# Patient Record
Sex: FEMALE | Race: WHITE | Employment: UNEMPLOYED | URBAN - METROPOLITAN AREA
[De-identification: names, ages, dates, MRNs, and addresses within clinical notes are randomized per-mention and may not be internally consistent; named-entity substitution may affect disease eponyms.]

---

## 2022-05-13 RX ORDER — IBUPROFEN 600 MG/1
600 TABLET, FILM COATED ORAL EVERY 6 HOURS PRN
Status: ON HOLD | COMMUNITY
End: 2022-05-27

## 2022-05-13 RX ORDER — LISINOPRIL AND HYDROCHLOROTHIAZIDE 12.5; 2 MG/1; MG/1
1 TABLET ORAL DAILY
Status: ON HOLD | COMMUNITY
End: 2022-06-09

## 2022-05-13 RX ORDER — ACETAMINOPHEN 500 MG
500-1000 TABLET ORAL EVERY 6 HOURS PRN
Status: ON HOLD | COMMUNITY
End: 2022-06-09

## 2022-05-13 RX ORDER — PANTOPRAZOLE SODIUM 40 MG/1
40 TABLET, DELAYED RELEASE ORAL DAILY
Status: ON HOLD | COMMUNITY
End: 2022-06-09

## 2022-05-13 RX ORDER — NAPROXEN SODIUM 220 MG
220 TABLET ORAL 2 TIMES DAILY WITH MEALS
Status: ON HOLD | COMMUNITY
End: 2022-06-09

## 2022-05-13 RX ORDER — METFORMIN HYDROCHLORIDE 750 MG/1
750 TABLET, EXTENDED RELEASE ORAL 2 TIMES DAILY WITH MEALS
Status: ON HOLD | COMMUNITY
End: 2022-06-09

## 2022-05-13 RX ORDER — GABAPENTIN 300 MG/1
300 CAPSULE ORAL 2 TIMES DAILY
Status: ON HOLD | COMMUNITY
End: 2022-06-09

## 2022-05-16 DIAGNOSIS — Z11.59 ENCOUNTER FOR SCREENING FOR OTHER VIRAL DISEASES: Primary | ICD-10-CM

## 2022-05-25 NOTE — PHARMACY-ADMISSION MEDICATION HISTORY
Medication history and patient interview completed by pharmacy intern/student or pre-admitting RN.  Reviewed by pharmacist, including [employer] dispense records, [facility] Everywhere, and chart review.       Joe Hammonds, Pharm.D., BCPS      Nurse Complete Set By: Vanita Mohr RN at 05/13/2022 10:13 AM       Prior to Admission medications    Medication Sig Last Dose Taking? Auth Provider   acetaminophen (TYLENOL) 500 MG tablet Take 500-1,000 mg by mouth every 6 hours as needed for mild pain  Yes Reported, Patient   gabapentin (NEURONTIN) 300 MG capsule Take 300 mg by mouth 2 times daily  Yes Reported, Patient   ibuprofen (ADVIL/MOTRIN) 600 MG tablet Take 600 mg by mouth every 6 hours as needed for moderate pain  Yes Reported, Patient   lisinopril-hydrochlorothiazide (ZESTORETIC) 20-12.5 MG tablet Take 1 tablet by mouth daily  Yes Reported, Patient   metFORMIN (GLUCOPHAGE-XR) 750 MG 24 hr tablet Take 750 mg by mouth 2 times daily (with meals)  Yes Reported, Patient   naproxen sodium (ANAPROX) 220 MG tablet Take 220 mg by mouth 2 times daily (with meals)  Yes Reported, Patient   pantoprazole (PROTONIX) 40 MG EC tablet Take 40 mg by mouth daily  Yes Reported, Patient

## 2022-05-27 ENCOUNTER — ANESTHESIA EVENT (OUTPATIENT)
Dept: SURGERY | Facility: CLINIC | Age: 37
DRG: 453 | End: 2022-05-27
Payer: COMMERCIAL

## 2022-05-27 ENCOUNTER — APPOINTMENT (OUTPATIENT)
Dept: GENERAL RADIOLOGY | Facility: CLINIC | Age: 37
DRG: 453 | End: 2022-05-27
Attending: NEUROLOGICAL SURGERY
Payer: COMMERCIAL

## 2022-05-27 ENCOUNTER — ANESTHESIA (OUTPATIENT)
Dept: SURGERY | Facility: CLINIC | Age: 37
DRG: 453 | End: 2022-05-27
Payer: COMMERCIAL

## 2022-05-27 ENCOUNTER — HOSPITAL ENCOUNTER (INPATIENT)
Facility: CLINIC | Age: 37
LOS: 5 days | Discharge: NURSING, REHAB OR LONG-TERM CARE | DRG: 453 | End: 2022-06-01
Attending: NEUROLOGICAL SURGERY | Admitting: NEUROLOGICAL SURGERY
Payer: COMMERCIAL

## 2022-05-27 DIAGNOSIS — N39.0 UTI DUE TO KLEBSIELLA SPECIES: ICD-10-CM

## 2022-05-27 DIAGNOSIS — B96.89 UTI DUE TO KLEBSIELLA SPECIES: ICD-10-CM

## 2022-05-27 DIAGNOSIS — M43.27 FUSION OF SPINE, LUMBOSACRAL REGION: Primary | ICD-10-CM

## 2022-05-27 LAB
CREAT SERPL-MCNC: 0.78 MG/DL (ref 0.52–1.04)
GFR SERPL CREATININE-BSD FRML MDRD: >90 ML/MIN/1.73M2
HGB BLD-MCNC: 11.3 G/DL (ref 11.7–15.7)
POTASSIUM BLD-SCNC: 4 MMOL/L (ref 3.4–5.3)

## 2022-05-27 PROCEDURE — 272N000282 HC OR IOM SUPPLIES OPNP: Performed by: NEUROLOGICAL SURGERY

## 2022-05-27 PROCEDURE — 250N000009 HC RX 250: Performed by: NURSE ANESTHETIST, CERTIFIED REGISTERED

## 2022-05-27 PROCEDURE — C1713 ANCHOR/SCREW BN/BN,TIS/BN: HCPCS | Performed by: NEUROLOGICAL SURGERY

## 2022-05-27 PROCEDURE — 258N000003 HC RX IP 258 OP 636: Performed by: NURSE ANESTHETIST, CERTIFIED REGISTERED

## 2022-05-27 PROCEDURE — 250N000013 HC RX MED GY IP 250 OP 250 PS 637: Performed by: PHYSICIAN ASSISTANT

## 2022-05-27 PROCEDURE — 370N000017 HC ANESTHESIA TECHNICAL FEE, PER MIN: Performed by: NEUROLOGICAL SURGERY

## 2022-05-27 PROCEDURE — 3E0R33Z INTRODUCTION OF ANTI-INFLAMMATORY INTO SPINAL CANAL, PERCUTANEOUS APPROACH: ICD-10-PCS | Performed by: NEUROLOGICAL SURGERY

## 2022-05-27 PROCEDURE — 07DS3ZZ EXTRACTION OF VERTEBRAL BONE MARROW, PERCUTANEOUS APPROACH: ICD-10-PCS | Performed by: NEUROLOGICAL SURGERY

## 2022-05-27 PROCEDURE — 272N000002 HC OR SUPPLY OTHER OPNP: Performed by: NEUROLOGICAL SURGERY

## 2022-05-27 PROCEDURE — 258N000003 HC RX IP 258 OP 636: Performed by: PHYSICIAN ASSISTANT

## 2022-05-27 PROCEDURE — 120N000001 HC R&B MED SURG/OB

## 2022-05-27 PROCEDURE — 0SG0371 FUSION OF LUMBAR VERTEBRAL JOINT WITH AUTOLOGOUS TISSUE SUBSTITUTE, POSTERIOR APPROACH, POSTERIOR COLUMN, PERCUTANEOUS APPROACH: ICD-10-PCS | Performed by: NEUROLOGICAL SURGERY

## 2022-05-27 PROCEDURE — 250N000011 HC RX IP 250 OP 636: Performed by: NURSE ANESTHETIST, CERTIFIED REGISTERED

## 2022-05-27 PROCEDURE — 250N000011 HC RX IP 250 OP 636: Performed by: NEUROLOGICAL SURGERY

## 2022-05-27 PROCEDURE — 0SG3371 FUSION OF LUMBOSACRAL JOINT WITH AUTOLOGOUS TISSUE SUBSTITUTE, POSTERIOR APPROACH, POSTERIOR COLUMN, PERCUTANEOUS APPROACH: ICD-10-PCS | Performed by: NEUROLOGICAL SURGERY

## 2022-05-27 PROCEDURE — 250N000009 HC RX 250: Performed by: NEUROLOGICAL SURGERY

## 2022-05-27 PROCEDURE — 360N000084 HC SURGERY LEVEL 4 W/ FLUORO, PER MIN: Performed by: NEUROLOGICAL SURGERY

## 2022-05-27 PROCEDURE — 4A11X4G MONITORING OF PERIPHERAL NERVOUS ELECTRICAL ACTIVITY, INTRAOPERATIVE, EXTERNAL APPROACH: ICD-10-PCS | Performed by: NEUROLOGICAL SURGERY

## 2022-05-27 PROCEDURE — 250N000013 HC RX MED GY IP 250 OP 250 PS 637: Performed by: NEUROLOGICAL SURGERY

## 2022-05-27 PROCEDURE — 0SG03A0 FUSION OF LUMBAR VERTEBRAL JOINT WITH INTERBODY FUSION DEVICE, ANTERIOR APPROACH, ANTERIOR COLUMN, PERCUTANEOUS APPROACH: ICD-10-PCS | Performed by: NEUROLOGICAL SURGERY

## 2022-05-27 PROCEDURE — 250N000011 HC RX IP 250 OP 636: Performed by: PHYSICIAN ASSISTANT

## 2022-05-27 PROCEDURE — 99223 1ST HOSP IP/OBS HIGH 75: CPT | Performed by: HOSPITALIST

## 2022-05-27 PROCEDURE — 01NR3ZZ RELEASE SACRAL NERVE, PERCUTANEOUS APPROACH: ICD-10-PCS | Performed by: NEUROLOGICAL SURGERY

## 2022-05-27 PROCEDURE — 82565 ASSAY OF CREATININE: CPT | Performed by: ANESTHESIOLOGY

## 2022-05-27 PROCEDURE — 3E013BZ INTRODUCTION OF ANESTHETIC AGENT INTO SUBCUTANEOUS TISSUE, PERCUTANEOUS APPROACH: ICD-10-PCS | Performed by: NEUROLOGICAL SURGERY

## 2022-05-27 PROCEDURE — 99222 1ST HOSP IP/OBS MODERATE 55: CPT | Performed by: NURSE PRACTITIONER

## 2022-05-27 PROCEDURE — 0SG33A0 FUSION OF LUMBOSACRAL JOINT WITH INTERBODY FUSION DEVICE, ANTERIOR APPROACH, ANTERIOR COLUMN, PERCUTANEOUS APPROACH: ICD-10-PCS | Performed by: NEUROLOGICAL SURGERY

## 2022-05-27 PROCEDURE — 0SH034Z INSERTION OF INTERNAL FIXATION DEVICE INTO LUMBAR VERTEBRAL JOINT, PERCUTANEOUS APPROACH: ICD-10-PCS | Performed by: NEUROLOGICAL SURGERY

## 2022-05-27 PROCEDURE — 0SH334Z INSERTION OF INTERNAL FIXATION DEVICE INTO LUMBOSACRAL JOINT, PERCUTANEOUS APPROACH: ICD-10-PCS | Performed by: NEUROLOGICAL SURGERY

## 2022-05-27 PROCEDURE — 85018 HEMOGLOBIN: CPT | Performed by: ANESTHESIOLOGY

## 2022-05-27 PROCEDURE — 710N000009 HC RECOVERY PHASE 1, LEVEL 1, PER MIN: Performed by: NEUROLOGICAL SURGERY

## 2022-05-27 PROCEDURE — 272N000001 HC OR GENERAL SUPPLY STERILE: Performed by: NEUROLOGICAL SURGERY

## 2022-05-27 PROCEDURE — 250N000011 HC RX IP 250 OP 636: Performed by: ANESTHESIOLOGY

## 2022-05-27 PROCEDURE — 01NB3ZZ RELEASE LUMBAR NERVE, PERCUTANEOUS APPROACH: ICD-10-PCS | Performed by: NEUROLOGICAL SURGERY

## 2022-05-27 PROCEDURE — 999N000179 XR SURGERY CARM FLUORO LESS THAN 5 MIN W STILLS: Mod: TC

## 2022-05-27 PROCEDURE — 250N000013 HC RX MED GY IP 250 OP 250 PS 637: Performed by: NURSE PRACTITIONER

## 2022-05-27 PROCEDURE — 36415 COLL VENOUS BLD VENIPUNCTURE: CPT | Performed by: ANESTHESIOLOGY

## 2022-05-27 PROCEDURE — 999N000141 HC STATISTIC PRE-PROCEDURE NURSING ASSESSMENT: Performed by: NEUROLOGICAL SURGERY

## 2022-05-27 PROCEDURE — 84132 ASSAY OF SERUM POTASSIUM: CPT | Performed by: ANESTHESIOLOGY

## 2022-05-27 RX ORDER — PROCHLORPERAZINE MALEATE 10 MG
10 TABLET ORAL EVERY 6 HOURS PRN
Status: DISCONTINUED | OUTPATIENT
Start: 2022-05-27 | End: 2022-06-01 | Stop reason: HOSPADM

## 2022-05-27 RX ORDER — DEXAMETHASONE SODIUM PHOSPHATE 4 MG/ML
INJECTION, SOLUTION INTRA-ARTICULAR; INTRALESIONAL; INTRAMUSCULAR; INTRAVENOUS; SOFT TISSUE PRN
Status: DISCONTINUED | OUTPATIENT
Start: 2022-05-27 | End: 2022-05-27

## 2022-05-27 RX ORDER — OXYCODONE HYDROCHLORIDE 5 MG/1
5 TABLET ORAL EVERY 4 HOURS PRN
Status: DISCONTINUED | OUTPATIENT
Start: 2022-05-27 | End: 2022-05-27 | Stop reason: HOSPADM

## 2022-05-27 RX ORDER — HYDROXYZINE HYDROCHLORIDE 25 MG/1
25 TABLET, FILM COATED ORAL EVERY 6 HOURS PRN
Status: DISCONTINUED | OUTPATIENT
Start: 2022-05-27 | End: 2022-05-28

## 2022-05-27 RX ORDER — LABETALOL 20 MG/4 ML (5 MG/ML) INTRAVENOUS SYRINGE
10 EVERY 10 MIN PRN
Status: DISCONTINUED | OUTPATIENT
Start: 2022-05-27 | End: 2022-05-27 | Stop reason: HOSPADM

## 2022-05-27 RX ORDER — ONDANSETRON 4 MG/1
4 TABLET, ORALLY DISINTEGRATING ORAL EVERY 6 HOURS PRN
Status: DISCONTINUED | OUTPATIENT
Start: 2022-05-27 | End: 2022-06-01 | Stop reason: HOSPADM

## 2022-05-27 RX ORDER — METHOCARBAMOL 500 MG/1
1000 TABLET, FILM COATED ORAL EVERY 6 HOURS
Status: DISCONTINUED | OUTPATIENT
Start: 2022-05-27 | End: 2022-06-01 | Stop reason: HOSPADM

## 2022-05-27 RX ORDER — OXYCODONE HYDROCHLORIDE 5 MG/1
5 TABLET ORAL EVERY 4 HOURS PRN
Status: DISCONTINUED | OUTPATIENT
Start: 2022-05-27 | End: 2022-05-27

## 2022-05-27 RX ORDER — NALOXONE HYDROCHLORIDE 0.4 MG/ML
0.4 INJECTION, SOLUTION INTRAMUSCULAR; INTRAVENOUS; SUBCUTANEOUS
Status: DISCONTINUED | OUTPATIENT
Start: 2022-05-27 | End: 2022-06-01 | Stop reason: HOSPADM

## 2022-05-27 RX ORDER — KETOROLAC TROMETHAMINE 30 MG/ML
15 INJECTION, SOLUTION INTRAMUSCULAR; INTRAVENOUS
Status: DISCONTINUED | OUTPATIENT
Start: 2022-05-27 | End: 2022-05-27 | Stop reason: HOSPADM

## 2022-05-27 RX ORDER — NALOXONE HYDROCHLORIDE 0.4 MG/ML
0.2 INJECTION, SOLUTION INTRAMUSCULAR; INTRAVENOUS; SUBCUTANEOUS
Status: DISCONTINUED | OUTPATIENT
Start: 2022-05-27 | End: 2022-06-01 | Stop reason: HOSPADM

## 2022-05-27 RX ORDER — METHOCARBAMOL 750 MG/1
750 TABLET, FILM COATED ORAL EVERY 6 HOURS
Status: DISCONTINUED | OUTPATIENT
Start: 2022-05-27 | End: 2022-05-27

## 2022-05-27 RX ORDER — PANTOPRAZOLE SODIUM 40 MG/1
40 TABLET, DELAYED RELEASE ORAL DAILY
Status: DISCONTINUED | OUTPATIENT
Start: 2022-05-27 | End: 2022-06-01 | Stop reason: HOSPADM

## 2022-05-27 RX ORDER — HYDROXYZINE HYDROCHLORIDE 25 MG/1
25 TABLET, FILM COATED ORAL 3 TIMES DAILY
Qty: 30 TABLET | Refills: 0 | Status: ON HOLD | OUTPATIENT
Start: 2022-05-27 | End: 2022-06-09

## 2022-05-27 RX ORDER — METFORMIN HYDROCHLORIDE 750 MG/1
750 TABLET, EXTENDED RELEASE ORAL 2 TIMES DAILY WITH MEALS
Status: DISCONTINUED | OUTPATIENT
Start: 2022-05-27 | End: 2022-06-01 | Stop reason: HOSPADM

## 2022-05-27 RX ORDER — SODIUM CHLORIDE, SODIUM LACTATE, POTASSIUM CHLORIDE, CALCIUM CHLORIDE 600; 310; 30; 20 MG/100ML; MG/100ML; MG/100ML; MG/100ML
INJECTION, SOLUTION INTRAVENOUS CONTINUOUS
Status: DISCONTINUED | OUTPATIENT
Start: 2022-05-27 | End: 2022-05-27 | Stop reason: HOSPADM

## 2022-05-27 RX ORDER — GLYCOPYRROLATE 0.2 MG/ML
INJECTION, SOLUTION INTRAMUSCULAR; INTRAVENOUS PRN
Status: DISCONTINUED | OUTPATIENT
Start: 2022-05-27 | End: 2022-05-27

## 2022-05-27 RX ORDER — FENTANYL CITRATE 50 UG/ML
25 INJECTION, SOLUTION INTRAMUSCULAR; INTRAVENOUS EVERY 5 MIN PRN
Status: DISCONTINUED | OUTPATIENT
Start: 2022-05-27 | End: 2022-05-27 | Stop reason: HOSPADM

## 2022-05-27 RX ORDER — AMOXICILLIN 250 MG
1 CAPSULE ORAL 2 TIMES DAILY
Status: DISCONTINUED | OUTPATIENT
Start: 2022-05-27 | End: 2022-06-01 | Stop reason: HOSPADM

## 2022-05-27 RX ORDER — MEPERIDINE HYDROCHLORIDE 25 MG/ML
25 INJECTION INTRAMUSCULAR; INTRAVENOUS; SUBCUTANEOUS
Status: DISCONTINUED | OUTPATIENT
Start: 2022-05-27 | End: 2022-05-27 | Stop reason: HOSPADM

## 2022-05-27 RX ORDER — METHOCARBAMOL 750 MG/1
750 TABLET, FILM COATED ORAL EVERY 6 HOURS PRN
Status: DISCONTINUED | OUTPATIENT
Start: 2022-05-27 | End: 2022-05-27

## 2022-05-27 RX ORDER — OXYCODONE HYDROCHLORIDE 10 MG/1
10 TABLET ORAL EVERY 4 HOURS PRN
Qty: 42 TABLET | Refills: 0 | Status: ON HOLD | OUTPATIENT
Start: 2022-05-27 | End: 2022-06-09

## 2022-05-27 RX ORDER — HYDROMORPHONE HCL IN WATER/PF 6 MG/30 ML
0.4 PATIENT CONTROLLED ANALGESIA SYRINGE INTRAVENOUS
Status: DISCONTINUED | OUTPATIENT
Start: 2022-05-27 | End: 2022-05-29

## 2022-05-27 RX ORDER — LISINOPRIL AND HYDROCHLOROTHIAZIDE 12.5; 2 MG/1; MG/1
1 TABLET ORAL DAILY
Status: DISCONTINUED | OUTPATIENT
Start: 2022-05-27 | End: 2022-06-01 | Stop reason: HOSPADM

## 2022-05-27 RX ORDER — ACETAMINOPHEN 325 MG/1
975 TABLET ORAL EVERY 8 HOURS
Status: COMPLETED | OUTPATIENT
Start: 2022-05-27 | End: 2022-05-30

## 2022-05-27 RX ORDER — KETAMINE HYDROCHLORIDE 10 MG/ML
INJECTION INTRAMUSCULAR; INTRAVENOUS PRN
Status: DISCONTINUED | OUTPATIENT
Start: 2022-05-27 | End: 2022-05-27

## 2022-05-27 RX ORDER — SODIUM CHLORIDE 9 MG/ML
INJECTION, SOLUTION INTRAVENOUS CONTINUOUS
Status: DISCONTINUED | OUTPATIENT
Start: 2022-05-27 | End: 2022-05-30

## 2022-05-27 RX ORDER — ONDANSETRON 2 MG/ML
INJECTION INTRAMUSCULAR; INTRAVENOUS PRN
Status: DISCONTINUED | OUTPATIENT
Start: 2022-05-27 | End: 2022-05-27

## 2022-05-27 RX ORDER — OXYCODONE HYDROCHLORIDE 5 MG/1
10 TABLET ORAL
Status: DISCONTINUED | OUTPATIENT
Start: 2022-05-27 | End: 2022-05-29

## 2022-05-27 RX ORDER — BISACODYL 10 MG
10 SUPPOSITORY, RECTAL RECTAL DAILY PRN
Status: DISCONTINUED | OUTPATIENT
Start: 2022-05-27 | End: 2022-06-01 | Stop reason: HOSPADM

## 2022-05-27 RX ORDER — FENTANYL CITRATE 50 UG/ML
INJECTION, SOLUTION INTRAMUSCULAR; INTRAVENOUS PRN
Status: DISCONTINUED | OUTPATIENT
Start: 2022-05-27 | End: 2022-05-27

## 2022-05-27 RX ORDER — BUPIVACAINE HYDROCHLORIDE 7.5 MG/ML
INJECTION, SOLUTION EPIDURAL; RETROBULBAR PRN
Status: DISCONTINUED | OUTPATIENT
Start: 2022-05-27 | End: 2022-05-27 | Stop reason: HOSPADM

## 2022-05-27 RX ORDER — SODIUM CHLORIDE, SODIUM LACTATE, POTASSIUM CHLORIDE, CALCIUM CHLORIDE 600; 310; 30; 20 MG/100ML; MG/100ML; MG/100ML; MG/100ML
INJECTION, SOLUTION INTRAVENOUS CONTINUOUS PRN
Status: DISCONTINUED | OUTPATIENT
Start: 2022-05-27 | End: 2022-05-27

## 2022-05-27 RX ORDER — CEFAZOLIN SODIUM/WATER 3 G/30 ML
3 SYRINGE (ML) INTRAVENOUS
Status: COMPLETED | OUTPATIENT
Start: 2022-05-27 | End: 2022-05-27

## 2022-05-27 RX ORDER — ONDANSETRON 4 MG/1
4 TABLET, ORALLY DISINTEGRATING ORAL EVERY 30 MIN PRN
Status: DISCONTINUED | OUTPATIENT
Start: 2022-05-27 | End: 2022-05-27 | Stop reason: HOSPADM

## 2022-05-27 RX ORDER — GABAPENTIN 100 MG/1
100 CAPSULE ORAL 3 TIMES DAILY
Status: DISCONTINUED | OUTPATIENT
Start: 2022-05-27 | End: 2022-05-27

## 2022-05-27 RX ORDER — OXYCODONE HYDROCHLORIDE 5 MG/1
5 TABLET ORAL
Status: DISCONTINUED | OUTPATIENT
Start: 2022-05-27 | End: 2022-05-29

## 2022-05-27 RX ORDER — LIDOCAINE 40 MG/G
CREAM TOPICAL
Status: DISCONTINUED | OUTPATIENT
Start: 2022-05-27 | End: 2022-06-01 | Stop reason: HOSPADM

## 2022-05-27 RX ORDER — LIDOCAINE HYDROCHLORIDE 10 MG/ML
INJECTION, SOLUTION INFILTRATION; PERINEURAL PRN
Status: DISCONTINUED | OUTPATIENT
Start: 2022-05-27 | End: 2022-05-27

## 2022-05-27 RX ORDER — FENTANYL CITRATE 50 UG/ML
25 INJECTION, SOLUTION INTRAMUSCULAR; INTRAVENOUS
Status: DISCONTINUED | OUTPATIENT
Start: 2022-05-27 | End: 2022-05-27 | Stop reason: HOSPADM

## 2022-05-27 RX ORDER — ACETAMINOPHEN 325 MG/1
650 TABLET ORAL EVERY 4 HOURS PRN
Status: DISCONTINUED | OUTPATIENT
Start: 2022-05-30 | End: 2022-06-01 | Stop reason: HOSPADM

## 2022-05-27 RX ORDER — METHOCARBAMOL 750 MG/1
750 TABLET, FILM COATED ORAL EVERY 8 HOURS
Qty: 30 TABLET | Refills: 0 | Status: ON HOLD | OUTPATIENT
Start: 2022-05-27 | End: 2022-06-09

## 2022-05-27 RX ORDER — OXYCODONE HYDROCHLORIDE 5 MG/1
10 TABLET ORAL EVERY 4 HOURS PRN
Status: DISCONTINUED | OUTPATIENT
Start: 2022-05-27 | End: 2022-05-27

## 2022-05-27 RX ORDER — ONDANSETRON 2 MG/ML
4 INJECTION INTRAMUSCULAR; INTRAVENOUS EVERY 6 HOURS PRN
Status: DISCONTINUED | OUTPATIENT
Start: 2022-05-27 | End: 2022-06-01 | Stop reason: HOSPADM

## 2022-05-27 RX ORDER — PROPOFOL 10 MG/ML
INJECTION, EMULSION INTRAVENOUS CONTINUOUS PRN
Status: DISCONTINUED | OUTPATIENT
Start: 2022-05-27 | End: 2022-05-27

## 2022-05-27 RX ORDER — ONDANSETRON 2 MG/ML
4 INJECTION INTRAMUSCULAR; INTRAVENOUS EVERY 30 MIN PRN
Status: DISCONTINUED | OUTPATIENT
Start: 2022-05-27 | End: 2022-05-27 | Stop reason: HOSPADM

## 2022-05-27 RX ORDER — GABAPENTIN 300 MG/1
300 CAPSULE ORAL 3 TIMES DAILY
Status: DISCONTINUED | OUTPATIENT
Start: 2022-05-27 | End: 2022-05-28

## 2022-05-27 RX ORDER — GABAPENTIN 100 MG/1
300 CAPSULE ORAL
Status: COMPLETED | OUTPATIENT
Start: 2022-05-27 | End: 2022-05-27

## 2022-05-27 RX ORDER — PROPOFOL 10 MG/ML
INJECTION, EMULSION INTRAVENOUS PRN
Status: DISCONTINUED | OUTPATIENT
Start: 2022-05-27 | End: 2022-05-27

## 2022-05-27 RX ORDER — CEFAZOLIN SODIUM/WATER 3 G/30 ML
3 SYRINGE (ML) INTRAVENOUS SEE ADMIN INSTRUCTIONS
Status: DISCONTINUED | OUTPATIENT
Start: 2022-05-27 | End: 2022-05-27 | Stop reason: HOSPADM

## 2022-05-27 RX ORDER — POLYETHYLENE GLYCOL 3350 17 G/17G
17 POWDER, FOR SOLUTION ORAL DAILY
Status: DISCONTINUED | OUTPATIENT
Start: 2022-05-28 | End: 2022-06-01 | Stop reason: HOSPADM

## 2022-05-27 RX ORDER — KETOROLAC TROMETHAMINE 30 MG/ML
15 INJECTION, SOLUTION INTRAMUSCULAR; INTRAVENOUS EVERY 6 HOURS
Status: COMPLETED | OUTPATIENT
Start: 2022-05-27 | End: 2022-05-28

## 2022-05-27 RX ORDER — LIDOCAINE 40 MG/G
CREAM TOPICAL
Status: DISCONTINUED | OUTPATIENT
Start: 2022-05-27 | End: 2022-05-27 | Stop reason: HOSPADM

## 2022-05-27 RX ORDER — HYDROMORPHONE HCL IN WATER/PF 6 MG/30 ML
0.2 PATIENT CONTROLLED ANALGESIA SYRINGE INTRAVENOUS
Status: DISCONTINUED | OUTPATIENT
Start: 2022-05-27 | End: 2022-05-29

## 2022-05-27 RX ADMIN — HYDROMORPHONE HYDROCHLORIDE 1 MG: 1 INJECTION, SOLUTION INTRAMUSCULAR; INTRAVENOUS; SUBCUTANEOUS at 08:13

## 2022-05-27 RX ADMIN — SENNOSIDES AND DOCUSATE SODIUM 1 TABLET: 50; 8.6 TABLET ORAL at 20:24

## 2022-05-27 RX ADMIN — Medication 100 MG: at 07:37

## 2022-05-27 RX ADMIN — HYDROMORPHONE HYDROCHLORIDE 0.2 MG: 0.2 INJECTION, SOLUTION INTRAMUSCULAR; INTRAVENOUS; SUBCUTANEOUS at 12:26

## 2022-05-27 RX ADMIN — HYDROMORPHONE HYDROCHLORIDE 0.4 MG: 1 INJECTION, SOLUTION INTRAMUSCULAR; INTRAVENOUS; SUBCUTANEOUS at 10:24

## 2022-05-27 RX ADMIN — ONDANSETRON HYDROCHLORIDE 4 MG: 2 INJECTION, SOLUTION INTRAVENOUS at 08:49

## 2022-05-27 RX ADMIN — OXYCODONE HYDROCHLORIDE 10 MG: 5 TABLET ORAL at 11:06

## 2022-05-27 RX ADMIN — HYDROMORPHONE HYDROCHLORIDE 0.2 MG: 0.2 INJECTION, SOLUTION INTRAMUSCULAR; INTRAVENOUS; SUBCUTANEOUS at 15:50

## 2022-05-27 RX ADMIN — MIDAZOLAM 2 MG: 1 INJECTION INTRAMUSCULAR; INTRAVENOUS at 07:31

## 2022-05-27 RX ADMIN — FENTANYL CITRATE 25 MCG: 50 INJECTION INTRAMUSCULAR; INTRAVENOUS at 10:17

## 2022-05-27 RX ADMIN — PHENYLEPHRINE HYDROCHLORIDE 150 MCG: 10 INJECTION INTRAVENOUS at 08:52

## 2022-05-27 RX ADMIN — SODIUM CHLORIDE, POTASSIUM CHLORIDE, SODIUM LACTATE AND CALCIUM CHLORIDE: 600; 310; 30; 20 INJECTION, SOLUTION INTRAVENOUS at 06:57

## 2022-05-27 RX ADMIN — KETOROLAC TROMETHAMINE 15 MG: 30 INJECTION, SOLUTION INTRAMUSCULAR at 20:25

## 2022-05-27 RX ADMIN — FENTANYL CITRATE 150 MCG: 50 INJECTION, SOLUTION INTRAMUSCULAR; INTRAVENOUS at 07:36

## 2022-05-27 RX ADMIN — HYDROMORPHONE HYDROCHLORIDE 0.4 MG: 0.2 INJECTION, SOLUTION INTRAMUSCULAR; INTRAVENOUS; SUBCUTANEOUS at 22:06

## 2022-05-27 RX ADMIN — HYDROMORPHONE HYDROCHLORIDE 0.4 MG: 1 INJECTION, SOLUTION INTRAMUSCULAR; INTRAVENOUS; SUBCUTANEOUS at 10:46

## 2022-05-27 RX ADMIN — PANTOPRAZOLE SODIUM 40 MG: 40 TABLET, DELAYED RELEASE ORAL at 13:45

## 2022-05-27 RX ADMIN — GABAPENTIN 300 MG: 100 CAPSULE ORAL at 13:46

## 2022-05-27 RX ADMIN — METHOCARBAMOL 750 MG: 750 TABLET ORAL at 13:59

## 2022-05-27 RX ADMIN — PROPOFOL 175 MCG/KG/MIN: 10 INJECTION, EMULSION INTRAVENOUS at 07:59

## 2022-05-27 RX ADMIN — HYDROXYZINE HYDROCHLORIDE 25 MG: 25 TABLET, FILM COATED ORAL at 17:09

## 2022-05-27 RX ADMIN — LISINOPRIL AND HYDROCHLOROTHIAZIDE 1 TABLET: 12.5; 2 TABLET ORAL at 13:45

## 2022-05-27 RX ADMIN — OXYCODONE HYDROCHLORIDE 10 MG: 5 TABLET ORAL at 16:20

## 2022-05-27 RX ADMIN — OXYCODONE HYDROCHLORIDE 10 MG: 5 TABLET ORAL at 19:23

## 2022-05-27 RX ADMIN — HYDROMORPHONE HYDROCHLORIDE 0.4 MG: 0.2 INJECTION, SOLUTION INTRAMUSCULAR; INTRAVENOUS; SUBCUTANEOUS at 17:58

## 2022-05-27 RX ADMIN — GABAPENTIN 300 MG: 100 CAPSULE ORAL at 20:24

## 2022-05-27 RX ADMIN — METHOCARBAMOL 1000 MG: 500 TABLET ORAL at 20:24

## 2022-05-27 RX ADMIN — ROCURONIUM BROMIDE 10 MG: 50 INJECTION, SOLUTION INTRAVENOUS at 07:36

## 2022-05-27 RX ADMIN — KETOROLAC TROMETHAMINE 15 MG: 30 INJECTION, SOLUTION INTRAMUSCULAR at 13:46

## 2022-05-27 RX ADMIN — PROPOFOL 150 MG: 10 INJECTION, EMULSION INTRAVENOUS at 07:45

## 2022-05-27 RX ADMIN — ACETAMINOPHEN 975 MG: 325 TABLET, FILM COATED ORAL at 22:05

## 2022-05-27 RX ADMIN — ACETAMINOPHEN 975 MG: 325 TABLET, FILM COATED ORAL at 13:44

## 2022-05-27 RX ADMIN — DEXAMETHASONE SODIUM PHOSPHATE 8 MG: 4 INJECTION, SOLUTION INTRA-ARTICULAR; INTRALESIONAL; INTRAMUSCULAR; INTRAVENOUS; SOFT TISSUE at 07:37

## 2022-05-27 RX ADMIN — FENTANYL CITRATE 100 MCG: 50 INJECTION, SOLUTION INTRAMUSCULAR; INTRAVENOUS at 07:44

## 2022-05-27 RX ADMIN — SODIUM CHLORIDE: 9 INJECTION, SOLUTION INTRAVENOUS at 12:31

## 2022-05-27 RX ADMIN — LIDOCAINE HYDROCHLORIDE 50 MG: 10 INJECTION, SOLUTION INFILTRATION; PERINEURAL at 07:36

## 2022-05-27 RX ADMIN — Medication 50 MG: at 08:00

## 2022-05-27 RX ADMIN — CEFAZOLIN SODIUM 3 G: 10 INJECTION, POWDER, FOR SOLUTION INTRAVENOUS at 07:35

## 2022-05-27 RX ADMIN — HYDROXYZINE HYDROCHLORIDE 25 MG: 25 TABLET, FILM COATED ORAL at 10:58

## 2022-05-27 RX ADMIN — SODIUM CHLORIDE, POTASSIUM CHLORIDE, SODIUM LACTATE AND CALCIUM CHLORIDE: 600; 310; 30; 20 INJECTION, SOLUTION INTRAVENOUS at 08:28

## 2022-05-27 RX ADMIN — FENTANYL CITRATE 25 MCG: 50 INJECTION INTRAMUSCULAR; INTRAVENOUS at 10:10

## 2022-05-27 RX ADMIN — GABAPENTIN 300 MG: 100 CAPSULE ORAL at 06:13

## 2022-05-27 RX ADMIN — GLYCOPYRROLATE 0.2 MG: 0.2 INJECTION, SOLUTION INTRAMUSCULAR; INTRAVENOUS at 07:36

## 2022-05-27 RX ADMIN — PROPOFOL 200 MG: 10 INJECTION, EMULSION INTRAVENOUS at 07:36

## 2022-05-27 ASSESSMENT — ACTIVITIES OF DAILY LIVING (ADL)
ADLS_ACUITY_SCORE: 20

## 2022-05-27 NOTE — CONSULTS
[facility]  Pain Service Consultation   Text Page    Date of Admission:  5/27/2022  ADDENDUM:  Returned to the bedside for uncontrolled pain.  Modified oxycodone dosing to every 3 hours, increased robaxin dose to 1000 mg.  Assessment & Plan   Tere Turk is a 36 year old female who was admitted on 5/27/2022. I was asked by Bea Mayfield to see the patient for post operative pain management.    PLAN:   1)  Opioids:  - Oxycodone 5-10 mg every 4 hours prn moderate to severe pain  - hydromorphone 0.2 - 0.4 mg every 2 hours severe, breakthrough pain    Opioids Treatment Goal:   -Improvement in function  -Participate in PT  -Post-operative management of pain, expected 3-7 days needed    2)Non-opioid multimodal medication therapy  -Topical:Voltaren 1% Topical Gel four times daily, Lidocaine Patch 4% place daily in evening.  -N-SAIDS:Ketorolac - per surgical team  -Muscle Relaxants:Methocarbamol 750 mg every 6 hours scheduled  -Adjuvants:Acetaminophen 975 mg every 8 hours, Gabapentin 300 mg three times daily, Hydroxyzine 25-50 mg every 8 hours prn  -Antidepresants/anxiolytics:Hydroxyzine 25-50 mg every 8 hours prn    3)  Non-medication interventions  Positioning, ICE, Relaxation, Physical therapy    4)  Constipation Prophylaxis  - senna-docusate 1 tablet 2 times daily  - miralax 1 packet daily    5) Medication Risk reduction strategies   -monitor for sedation   -Capnography per protocol   -narcan for opioid reversal     6)  Pain Education  -Opioid safe use, storage and disposal information included in DC AVS    7)  DC Planning   Discussed goal of Opioid therapy as above with patient and nursing team  Length of therapy is less than 10 days, opioids may be stopped without taper.  Continued outpatient management of pain per surgical team  Disposition: home  Support systems: mother  The following risk factors have been identified for unintentional overdose: patient is overweight and patient has  "sleep disordered breathing. Discharge with intra-nasal naloxone if discharged to home with opioids  >40 mg MME/day.  Plan for education prior to discharge.    ASSESSMENT  1)  Acute low back pain s/p L4 - S1 lumbar decompression and fusion    2)  Patient with chronic back and joint pain, NOT on chronic opioid therapy  Baseline 0 mg Daily Morphine Equivalent as dispensed and as reported daily use.  Patient has no expected opioid tolerance.     Patient's opioid use in perioperative settin.0 mg dilaudid IV, 300 mcg fentanyl IV, 10 mg oxycodone PO= 75 mg Daily Morphine Equivalent    3)  Risk factors for opioid related harms  -Sleep disordered breathing    4)  Opioid induced side-effects:  -Constipation  - history of constipation, monitor for need to escalate regimen  -Nausea/Vomit - no history  -Sedation - history of \"dizzy and sleepy\" feelings with opiates.   -Urinary Retention - no history    Time Spent on this Encounter   Total unit/floor time 45 minutes, time consisted of the following, examination of the patient, reviewing the record and completing documentation. >50% of time spent in counseling and coordination of care.  Time spend counseling with patient and family consisted of the following topics, symptom management.  Time spent in coordination of care with Bedside Nurse Roselia.     TATUM Crenshaw CNP  Pain Management and Palliative Care  [facility]  Pgr: [phone]      Reason for Consult   Reason for consult: I was asked by Bea Mayfield to evaluate this patient for post operative pain management.    Primary Care Physician   Primary Care Physician:Provider Not In System - patient resides and has PCP in [place]    Chief Complaint   S/p L4 - S1 fusion    History is obtained from the patient and electronic health record    History of Present Illness   Tere Turk is a 36 year old female with history of low back and joint pain, PCOS, GERD, DEREK, obesity who presents for " "elective lumbar fusion surgery with Dr. Mcgraw.      CURRENT PAIN:  Her pain is located in the low back, hips  It is described as Aching, burning, \"gripping\"  She rates it as ranging between 9/10 and 10/10  The average is 9/10 on a scale of 0-10  Currently it is rated as 9/10  It improves by positioning with knees elevated, medications  It worsens by movement, knees flat on the bed  She has been compliant with the recommendations while in the hospital.      PAIN HISTORY:  The pain is mainly located in the low back  It is described as Aching and Shooting  Rates it as ranging between 4/10 and 10/10  It improves by medications, sitting  It worsens by lying flat, standing/walking for prolonged periods of time      PAST PAIN TREATMENT:   Medications: gabapentin 300 mg two times daily, ibuprofen/aleve (primary agents used, helps with day to day pain), robaxin (one short course, helped), oxycodone (remote history, helped),   Non-phamacologic modalities: physical therapy  Previous interventions/surgeries: CARMEN.    [place] Board of Pharmacy Data Base Reviewed:    YES; As expected, no concern for misuse/abuse of controlled medications based on this report.  OPIOID RISK DOHYZ=528    Past Medical History   I have reviewed this patient's medical history and updated it with pertinent information if needed.   Past Medical History:   Diagnosis Date     Gastroesophageal reflux disease      Hypertension      PCOS (polycystic ovarian syndrome)      Sleep apnea     bipap     WPW (Portia-Parkinson-White syndrome)     ablation        Past Surgical History   I have reviewed this patient's surgical history and updated it with pertinent information if needed.  Past Surgical History:   Procedure Laterality Date     CARDIAC SURGERY      ablation - WPW      SECTION  2017     COLONOSCOPY       EPIDURAL BLOCK INJECTION      injections with sedation x2 lower back     GYN SURGERY      polypectomy x2 2016 and 2015     HC " HYSTEROSALPINGOGRAM      x2     ORTHOPEDIC SURGERY Right     knee arthroscopy     TUMOR EXCISION      from abdomen area, desmoid tumor, required surgery x3     WOUND DEBRIDEMENT      wound vac after  section incision (hematoma)         Prior to Admission Medications   Prior to Admission Medications   Prescriptions Last Dose Informant Patient Reported? Taking?   acetaminophen (TYLENOL) 500 MG tablet 2022 at 1600  Yes Yes   Sig: Take 500-1,000 mg by mouth every 6 hours as needed for mild pain   gabapentin (NEURONTIN) 300 MG capsule 2022 at 0900  Yes Yes   Sig: Take 300 mg by mouth 2 times daily   ibuprofen (ADVIL/MOTRIN) 600 MG tablet 2022  Yes No   Sig: Take 600 mg by mouth every 6 hours as needed for moderate pain   lisinopril-hydrochlorothiazide (ZESTORETIC) 20-12.5 MG tablet 2022 at 1600  Yes Yes   Sig: Take 1 tablet by mouth daily   metFORMIN (GLUCOPHAGE-XR) 750 MG 24 hr tablet 2022 at 1600  Yes Yes   Sig: Take 750 mg by mouth 2 times daily (with meals)   naproxen sodium (ANAPROX) 220 MG tablet 2022  Yes Yes   Sig: Take 220 mg by mouth 2 times daily (with meals)   pantoprazole (PROTONIX) 40 MG EC tablet 2022 at 0900  Yes Yes   Sig: Take 40 mg by mouth daily      Facility-Administered Medications: None     Allergies   No Known Allergies    Social History   I have reviewed this patient's social history and updated it with pertinent information if needed. Tere Turk  reports that she has never smoked. She has never used smokeless tobacco. She reports previous alcohol use. She reports that she does not use drugs.    Family History   I have reviewed this patient's family history and updated it with pertinent information if needed.   History reviewed. No pertinent family history.  Family history of addiction - no    Review of Systems   The 10 point Review of Systems is negative other than noted in the HPI or here.    Denies Bowel or bladder dysfunction    Physical Exam    Temp:  [98  F (36.7  C)-98.4  F (36.9  C)] 98.2  F (36.8  C)  Pulse:  [] 108  Resp:  [12-25] 16  BP: (104-160)/() 160/82  SpO2:  [91 %-100 %] 91 %  316 lbs 3.2 oz  GEN:  Alert, oriented x 3, lying in bed, No apparent distress.  HEENT:  Normocephalic/atraumatic, no scleral icterus, no nasal discharge, mouth moist.  CV:  RRR, S1, S2; no murmurs or other irregularities noted.  +3 DP/PT pulses bilaterally; trace bilateral lower extremeties.  RESP:  Clear to auscultation bilaterally without rales/rhonchi/wheezing/retractions.  Symmetric chest rise on inhalation noted.  Normal respiratory effort.  ABD:  Rounded, soft, non-tender/non-distended.  +BS  EXT:  Edema & pulses as noted above.  Color, moisture and sensation intact x 4.     SKIN:  Dry to touch, no exanthems noted in the visualized areas.    NEURO: pain limited exam, moves extremities spontaneously against gravity.  NO focal deficits  PAIN BEHAVIOR: Cooperative  Psych:  Normal affect.  Calm, cooperative, conversant appropriately.     Data   Results for orders placed or performed during the hospital encounter of 05/27/22 (from the past 24 hour(s))   Hemoglobin   Result Value Ref Range    Hemoglobin 11.3 (L) 11.7 - 15.7 g/dL   Creatinine   Result Value Ref Range    Creatinine 0.78 0.52 - 1.04 mg/dL    GFR Estimate >90 >60 mL/min/1.73m2   Potassium   Result Value Ref Range    Potassium 4.0 3.4 - 5.3 mmol/L   XR Surgery SUZI L/T 5 Min Fluoro w Stills    Narrative    This exam was marked as non-reportable because it will not be read by a   radiologist or a [facility] non-radiologist provider.

## 2022-05-27 NOTE — OP NOTE
REPORT OF OPERATION  Tere Turk is a 36 year old old female admitted on 5/27/2022  5:48 AM.  Operative Date:  5/27/2022    PRE-PROCEDURE DIAGNOSIS:  1)L 4/5/S1  degenerative disc disease, radiculopathy, claudication,  stenosis,  Listhesis, Scoliosis  2)Body mass index is 52.62 kg/m . Morbid  Obesity  POST-PROCEDURE DIAGNOSIS:  1) Same as above  PROCEDURE PERFORMED:  1) L4/5/S1 oblique lateral lumbar interbody fusion with discectomy, preparation of the endplate and placement of a bullet cage packed with calcium triphosphate soaked in bone marrow anterior to the transverse process in modified prone position, with intraoperative biplanar fluoroscopic imaging and electrophysiological monitoring.  2) L4/5/S1  Posterior minimally invasive pedicle screw placement and posterolateral instrumentation and fusion with LNK  system with intraoperative biplanar fluoroscopic imaging and electrophysiological monitoring.  3) Epidural steroid injection.  4) Transpedicular Bone marrow aspiration  5) Injection of 0.75 marcaine  in paravertebral tissue for post op pain management  Surgeon: Carlitos Ling MD  ASSISTANT: Isabel Mayfield PA-C  This is complex surgery on the pelvic structure and an assistant is needed for safety of the surgery for set up of instrumentation, retraction and closing.  HISTORY: Please refer to my clinic note for full details, but in short, the patient is a 36  -year-old female with severe back pain and radiculopathy not responding to usual conservative therapy. Patient was set up for the surgery as mentioned above and was taken to surgery as mentioned above after all risks and benefits were explained.  PROCEDURE:  The patient was taken to surgery. After general anesthesia was applied, SCDs and Gramajo placed and preoperative antibiotic given, then the patient was positioned on the Dejon table and Alverto frame in a modified prone position for ease of access from the left side.  AP and lateral fluoroscopic images are  positioned. Patient has been prepped and draped in sterile fashion. The landmarks, including Spinal process, transverse process, disk space, endplates and pedicels are identified and marked.  A Jamshidi needle is placed in the upper right pedicle inside of the vertebral body and bone marrow has been aspirated to be mixed with biologics to introduce Stem cells to the biologics.  Following steps are then taken for levels:    L4/5  Cage size 12 mm high and 33 mm long Titanium  L5/S1   Cage size 13 mm high and 27 mm long Titanium  The patient was turned using the rotation of the surgical table so that a near direct anterior-lateral approach to the lumbar spine could be achieved. A small  incision was then made superior to the mid iliac crest and then using biplanar fluoroscopic visualization, under electrophysiological monitoring and stimulation, we introduced an electrophysiological probe through the retroperitoneal space into the desired discs anterior to the transverse processes and then passed it into the disc space after finding a silent window. The sleeve is retained and the probe is removed, then a K wire is passed sequentially into the disk space. A dilating tube was then passed along this same route. Following this, a working channel was then passed sequentially into the disc spaces. The working channel was manually held in position while a series of disc cleaning tools was passed through the channel to remove the affected discs under clear and direct biplanar fluoroscopic visualization, decompress the nerve roots, and decorticate the vertebral endplates at those segments. Physiologic decompression of the spinal canal, lateral recess and foramen are achieved by restoring the anatomical intradiscal space with increasing the interpedicular space by interbody graft.    Arthrodesis of the intervertebral spaces via an anterior retroperitoneal exposure and application of an intervertebral biomechanical device was  then accomplished by using the working channel that had been placed in the retroperitoneal space anterior to the transverse processes. After adequate decompression and preparation of the endplates, we then put calcium triphosphate soaked in bone marrow into the anterior disc space. The working channel was then removed.Then a PEEK interbody was packed tightly with allograft bone for stabilization and arthrodesis of the intervertebral spaces and inserted into the mid portion of the intervertebral discs over a K-Wire under biplanar fluoroscopic visualization and free run EMG s . All bones were confined to the borders of the disc space.     Following steps are then taken for levels:  L4 7.5mm Screw size Right 50 Left 50   L 5 7.5mm Screw size Right 55 Left 55   S1 7.5mm Screw size Right 45 Left 45   Then the patient is rotated for a true prone position. The entry point for the pedicle is identified in the AP and lateral view, and then skin incision is injected with local anesthetic. Then we entered the pedicle with a Jamshidi needle. Over the Jamshidi needle, we introduced the K wire in the Vertebral body.  Additionally we use a small periosteal decorticator along the screws to refresh the surface of the bone and facet and put some amounts of Calcium-triphosphate soaked in bone marrow for additional posterolateral fusion. Over the K wire then , we dilate the muscle with the dilator and then put the pedicle screws bilaterally. Screws are all silent up to 12 MA of stimulation. After screws are all placed, we put the connie in place and under fluoroscopic imaging, we lock the connie in place and remove the screw tops and then each incision has been closed with 2-0 Vicryl suture and then Steri-Strips applied.  Before the end of the surgery, we injected 40mg Kenalog and 1 cc 0.25% Marcaine for epidural steroid injection in epidural space under fluoroscopic imaging after we introduced the spinal needle and confirmed with injecting  2cc air and aspiration which confirms we are in the epidural space and no CSF is returned.  20 cc of marcaine 0.75 was injected into deep tissue at the end of surgery for postoperative pain management.  Before closing skin we inject  24 cc 0.75% marcaine in paravertebral tissue for post op pain management.     Additional finding: Body mass index is 52.62 kg/m .   Obesity and developmental abnormality of spine  made the surgery technically more challenging and so  extended the surgery time by 30 minutes additionally quality of imaging was poorer because of high BMI the left L4 screw has a lateral breach but felt solid in the bone and stimulation was negative up to 22 mA which assures no connection to neural structure( usual threshold is 8mA)  Considering patient BMI and diffculy of imaging and extented intubation risk with extreme BMI we decided to keep L4 screw with lateral breach in place which will add some mechanical stability to L4-S1 construct- additionally the main goal id L5/S1 stabilization and L4/5 is only added for additional stability  And after weighing risk vs benefits  Additional extended surgery was not jsutified.  Estimated blood: 55cc.    DISPOSITION: To PACU with postoperative antibiotics. All counts are correct at the end of the surgery.  Carlitos Ling MD    cc: Carlitos Ling MD    ---

## 2022-05-27 NOTE — ANESTHESIA POSTPROCEDURE EVALUATION
Patient: Tere Turk    Procedure: Procedure(s):  Lumbar 4-Sacral 1 Interbody and Posterolateral Fusion, Minimally Invasive Versus Open with Possible Bilateral Cages       Anesthesia Type:  General    Note:     Postop Pain Control: Uneventful            Sign Out: Well controlled pain   PONV: No   Neuro/Psych: Uneventful            Sign Out: Acceptable/Baseline neuro status   Airway/Respiratory: Uneventful            Sign Out: Acceptable/Baseline resp. status   CV/Hemodynamics: Uneventful            Sign Out: Acceptable CV status   Other NRE: NONE   DID A NON-ROUTINE EVENT OCCUR? No           Last vitals:  Vitals Value Taken Time   /77 05/27/22 1130   Temp 98.4  F (36.9  C) 05/27/22 1127   Pulse 103 05/27/22 1133   Resp 13 05/27/22 1133   SpO2 98 % 05/27/22 1132   Vitals shown include unvalidated device data.    Electronically Signed By: Vj Montalvo MD  May 27, 2022  11:35 AM

## 2022-05-27 NOTE — PLAN OF CARE
Goal Outcome Evaluation:      Patient arrived to the floor around 1230 from the OR. Alert and oriented x4. 2L of NC O2 in place.  VSS. Afebrile. C/o a lot of pain in her hips. Prn Dilaudid was given with minimal relieve, then her schedule  Tylenol, Toradol and gabapentin was give with relieve reported. Surgical site remain CDI. Tolerating the clears. Will continue monitor.

## 2022-05-27 NOTE — CONSULTS
[facility]    Hospitalist Consultation    Date of Admission:  5/27/2022  Date of Consult (When I saw the patient): 05/27/22    Provider: Guillaume Rubalcava MD    Assessment & Plan   Tere Tukr is a 36 year old female who was admitted on 5/27/2022. I was asked to see the patient for management of her clinically comorbidities after surgical intervention. She presents for an elective LS fusion after failing conservative treatment for her spinal DJD and chronic pain.  She underwent a multilevel spinal fusion involving L4/L5/S1 oblique lateral interbody fusion with discectomy.    1.  Chronic lower back pain and severe DJD.  Status post multilevel vertebral interbody fusion and discectomy.  POD #0.  -Pain control, postsurgical cares, rehabilitation and DVT Prophylaxis as outlined by primary team.  2.  Essential hypertension.  -I recommend to continue her home meds same dose.  3.  DEREK.  -Use her BiPAP machine here in the hospital with home settings.  May get help from RT if needed.  4.  GERD.  -Continue PPI as prior to admission.  5.  PCOS.  -Metformin as prior to admission.  6.  WPW syndrome.  -Successfully treated with ablation in 2014.       DVT Prophylaxis: Defer to primary service    Code Status: Full Code    Disposition: Expected discharge in 1-2 days per surgeon plan  Thank you so much for the opportunity of this consultation. I or one of my colleagues will follow along with you. Please contact me if you have any question regarding the plan of care.      Primary Care Physician   Provider Not In System    Chief Complaint   Low back pain    History is obtained from the patient and electronic health record    History of Present Illness   Tere Turk is a 36 year old female who has a history of severe obesity, PCOS, obstructive sleep apnea on BiPAP, GERD, essential hypertension, remote history of WPW with successful ablation performed in 2014.  She presents for an elective LS fusion after  failing conservative treatment for her spinal DJD and chronic pain.  She underwent a multilevel spinal fusion involving L4/L5/S1 oblique lateral interbody fusion with discectomy.  She did not have any complications perioperative, recovered in PACU and was transferred to the surgical cameron where I am seeing her.  At the moment of my visit the level of pain totally controlled, she is not having chest pain, shortness of breath, nausea or vomiting.  She is in a capnography monitor for surveillance, readings in the screen are consistent with desire parameters.  She has treatment for all her comorbidities and she reports being compliant.  She is a BiPAP machine at time for her DEREK, she is on metformin to help with her PCOS and she takes Zestoretic for blood pressure control.  She is on pantoprazole for GERD.  Her Portia-Parkinson-White syndrome was successfully treated with ablation in  as mentioned before  .    Past Medical History    I have reviewed this patient's medical history.   Past Medical History:   Diagnosis Date     Gastroesophageal reflux disease      Hypertension      PCOS (polycystic ovarian syndrome)      Sleep apnea     bipap     WPW (Portia-Parkinson-White syndrome)     ablation        Past Surgical History   I have reviewed this patient's surgical history and updated it with pertinent information if needed.  Past Surgical History:   Procedure Laterality Date     CARDIAC SURGERY      ablation - WPW      SECTION  2017     COLONOSCOPY       EPIDURAL BLOCK INJECTION      injections with sedation x2 lower back     GYN SURGERY      polypectomy x2  and 2015     HC HYSTEROSALPINGOGRAM      x2     ORTHOPEDIC SURGERY Right 2020    knee arthroscopy     TUMOR EXCISION      from abdomen area, desmoid tumor, required surgery x3     WOUND DEBRIDEMENT      wound vac after  section incision (hematoma)      Prior to Admission Medications   Prior to Admission Medications   Prescriptions Last  Dose Informant Patient Reported? Taking?   acetaminophen (TYLENOL) 500 MG tablet 5/26/2022 at 1600  Yes Yes   Sig: Take 500-1,000 mg by mouth every 6 hours as needed for mild pain   gabapentin (NEURONTIN) 300 MG capsule 5/26/2022 at 0900  Yes Yes   Sig: Take 300 mg by mouth 2 times daily   ibuprofen (ADVIL/MOTRIN) 600 MG tablet 5/24/2022  Yes No   Sig: Take 600 mg by mouth every 6 hours as needed for moderate pain   lisinopril-hydrochlorothiazide (ZESTORETIC) 20-12.5 MG tablet 5/26/2022 at 1600  Yes Yes   Sig: Take 1 tablet by mouth daily   metFORMIN (GLUCOPHAGE-XR) 750 MG 24 hr tablet 5/26/2022 at 1600  Yes Yes   Sig: Take 750 mg by mouth 2 times daily (with meals)   naproxen sodium (ANAPROX) 220 MG tablet 5/24/2022  Yes Yes   Sig: Take 220 mg by mouth 2 times daily (with meals)   pantoprazole (PROTONIX) 40 MG EC tablet 5/26/2022 at 0900  Yes Yes   Sig: Take 40 mg by mouth daily      Facility-Administered Medications: None     Allergies   No Known Allergies    Social History   I have personally reviewed the social history with the patient showing.  Social History     Tobacco Use     Smoking status: Never Smoker     Smokeless tobacco: Never Used   Substance Use Topics     Alcohol use: Not Currently       Family History   I have reviewed this patient's family history and it is not contributory to the admission..        Review of Systems   Ten points ROS done and pertinent as per HPI, otherwise negative    Physical Exam   Temp: 98.2  F (36.8  C) Temp src: Temporal BP: (Abnormal) 150/75 Pulse: 111   Resp: 16 SpO2: 98 % O2 Device: Nasal cannula Oxygen Delivery: 2 LPM  Vital Signs with Ranges  Temp:  [98  F (36.7  C)-98.4  F (36.9  C)] 98.2  F (36.8  C)  Pulse:  [] 111  Resp:  [12-25] 16  BP: (104-160)/() 150/75  SpO2:  [91 %-100 %] 98 %  316 lbs 3.2 oz    GEN:  Alert, oriented x 3, appears comfortable, NAD.  HEENT:  Normocephalic/atraumatic, no scleral icterus, no nasal discharge, mouth moist.  CV:  Regular  rate and rhythm, no murmur or JVD.  S1 + S2 noted, no S3 or S4.  LUNGS:  Clear to auscultation bilaterally without rales/rhonchi/wheezing/retractions.  Symmetric chest rise on inhalation noted.  ABD:  Active bowel sounds, soft, non-tender/non-distended.  No rebound/guarding/rigidity.  EXT:  No edema or cyanosis.  No joint synovitis noted.  SKIN:  Dry to touch, no exanthems noted in the visualized areas.     Data   -Data reviewed today: All pertinent laboratory and imaging results from this encounter were reviewed. I personally reviewed the EKG tracing showing Normal sinus rhythm in the monitor.  Recent Labs   Lab 05/27/22  0641   HGB 11.3*   POTASSIUM 4.0   CR 0.78       Recent Results (from the past 24 hour(s))   XR Surgery SUZI L/T 5 Min Fluoro w Stills    Narrative    This exam was marked as non-reportable because it will not be read by a   radiologist or a [place] non-radiologist provider.               Disclaimer: This note consists of symbols derived from keyboarding, dictation and/or voice recognition software. As a result, there may be errors in the script that have gone undetected. Please consider this when interpreting information found in this chart.

## 2022-05-27 NOTE — ANESTHESIA PREPROCEDURE EVALUATION
Anesthesia Pre-Procedure Evaluation    Patient: Tere Turk   MRN: [id number] : 1985        Procedure : Procedure(s):  Lumbar 4-Sacral 1 Interbody and Posterolateral Fusion, Minimally Invasive Versus Open with Possible Bilateral Cages          Past Medical History:   Diagnosis Date     Gastroesophageal reflux disease      Hypertension      PCOS (polycystic ovarian syndrome)      Sleep apnea     bipap     WPW (Portia-Parkinson-White syndrome)     ablation       Past Surgical History:   Procedure Laterality Date     CARDIAC SURGERY  2014    ablation - WPW      SECTION  2017     COLONOSCOPY       EPIDURAL BLOCK INJECTION      injections with sedation x2 lower back     GYN SURGERY      polypectomy x2 2016 and      HC HYSTEROSALPINGOGRAM      x2     ORTHOPEDIC SURGERY Right 2020    knee arthroscopy     TUMOR EXCISION      from abdomen area, desmoid tumor, required surgery x3     WOUND DEBRIDEMENT      wound vac after  section incision (hematoma)      No Known Allergies   Social History     Tobacco Use     Smoking status: Never Smoker     Smokeless tobacco: Never Used   Substance Use Topics     Alcohol use: Not Currently      Wt Readings from Last 1 Encounters:   22 143.4 kg (316 lb 3.2 oz)        Anesthesia Evaluation   Pt has had prior anesthetic. Type: General.    No history of anesthetic complications       ROS/MED HX  ENT/Pulmonary:     (+) sleep apnea, uses CPAP,     Neurologic:  - neg neurologic ROS     Cardiovascular:     (+) hypertension-----    METS/Exercise Tolerance:     Hematologic:  - neg hematologic  ROS     Musculoskeletal:  - neg musculoskeletal ROS     GI/Hepatic:     (+) GERD, Asymptomatic on medication,     Renal/Genitourinary: Comment: Polycystic ovarian disease      Endo: Comment: BMI greater than 50    (+) Obesity,     Psychiatric/Substance Use:  - neg psychiatric ROS     Infectious Disease:  - neg infectious disease ROS     Malignancy:  - neg malignancy ROS      Other:  - neg other ROS          Physical Exam    Airway        Mallampati: II   TM distance: > 3 FB   Neck ROM: full   Mouth opening: > 3 cm    Respiratory Devices and Support         Dental  no notable dental history         Cardiovascular   cardiovascular exam normal          Pulmonary   pulmonary exam normal                OUTSIDE LABS:  CBC:   Lab Results   Component Value Date    HGB 11.3 (L) 05/27/2022     BMP:   Lab Results   Component Value Date    POTASSIUM 4.0 05/27/2022     COAGS: No results found for: PTT, INR, FIBR  POC: No results found for: BGM, HCG, HCGS  HEPATIC: No results found for: ALBUMIN, PROTTOTAL, ALT, AST, GGT, ALKPHOS, BILITOTAL, BILIDIRECT, MYRA  OTHER: No results found for: PH, LACT, A1C, SURENDRA, PHOS, MAG, LIPASE, AMYLASE, TSH, T4, T3, CRP, SED    Anesthesia Plan    ASA Status:  3   NPO Status:  NPO Appropriate    Anesthesia Type: General.     - Airway: ETT   Induction: Intravenous, Propofol.   Maintenance: Balanced.        Consents    Anesthesia Plan(s) and associated risks, benefits, and realistic alternatives discussed. Questions answered and patient/representative(s) expressed understanding.    - Discussed:     - Discussed with:  Patient         Postoperative Care    Pain management: IV analgesics, Oral pain medications, Multi-modal analgesia.   PONV prophylaxis: Ondansetron (or other 5HT-3), Dexamethasone or Solumedrol     Comments:                Vj Montalvo MD

## 2022-05-27 NOTE — ANESTHESIA CARE TRANSFER NOTE
Patient: Tere Turk    Procedure: Procedure(s):  Lumbar 4-Sacral 1 Interbody and Posterolateral Fusion, Minimally Invasive Versus Open with Possible Bilateral Cages       Diagnosis: Spondylolisthesis of lumbar region [M43.16]  Lumbosacral stenosis [M48.07]  Degeneration of lumbar intervertebral disc [M51.36]  Diagnosis Additional Information: No value filed.    Anesthesia Type:   General     Note:      Level of Consciousness: drowsy  Oxygen Supplementation: face mask    Independent Airway: airway patency satisfactory and stable  Dentition: dentition unchanged  Vital Signs Stable: post-procedure vital signs reviewed and stable  Report to RN Given: handoff report given  Patient transferred to: PACU    Handoff Report: Identifed the Patient, Identified the Reponsible Provider, Reviewed the pertinent medical history, Discussed the surgical course, Reviewed Intra-OP anesthesia mangement and issues during anesthesia, Set expectations for post-procedure period and Allowed opportunity for questions and acknowledgement of understanding      Vitals:  Vitals Value Taken Time   /73 05/27/22 0945   Temp 98  F (36.7  C) 05/27/22 0945   Pulse 89 05/27/22 0948   Resp 25 05/27/22 0948   SpO2 100 % 05/27/22 0948   Vitals shown include unvalidated device data.    Electronically Signed By: TATUM Devine CRNA  May 27, 2022  9:49 AM

## 2022-05-27 NOTE — DISCHARGE SUMMARY
Discharge Summary    Attending Physician:  Carlitos Ling MD  Admit Date: 5/27/2022    Discharge Date: 6/1/22  Primary Care Physician: System, Provider Not In    Discharge Diagnoses  Active Problems:    Fusion of spine, lumbosacral region        Discharge Exam    AAOx3 RILEY, no weakness, No new sensory deficit, dressing intact      Preliminary Discharge Medications    This list of medications is preliminary and tentative.  Please see the After Visit Summary for the final and accurate medication list.       Review of your medicines      UNREVIEWED medicines. Ask your doctor about these medicines      Dose / Directions   acetaminophen 500 MG tablet  Commonly known as: TYLENOL      Dose: 500-1,000 mg  Take 500-1,000 mg by mouth every 6 hours as needed for mild pain  Refills: 0     gabapentin 300 MG capsule  Commonly known as: NEURONTIN      Dose: 300 mg  Take 300 mg by mouth 2 times daily  Refills: 0     ibuprofen 600 MG tablet  Commonly known as: ADVIL/MOTRIN      Dose: 600 mg  Take 600 mg by mouth every 6 hours as needed for moderate pain  Refills: 0     lisinopril-hydrochlorothiazide 20-12.5 MG tablet  Commonly known as: ZESTORETIC      Dose: 1 tablet  Take 1 tablet by mouth daily  Refills: 0     metFORMIN 750 MG 24 hr tablet  Commonly known as: GLUCOPHAGE-XR      Dose: 750 mg  Take 750 mg by mouth 2 times daily (with meals)  Refills: 0     naproxen sodium 220 MG tablet  Commonly known as: ANAPROX      Dose: 220 mg  Take 220 mg by mouth 2 times daily (with meals)  Refills: 0     pantoprazole 40 MG EC tablet  Commonly known as: PROTONIX      Dose: 40 mg  Take 40 mg by mouth daily  Refills: 0              Procedures Performed and Findings  Procedure(s):  Lumbar 4-Sacral 1 Interbody and Posterolateral Fusion, Minimally Invasive Versus Open with Possible Bilateral Cages       Consultations Obtained  HOSPITALIST IP CONSULT  OCCUPATIONAL THERAPY ADULT IP CONSULT  PHYSICAL THERAPY ADULT IP CONSULT  PAIN MANAGEMENT ADULT IP  CONSULT    Code Status   Full Code    Discharge Disposition     Home     Diet on Discharge  Regular    Activity on Discharge  Your activity upon discharge: Ad javier within following limitations:  No excessive activities   No Bending, Twisting, climbing, Crawling,   No lifting more than 8 lb for 2 weeks, or 15 lb for 2 months or 25 lb for 4 months or 35 lb for 6 months  Brace for 3 months out of bed,6 months in the car       Discharge Instructions  Per [facility] instruction : [email or web address]        Follow-Up Scheduled    Follow up in 2 weeks with me or PCP for wound check ( patient's choice) if patients want to go to PCP for wound check, then f/u in my office in 1 month       Hospital Course   Hospital Course unremarkable , adequate ambulation in due time, pain controlled , cleared by PT/OT, no events         CC Dr Ling/[facility]

## 2022-05-28 ENCOUNTER — APPOINTMENT (OUTPATIENT)
Dept: PHYSICAL THERAPY | Facility: CLINIC | Age: 37
DRG: 453 | End: 2022-05-28
Attending: NEUROLOGICAL SURGERY
Payer: COMMERCIAL

## 2022-05-28 ENCOUNTER — APPOINTMENT (OUTPATIENT)
Dept: OCCUPATIONAL THERAPY | Facility: CLINIC | Age: 37
DRG: 453 | End: 2022-05-28
Attending: NEUROLOGICAL SURGERY
Payer: COMMERCIAL

## 2022-05-28 LAB
ANION GAP SERPL CALCULATED.3IONS-SCNC: 2 MMOL/L (ref 3–14)
BUN SERPL-MCNC: 11 MG/DL (ref 7–30)
CALCIUM SERPL-MCNC: 9.2 MG/DL (ref 8.5–10.1)
CHLORIDE BLD-SCNC: 106 MMOL/L (ref 94–109)
CO2 SERPL-SCNC: 27 MMOL/L (ref 20–32)
CREAT SERPL-MCNC: 0.63 MG/DL (ref 0.52–1.04)
ERYTHROCYTE [DISTWIDTH] IN BLOOD BY AUTOMATED COUNT: 14.9 % (ref 10–15)
GFR SERPL CREATININE-BSD FRML MDRD: >90 ML/MIN/1.73M2
GLUCOSE BLD-MCNC: 118 MG/DL (ref 70–99)
HCT VFR BLD AUTO: 33.5 % (ref 35–47)
HGB BLD-MCNC: 10.1 G/DL (ref 11.7–15.7)
MCH RBC QN AUTO: 23.2 PG (ref 26.5–33)
MCHC RBC AUTO-ENTMCNC: 30.1 G/DL (ref 31.5–36.5)
MCV RBC AUTO: 77 FL (ref 78–100)
PLATELET # BLD AUTO: 371 10E3/UL (ref 150–450)
POTASSIUM BLD-SCNC: 3.9 MMOL/L (ref 3.4–5.3)
RBC # BLD AUTO: 4.36 10E6/UL (ref 3.8–5.2)
SODIUM SERPL-SCNC: 135 MMOL/L (ref 133–144)
WBC # BLD AUTO: 16.6 10E3/UL (ref 4–11)

## 2022-05-28 PROCEDURE — 36415 COLL VENOUS BLD VENIPUNCTURE: CPT | Performed by: PHYSICIAN ASSISTANT

## 2022-05-28 PROCEDURE — 99232 SBSQ HOSP IP/OBS MODERATE 35: CPT | Performed by: HOSPITALIST

## 2022-05-28 PROCEDURE — 250N000013 HC RX MED GY IP 250 OP 250 PS 637: Performed by: NURSE PRACTITIONER

## 2022-05-28 PROCEDURE — 250N000011 HC RX IP 250 OP 636: Performed by: NEUROLOGICAL SURGERY

## 2022-05-28 PROCEDURE — 97530 THERAPEUTIC ACTIVITIES: CPT | Mod: GP | Performed by: PHYSICAL THERAPIST

## 2022-05-28 PROCEDURE — 120N000001 HC R&B MED SURG/OB

## 2022-05-28 PROCEDURE — 97165 OT EVAL LOW COMPLEX 30 MIN: CPT | Mod: GO | Performed by: REHABILITATION PRACTITIONER

## 2022-05-28 PROCEDURE — 97535 SELF CARE MNGMENT TRAINING: CPT | Mod: GO | Performed by: REHABILITATION PRACTITIONER

## 2022-05-28 PROCEDURE — 250N000013 HC RX MED GY IP 250 OP 250 PS 637: Performed by: NEUROLOGICAL SURGERY

## 2022-05-28 PROCEDURE — 85027 COMPLETE CBC AUTOMATED: CPT | Performed by: PHYSICIAN ASSISTANT

## 2022-05-28 PROCEDURE — 250N000013 HC RX MED GY IP 250 OP 250 PS 637: Performed by: PHYSICIAN ASSISTANT

## 2022-05-28 PROCEDURE — 80048 BASIC METABOLIC PNL TOTAL CA: CPT | Performed by: PHYSICIAN ASSISTANT

## 2022-05-28 PROCEDURE — 97162 PT EVAL MOD COMPLEX 30 MIN: CPT | Mod: GP | Performed by: PHYSICAL THERAPIST

## 2022-05-28 PROCEDURE — 250N000011 HC RX IP 250 OP 636: Performed by: PHYSICIAN ASSISTANT

## 2022-05-28 RX ORDER — HYDROXYZINE HYDROCHLORIDE 50 MG/1
50 TABLET, FILM COATED ORAL EVERY 6 HOURS
Status: DISCONTINUED | OUTPATIENT
Start: 2022-05-28 | End: 2022-05-31

## 2022-05-28 RX ORDER — GABAPENTIN 300 MG/1
600 CAPSULE ORAL 3 TIMES DAILY
Status: DISCONTINUED | OUTPATIENT
Start: 2022-05-28 | End: 2022-05-31

## 2022-05-28 RX ORDER — KETOROLAC TROMETHAMINE 30 MG/ML
30 INJECTION, SOLUTION INTRAMUSCULAR; INTRAVENOUS EVERY 6 HOURS
Status: COMPLETED | OUTPATIENT
Start: 2022-05-28 | End: 2022-05-29

## 2022-05-28 RX ADMIN — HYDROXYZINE HYDROCHLORIDE 25 MG: 25 TABLET, FILM COATED ORAL at 09:39

## 2022-05-28 RX ADMIN — HYDROMORPHONE HYDROCHLORIDE 0.4 MG: 0.2 INJECTION, SOLUTION INTRAMUSCULAR; INTRAVENOUS; SUBCUTANEOUS at 22:26

## 2022-05-28 RX ADMIN — OXYCODONE HYDROCHLORIDE 10 MG: 5 TABLET ORAL at 10:39

## 2022-05-28 RX ADMIN — HYDROXYZINE HYDROCHLORIDE 50 MG: 50 TABLET, FILM COATED ORAL at 16:19

## 2022-05-28 RX ADMIN — KETOROLAC TROMETHAMINE 15 MG: 30 INJECTION, SOLUTION INTRAMUSCULAR at 02:20

## 2022-05-28 RX ADMIN — GABAPENTIN 600 MG: 300 CAPSULE ORAL at 13:39

## 2022-05-28 RX ADMIN — METHOCARBAMOL 1000 MG: 500 TABLET ORAL at 07:55

## 2022-05-28 RX ADMIN — ACETAMINOPHEN 975 MG: 325 TABLET, FILM COATED ORAL at 21:38

## 2022-05-28 RX ADMIN — PANTOPRAZOLE SODIUM 40 MG: 40 TABLET, DELAYED RELEASE ORAL at 07:55

## 2022-05-28 RX ADMIN — LISINOPRIL AND HYDROCHLOROTHIAZIDE 1 TABLET: 12.5; 2 TABLET ORAL at 07:55

## 2022-05-28 RX ADMIN — METHOCARBAMOL 1000 MG: 500 TABLET ORAL at 02:20

## 2022-05-28 RX ADMIN — ACETAMINOPHEN 975 MG: 325 TABLET, FILM COATED ORAL at 13:40

## 2022-05-28 RX ADMIN — METHOCARBAMOL 1000 MG: 500 TABLET ORAL at 20:16

## 2022-05-28 RX ADMIN — KETOROLAC TROMETHAMINE 30 MG: 30 INJECTION, SOLUTION INTRAMUSCULAR; INTRAVENOUS at 13:40

## 2022-05-28 RX ADMIN — SENNOSIDES AND DOCUSATE SODIUM 1 TABLET: 50; 8.6 TABLET ORAL at 20:16

## 2022-05-28 RX ADMIN — HYDROXYZINE HYDROCHLORIDE 50 MG: 50 TABLET, FILM COATED ORAL at 21:39

## 2022-05-28 RX ADMIN — OXYCODONE HYDROCHLORIDE 10 MG: 5 TABLET ORAL at 04:35

## 2022-05-28 RX ADMIN — GABAPENTIN 300 MG: 100 CAPSULE ORAL at 07:55

## 2022-05-28 RX ADMIN — OXYCODONE HYDROCHLORIDE 10 MG: 5 TABLET ORAL at 16:20

## 2022-05-28 RX ADMIN — ACETAMINOPHEN 975 MG: 325 TABLET, FILM COATED ORAL at 06:10

## 2022-05-28 RX ADMIN — OXYCODONE HYDROCHLORIDE 10 MG: 5 TABLET ORAL at 00:10

## 2022-05-28 RX ADMIN — METHOCARBAMOL 1000 MG: 500 TABLET ORAL at 13:39

## 2022-05-28 RX ADMIN — SENNOSIDES AND DOCUSATE SODIUM 1 TABLET: 50; 8.6 TABLET ORAL at 07:55

## 2022-05-28 RX ADMIN — KETOROLAC TROMETHAMINE 15 MG: 30 INJECTION, SOLUTION INTRAMUSCULAR at 07:56

## 2022-05-28 RX ADMIN — GABAPENTIN 600 MG: 300 CAPSULE ORAL at 20:16

## 2022-05-28 RX ADMIN — HYDROMORPHONE HYDROCHLORIDE 0.4 MG: 0.2 INJECTION, SOLUTION INTRAMUSCULAR; INTRAVENOUS; SUBCUTANEOUS at 17:43

## 2022-05-28 RX ADMIN — OXYCODONE HYDROCHLORIDE 10 MG: 5 TABLET ORAL at 21:38

## 2022-05-28 RX ADMIN — KETOROLAC TROMETHAMINE 30 MG: 30 INJECTION, SOLUTION INTRAMUSCULAR; INTRAVENOUS at 20:15

## 2022-05-28 RX ADMIN — HYDROMORPHONE HYDROCHLORIDE 0.4 MG: 0.2 INJECTION, SOLUTION INTRAMUSCULAR; INTRAVENOUS; SUBCUTANEOUS at 12:53

## 2022-05-28 ASSESSMENT — ACTIVITIES OF DAILY LIVING (ADL)
ADLS_ACUITY_SCORE: 20
ADLS_ACUITY_SCORE: 26
ADLS_ACUITY_SCORE: 20
ADLS_ACUITY_SCORE: 28
ADLS_ACUITY_SCORE: 26
ADLS_ACUITY_SCORE: 26
ADLS_ACUITY_SCORE: 22
ADLS_ACUITY_SCORE: 26
ADLS_ACUITY_SCORE: 26
ADLS_ACUITY_SCORE: 20

## 2022-05-28 NOTE — PLAN OF CARE
"Patient vital signs are at baseline: Yes  Patient able to ambulate as they were prior to admission or with assist devices provided by therapies during their stay:  No,  Reason:  Refusing to get out of bed despite taking PRN's, stated she has tried and she will \"try again tomorrow\".   Patient MUST void prior to discharge:  No,  Reason:  Refusing to get out of bed to sit on toilet or use bedpan, wanting a purwick but has not had success.   Patient able to tolerate oral intake:  Yes  Pain has adequate pain control using Oral analgesics:  No,  Reason:  Appears to have adequate pain management but states that she does not.   Does patient have an identified :  Yes  Has goal D/C date and time been discussed with patient:  Yes    A&Ox4. VSS. Refusing to get out of bed, states she will \"try again tomorrow with therapy\", encouraged and educated on importance of getting OOB this evening and use the bathroom but she is still refusing. PRN oxy and IV dilaudid along with scheduled meds seems effective as she is able to tolerate good bed mobility. Tolerating diet. Unknown discharge plan as she will not be able to get on an airplane next week if she is refusing to get OOB. Likely will need TCU.       "

## 2022-05-28 NOTE — PROGRESS NOTES
[facility]    Hospitalist Progress Note    Date of Service (when I saw the patient): 05/28/2022  Provider:  Guillaume Rubalcava MD   Text Page  7am - 6PM       Assessment & Plan   Tere Turk is a 36 year old female who was admitted on 5/27/2022. I was asked to see the patient for management of her clinically comorbidities after surgical intervention. She presents for an elective LS fusion after failing conservative treatment for her spinal DJD and chronic pain.  She underwent a multilevel spinal fusion involving L4/L5/S1 oblique lateral interbody fusion with discectomy.     1.  Chronic lower back pain and severe DJD.  Status post multilevel vertebral interbody fusion and discectomy.  POD #1.  -Pain control, postsurgical cares, rehabilitation and DVT Prophylaxis as outlined by primary team.  2.  Essential hypertension.  -Continue her home meds same dose.  3.  DEREK.  -BiPAP with home settings.  May get help from RT if needed.  4.  GERD.  -Continue PPI as prior to admission.  5.  PCOS.  -Metformin as prior to admission.  6.  WPW syndrome.  -Successfully treated with ablation in 2014.        DVT Prophylaxis: Defer to primary service  Code Status: Full Code    Disposition: Expected discharge in 24 hours per surgeon plan    Interval History   She overall feels better but she still did not have good pain control in the right side and lower leg.  It makes difficult standing and walking as well as transitions.  She is not sure if she will be able to go home or transitional care instead.  No other issues.    -Data reviewed today: I reviewed all new labs and imaging results over the last 24 hours.     Physical Exam   Temp: 98.3  F (36.8  C) Temp src: Temporal BP: 122/63 Pulse: 109   Resp: 18 SpO2: 98 % O2 Device: None (Room air) Oxygen Delivery: 2 LPM  Vitals:    05/13/22 1106 05/27/22 0620   Weight: 145.2 kg (320 lb) 143.4 kg (316 lb 3.2 oz)     Vital Signs with Ranges  Temp:  [97.6  F (36.4  C)-98.5  F  (36.9  C)] 98.3  F (36.8  C)  Pulse:  [] 109  Resp:  [16-24] 18  BP: (102-184)/(54-83) 122/63  SpO2:  [92 %-100 %] 98 %  I/O last 3 completed shifts:  In: 3160 [P.O.:760; I.V.:2400]  Out: 3875 [Urine:3875]    GEN:  Alert, oriented x 3, appears comfortable, NAD.  HEENT:  Normocephalic/atraumatic, no scleral icterus, no nasal discharge, mouth moist.  CV:  Regular rate and rhythm, no murmur or JVD.  S1 + S2 noted, no S3 or S4.  LUNGS:  Clear to auscultation bilaterally without rales/rhonchi/wheezing/retractions.  Symmetric chest rise on inhalation noted.  ABD:  Active bowel sounds, soft, non-tender/non-distended.  No rebound/guarding/rigidity.  EXT:  No edema or cyanosis.  No joint synovitis noted.  SKIN:  Dry to touch, no exanthems noted in the visualized areas.       Medications     sodium chloride 75 mL/hr at 05/27/22 1607       acetaminophen  975 mg Oral Q8H     gabapentin  600 mg Oral TID     hydrOXYzine  50 mg Oral Q6H     ketorolac  30 mg Intravenous Q6H     lisinopril-hydrochlorothiazide  1 tablet Oral Daily     [Held by provider] metFORMIN  750 mg Oral BID w/meals     methocarbamol  1,000 mg Oral Q6H     pantoprazole  40 mg Oral Daily     polyethylene glycol  17 g Oral Daily     senna-docusate  1 tablet Oral BID     sodium chloride (PF)  3 mL Intracatheter Q8H       Data   Recent Labs   Lab 05/28/22  0648 05/27/22  0641   WBC 16.6*  --    HGB 10.1* 11.3*   MCV 77*  --      --      --    POTASSIUM 3.9 4.0   CHLORIDE 106  --    CO2 27  --    BUN 11  --    CR 0.63 0.78   ANIONGAP 2*  --    SURENDRA 9.2  --    *  --        No results found for this or any previous visit (from the past 24 hour(s)).      Securely message with the Vocera Web Console (learn more here)  Text page via [employer] Paging/Directory        Disclaimer: This note consists of symbols derived from keyboarding, dictation and/or voice recognition software. As a result, there may be errors in the script that have gone undetected.  Please consider this when interpreting information found in this chart.

## 2022-05-28 NOTE — PLAN OF CARE
"[id number] A&Ox4. Variable mood, she was upset that we were encouraging her to get out of bed to void and sit in the chair for meals. At one point she was calling the staff \"incompentent\" because she \"just had surgery and cannot get out of bed but we all keep asking her to\". VSS. Refusing to get OOB. She stated that she is unable to tolerate sitting up or standing d/t pain. PRN and scheduled meds somewhat effective for the pain. Purwick in place as she is again refusing to get OOB. Tolerating diet.   "

## 2022-05-28 NOTE — PLAN OF CARE
Patient vital signs are at baseline: Yes  Patient able to ambulate as they were prior to admission or with assist devices provided by therapies during their stay:  No,  Reason:  Pt was able to sit at the bedside during shift. Unable to ambulate due to pain.   Patient MUST void prior to discharge:  No,  Reason:  [place] remains in place due to lack of mobility.   Patient able to tolerate oral intake:  Yes  Pain has adequate pain control using Oral analgesics:  Yes, pain 6/10 at best.   Does patient have an identified :  Yes, mother.  Has goal D/C date and time been discussed with patient:  Yes.

## 2022-05-28 NOTE — PROGRESS NOTES
"   05/28/22 0845   Quick Adds   Type of Visit Initial PT Evaluation   Living Environment   People in Home spouse;child(bertha), dependent   Current Living Arrangements house   Home Accessibility stairs to enter home   Number of Stairs, Main Entrance 6   Stair Railings, Main Entrance railing on right side (ascending)   Transportation Anticipated family or friend will provide   Living Environment Comments PTA, pt. lived in single-level home with  (Fernando, works full-time from home, currently on FMLA, good physical health) and son (Isak, 4) with 6 CRYSTAL (R HR) with 1st floor B/B (walk-in shower, tub) with the following DME: adjustable bed, LSO, HHS, elevated toilet seat. Pt. lives in [place] and will be flying back home following follow-up appointment with Dr. Ling. Currently, pt. is staying in hotel with mother.   Self-Care   Usual Activity Tolerance poor   Current Activity Tolerance poor   Regular Exercise No   Equipment Currently Used at Home raised toilet seat;other (see comments);orthosis  (HHS, adjustable bed, LSO)   Fall history within last six months no   Activity/Exercise/Self-Care Comment PTA, pt. was independent with ADLs, receiving assistance for IADLs, ambulating household distances with no AD independently. This has been her level of function for the past year.   General Information   Onset of Illness/Injury or Date of Surgery 05/27/22   Referring Physician Dr. Carlitos Ling   Patient/Family Therapy Goals Statement (PT) \"I want to be able to walk better.\"   Pertinent History of Current Problem (include personal factors and/or comorbidities that impact the POC) Mrs. Turk is a 36 y.o.  female who presents to [facility] secondary to L4-S1 stenosis s/p OLLIF L4-S1/discectomy (05/27/22) with PMH significant for the following: GERD, morbid obesity, lumbar radiculopathy, DEREK, WPW syndrome s/p ablation, essential HTN, PCOS.   Existing Precautions/Restrictions brace worn when out of bed;fall;lifting;spinal "   General Observations Pt. presents lying semi-supine in hospital bed in no acute distress with mother present.   Cognition   Affect/Mental Status (Cognition) sad/depressed affect   Orientation Status (Cognition) oriented x 4   Follows Commands (Cognition) WFL   Behavioral Issues difficulty managing stress   Pain Assessment   Patient Currently in Pain Yes, see Vital Sign flowsheet   Posture    Posture Forward head position;Protracted shoulders;Kyphosis   Range of Motion (ROM)   Range of Motion ROM is WFL   ROM Comment B LE ROM WFL   Strength (Manual Muscle Testing)   Strength (Manual Muscle Testing) Deficits observed during functional mobility   Strength Comments B LE strength moderately decreased throughout B LEs secondary to post-operative pain.   Bed Mobility   Bed Mobility rolling right;supine-sit;sit-supine   Rolling Right [place] (Bed Mobility) supervision;verbal cues   Supine-Sit [place] (Bed Mobility) minimum assist (75% patient effort);verbal cues   Sit-Supine [place] (Bed Mobility) minimum assist (75% patient effort);verbal cues   Assistive Device (Bed Mobility) bed rails   Comment, (Bed Mobility) Performed supine to sit transfer, R bedrail, min. pa for intermittent assistance with trunlk management.   Transfers   Transfers bed-chair transfer   Comment, (Transfers) Unable to perform due to physical deficits. Will attempt in future sessions.   Gait/Stairs (Locomotion)   [place] Level (Gait) unable to assess   Comment, (Gait/Stairs) Unable to perform due to physical deficits. Will attempt in future sessions.   Balance   Balance other (describe)   Standing Balance: Static poor balance   Standing Balance: Dynamic poor balance   Systems Impairment Contributing to Balance Disturbance neuromuscular;musculoskeletal   Identified Impairments Contributing to Balance Disturbance impaired coordination;impaired motor control;pain;impaired postural control;decreased strength   Balance Quick Add  Standing balance: static;Standing balance: dynamic;Systems impairment contributing to balance disturbance;Identified impairments contributing to balance disturbance   Sensory Examination   Sensory Perception patient reports no sensory changes   Coordination   Coordination other (see comments)   Coordination Comments B LE motor coordination moderately impaired secondary to post-operative pain   Clinical Impression   Criteria for Skilled Therapeutic Intervention Yes, treatment indicated   PT Diagnosis (PT) Pt. presents with the aforementioned impairments, necessitating the need for skilled PT services to assist in her returm to her PLOF. Given the limited PLOF and her current limited level of function, her prognosis to return to her PLOF is fair.   Influenced by the following impairments Decreased B LE strength, decreased activity tolerance, post-operative pain, decreased static/dynamic standing balance   Functional limitations due to impairments Decreased B LE strength, decreased activity tolerance, post-operative pain, decreased static/dynamic standing balance   Clinical Presentation (PT Evaluation Complexity) Evolving/Changing   Clinical Presentation Rationale Pt.'s clinical presentation is evolving secondary to current level of post-operative pain and its impact on overall function.   Clinical Decision Making (Complexity) moderate complexity   Planned Therapy Interventions (PT) bed mobility training;cryotherapy;gait training;home exercise program;motor coordination training;neuromuscular re-education;orthotic fitting/training;patient/family education;postural re-education;ROM (range of motion);stair training;strengthening;transfer training;progressive activity/exercise   Anticipated Equipment Needs at Discharge (PT) walker, rolling   Risk & Benefits of therapy have been explained evaluation/treatment results reviewed;care plan/treatment goals reviewed;risks/benefits reviewed;current/potential barriers  reviewed;participants voiced agreement with care plan;participants included;patient;mother   PT Discharge Planning   PT Discharge Recommendation (DC Rec) home with assist   PT Rationale for DC Rec Recommend discharge home with /son and possibly mother, performing all mobility skills with FWW with mod. I, performing stair management with unilateral HR with SBA, requiring SBA for ADLs, and assistance for all IADLs to promote increased independence with mobility skills.   PT Brief overview of current status Assist of 1 - 2 with FWW   Plan of Care Review   Plan of Care Reviewed With patient;mother   Total Evaluation Time   Total Evaluation Time (Minutes) 15   Physical Therapy Goals   PT Frequency 2x/day   PT Predicted Duration/Target Date for Goal Attainment 05/30/22   PT Goals Bed Mobility;Transfers;Gait;Stairs   PT: Bed Mobility Modified independent   PT: Transfers Bed to/from chair;Supervision/stand-by assist   PT: Gait Supervision/stand-by assist;Rolling walker   PT: Stairs 6 stairs;Supervision/stand-by assist;Rail on right

## 2022-05-28 NOTE — PROGRESS NOTES
Pt painful/ restless at start of shift rating pain a 9 in hips thighs and lower back. Pain team visiting with pt. Scheduled and prn pain meds given as scheduled. With some improvement. Pt appears more comfortable and has been able to rest. Best pain rating 7. Pt refusing to log roll due to pain- unable to assess drsg. VSS requiring O2 at 2L per NC or cpap. sm amt numbness rt toes otherwise cms intact.  braulio regulatr diet. Good urine output per freitas .mother at bedside- supportive/helpful of pt

## 2022-05-28 NOTE — PROGRESS NOTES
DAILY PROGRESS NOTE    Tere Turk is a 36 year old old female admitted on 5/27/2022  5:48 AM.    Subjective  Axial pain      Objective    AAOx3, RILEY , f/c 4/4 no weakness and no sensory deficit no radiculopathy but pain limiting abmulation as expected   Ambulating with help but it is difficult     Hemoglobin   Date Value Ref Range Status   05/28/2022 10.1 (L) 11.7 - 15.7 g/dL Final   ]      Impression / Plan     Plan for today:    Patient doing well  Ambulate with help  PT OT, possibly clearance   D/c fortino this am  Full diet  Today  Wean and transition to PO meds today   Ambulation with walker   Discharge tomorrow

## 2022-05-28 NOTE — PLAN OF CARE
"Patient vital signs are at baseline: Yes  Patient able to ambulate as they were prior to admission or with assist devices provided by therapies during their stay:  No,  Reason:  PT following, able to sit at edge of bed this morning, attempted to stand, increase in pain and needed to sit immediately.  Patient MUST void prior to discharge:  No,  Reason:  Indwelling freitas discontinued at 1115, purewick in place, no void yet.  Patient able to tolerate oral intake:  Yes  Pain has adequate pain control using Oral analgesics:  No,  Reason:  rates pain 6/10 at lowest, multiple meds for pain management.  New orders for scheduled Toradol 4 more doses, atarax scheduled and continues on Robaxin and prn meds.  Does patient have an identified :  Yes  Has goal D/C date and time been discussed with patient:  Yes    A&Ox4, VSS on RA sats 100%.  LS CTA.  Sat on edge of bed with therapy, stood briefly, reports increased pain and right leg \"buckling\".   Back dressings with 2 areas of shadow drainage, marked.   Mother at bedside much of the day, supportive.  Encouraging activity, manage pain.  Discharge plans to be determined.                     "

## 2022-05-29 ENCOUNTER — APPOINTMENT (OUTPATIENT)
Dept: PHYSICAL THERAPY | Facility: CLINIC | Age: 37
DRG: 453 | End: 2022-05-29
Attending: NEUROLOGICAL SURGERY
Payer: COMMERCIAL

## 2022-05-29 ENCOUNTER — APPOINTMENT (OUTPATIENT)
Dept: GENERAL RADIOLOGY | Facility: CLINIC | Age: 37
DRG: 453 | End: 2022-05-29
Attending: HOSPITALIST
Payer: COMMERCIAL

## 2022-05-29 ENCOUNTER — APPOINTMENT (OUTPATIENT)
Dept: OCCUPATIONAL THERAPY | Facility: CLINIC | Age: 37
DRG: 453 | End: 2022-05-29
Attending: NEUROLOGICAL SURGERY
Payer: COMMERCIAL

## 2022-05-29 LAB
ALBUMIN UR-MCNC: 10 MG/DL
ANION GAP SERPL CALCULATED.3IONS-SCNC: 1 MMOL/L (ref 3–14)
APPEARANCE UR: ABNORMAL
BACTERIA #/AREA URNS HPF: ABNORMAL /HPF
BILIRUB UR QL STRIP: NEGATIVE
BUN SERPL-MCNC: 16 MG/DL (ref 7–30)
CALCIUM SERPL-MCNC: 9 MG/DL (ref 8.5–10.1)
CHLORIDE BLD-SCNC: 105 MMOL/L (ref 94–109)
CO2 SERPL-SCNC: 29 MMOL/L (ref 20–32)
COLOR UR AUTO: YELLOW
CREAT SERPL-MCNC: 0.74 MG/DL (ref 0.52–1.04)
ERYTHROCYTE [DISTWIDTH] IN BLOOD BY AUTOMATED COUNT: 15.3 % (ref 10–15)
GFR SERPL CREATININE-BSD FRML MDRD: >90 ML/MIN/1.73M2
GLUCOSE BLD-MCNC: 112 MG/DL (ref 70–99)
GLUCOSE UR STRIP-MCNC: NEGATIVE MG/DL
HCT VFR BLD AUTO: 33.6 % (ref 35–47)
HGB BLD-MCNC: 9.8 G/DL (ref 11.7–15.7)
HGB UR QL STRIP: NEGATIVE
HYALINE CASTS: 2 /LPF
KETONES UR STRIP-MCNC: NEGATIVE MG/DL
LEUKOCYTE ESTERASE UR QL STRIP: ABNORMAL
MCH RBC QN AUTO: 22.6 PG (ref 26.5–33)
MCHC RBC AUTO-ENTMCNC: 29.2 G/DL (ref 31.5–36.5)
MCV RBC AUTO: 77 FL (ref 78–100)
MUCOUS THREADS #/AREA URNS LPF: PRESENT /LPF
NITRATE UR QL: NEGATIVE
PH UR STRIP: 5.5 [PH] (ref 5–7)
PLATELET # BLD AUTO: 346 10E3/UL (ref 150–450)
POTASSIUM BLD-SCNC: 3.8 MMOL/L (ref 3.4–5.3)
PROCALCITONIN SERPL-MCNC: 0.05 NG/ML
RBC # BLD AUTO: 4.34 10E6/UL (ref 3.8–5.2)
RBC URINE: 1 /HPF
SODIUM SERPL-SCNC: 135 MMOL/L (ref 133–144)
SP GR UR STRIP: 1.02 (ref 1–1.03)
SQUAMOUS EPITHELIAL: 7 /HPF
UROBILINOGEN UR STRIP-MCNC: NORMAL MG/DL
WBC # BLD AUTO: 19.3 10E3/UL (ref 4–11)
WBC URINE: 12 /HPF

## 2022-05-29 PROCEDURE — 84145 PROCALCITONIN (PCT): CPT | Performed by: HOSPITALIST

## 2022-05-29 PROCEDURE — 250N000013 HC RX MED GY IP 250 OP 250 PS 637: Performed by: NEUROLOGICAL SURGERY

## 2022-05-29 PROCEDURE — 250N000011 HC RX IP 250 OP 636: Performed by: PHYSICIAN ASSISTANT

## 2022-05-29 PROCEDURE — 250N000013 HC RX MED GY IP 250 OP 250 PS 637: Performed by: PHYSICIAN ASSISTANT

## 2022-05-29 PROCEDURE — 99232 SBSQ HOSP IP/OBS MODERATE 35: CPT | Performed by: HOSPITALIST

## 2022-05-29 PROCEDURE — 97530 THERAPEUTIC ACTIVITIES: CPT | Mod: GP

## 2022-05-29 PROCEDURE — 36415 COLL VENOUS BLD VENIPUNCTURE: CPT | Performed by: HOSPITALIST

## 2022-05-29 PROCEDURE — 36415 COLL VENOUS BLD VENIPUNCTURE: CPT | Performed by: PHYSICIAN ASSISTANT

## 2022-05-29 PROCEDURE — 250N000011 HC RX IP 250 OP 636: Performed by: NEUROLOGICAL SURGERY

## 2022-05-29 PROCEDURE — 81001 URINALYSIS AUTO W/SCOPE: CPT | Performed by: HOSPITALIST

## 2022-05-29 PROCEDURE — 87088 URINE BACTERIA CULTURE: CPT | Performed by: HOSPITALIST

## 2022-05-29 PROCEDURE — 120N000001 HC R&B MED SURG/OB

## 2022-05-29 PROCEDURE — 71045 X-RAY EXAM CHEST 1 VIEW: CPT

## 2022-05-29 PROCEDURE — 82310 ASSAY OF CALCIUM: CPT | Performed by: PHYSICIAN ASSISTANT

## 2022-05-29 PROCEDURE — 250N000013 HC RX MED GY IP 250 OP 250 PS 637: Performed by: NURSE PRACTITIONER

## 2022-05-29 PROCEDURE — 97535 SELF CARE MNGMENT TRAINING: CPT | Mod: GO | Performed by: REHABILITATION PRACTITIONER

## 2022-05-29 PROCEDURE — 85027 COMPLETE CBC AUTOMATED: CPT | Performed by: PHYSICIAN ASSISTANT

## 2022-05-29 RX ORDER — IBUPROFEN 600 MG/1
600 TABLET, FILM COATED ORAL EVERY 6 HOURS
Status: COMPLETED | OUTPATIENT
Start: 2022-05-29 | End: 2022-05-30

## 2022-05-29 RX ORDER — OXYCODONE HYDROCHLORIDE 5 MG/1
5 TABLET ORAL
Status: DISCONTINUED | OUTPATIENT
Start: 2022-05-29 | End: 2022-05-31

## 2022-05-29 RX ORDER — OXYCODONE HYDROCHLORIDE 15 MG/1
15 TABLET ORAL
Qty: 60 TABLET | Refills: 0 | Status: ON HOLD | OUTPATIENT
Start: 2022-05-29 | End: 2022-06-09

## 2022-05-29 RX ORDER — OXYCODONE HYDROCHLORIDE 5 MG/1
15 TABLET ORAL
Status: DISCONTINUED | OUTPATIENT
Start: 2022-05-29 | End: 2022-05-31

## 2022-05-29 RX ADMIN — HYDROXYZINE HYDROCHLORIDE 50 MG: 50 TABLET, FILM COATED ORAL at 16:12

## 2022-05-29 RX ADMIN — KETOROLAC TROMETHAMINE 30 MG: 30 INJECTION, SOLUTION INTRAMUSCULAR; INTRAVENOUS at 02:01

## 2022-05-29 RX ADMIN — IBUPROFEN 600 MG: 600 TABLET, FILM COATED ORAL at 17:55

## 2022-05-29 RX ADMIN — LISINOPRIL AND HYDROCHLOROTHIAZIDE 1 TABLET: 12.5; 2 TABLET ORAL at 08:44

## 2022-05-29 RX ADMIN — METHOCARBAMOL 1000 MG: 500 TABLET ORAL at 02:01

## 2022-05-29 RX ADMIN — ACETAMINOPHEN 975 MG: 325 TABLET, FILM COATED ORAL at 14:22

## 2022-05-29 RX ADMIN — METHOCARBAMOL 1000 MG: 500 TABLET ORAL at 14:21

## 2022-05-29 RX ADMIN — SENNOSIDES AND DOCUSATE SODIUM 1 TABLET: 50; 8.6 TABLET ORAL at 08:44

## 2022-05-29 RX ADMIN — PANTOPRAZOLE SODIUM 40 MG: 40 TABLET, DELAYED RELEASE ORAL at 08:44

## 2022-05-29 RX ADMIN — OXYCODONE HYDROCHLORIDE 15 MG: 5 TABLET ORAL at 20:47

## 2022-05-29 RX ADMIN — OXYCODONE HYDROCHLORIDE 10 MG: 5 TABLET ORAL at 06:45

## 2022-05-29 RX ADMIN — KETOROLAC TROMETHAMINE 30 MG: 30 INJECTION, SOLUTION INTRAMUSCULAR; INTRAVENOUS at 08:45

## 2022-05-29 RX ADMIN — ACETAMINOPHEN 975 MG: 325 TABLET, FILM COATED ORAL at 06:39

## 2022-05-29 RX ADMIN — GABAPENTIN 600 MG: 300 CAPSULE ORAL at 08:44

## 2022-05-29 RX ADMIN — HYDROXYZINE HYDROCHLORIDE 50 MG: 50 TABLET, FILM COATED ORAL at 09:41

## 2022-05-29 RX ADMIN — OXYCODONE HYDROCHLORIDE 15 MG: 5 TABLET ORAL at 16:12

## 2022-05-29 RX ADMIN — ACETAMINOPHEN 975 MG: 325 TABLET, FILM COATED ORAL at 21:28

## 2022-05-29 RX ADMIN — HYDROMORPHONE HYDROCHLORIDE 0.4 MG: 0.2 INJECTION, SOLUTION INTRAMUSCULAR; INTRAVENOUS; SUBCUTANEOUS at 10:59

## 2022-05-29 RX ADMIN — GABAPENTIN 600 MG: 300 CAPSULE ORAL at 20:02

## 2022-05-29 RX ADMIN — GABAPENTIN 600 MG: 300 CAPSULE ORAL at 14:21

## 2022-05-29 RX ADMIN — HYDROXYZINE HYDROCHLORIDE 50 MG: 50 TABLET, FILM COATED ORAL at 04:13

## 2022-05-29 RX ADMIN — OXYCODONE HYDROCHLORIDE 15 MG: 5 TABLET ORAL at 12:35

## 2022-05-29 RX ADMIN — OXYCODONE HYDROCHLORIDE 10 MG: 5 TABLET ORAL at 09:40

## 2022-05-29 RX ADMIN — METHOCARBAMOL 1000 MG: 500 TABLET ORAL at 20:02

## 2022-05-29 RX ADMIN — HYDROXYZINE HYDROCHLORIDE 50 MG: 50 TABLET, FILM COATED ORAL at 21:27

## 2022-05-29 RX ADMIN — SENNOSIDES AND DOCUSATE SODIUM 1 TABLET: 50; 8.6 TABLET ORAL at 20:02

## 2022-05-29 RX ADMIN — METHOCARBAMOL 1000 MG: 500 TABLET ORAL at 08:44

## 2022-05-29 ASSESSMENT — ACTIVITIES OF DAILY LIVING (ADL)
ADLS_ACUITY_SCORE: 28

## 2022-05-29 NOTE — PLAN OF CARE
Patient vital signs are at baseline: Yes  Patient able to ambulate as they were prior to admission or with assist devices provided by therapies during their stay:  No,  Reason:  Ax2 heavy to [facility] with back brace.   Patient MUST void prior to discharge:  Yes  Patient able to tolerate oral intake:  Yes  Pain has adequate pain control using Oral analgesics:  Yes  Does patient have an identified :  Yes  Has goal D/C date and time been discussed with patient:  Yes     Pt was able to get up with Ax2 to the [facility] to void. Pain managed with current regimen. Sleeping well throughout the night. Dressing x2, small dried drainage, ice applied. No major event tonight.

## 2022-05-29 NOTE — PROGRESS NOTES
[facility]    Hospitalist Progress Note    Date of Service (when I saw the patient): 05/29/2022  Provider:  Guillaume Rubalcava MD   Text Page  7am - 6PM       Assessment & Plan   Tere Turk is a 36 year old female who was admitted on 5/27/2022. I was asked to see the patient for managemen t of her clinically comorbidities after surgical intervention. She presents for an elective LS fusion after failing conservative treatment for her spinal DJD and chronic pain.  She underwent a multilevel spinal fusion involving L4/L5/S1 oblique lateral interbody fusion with discectomy.     1.  Chronic lower back pain and severe DJD.  Status post multilevel vertebral interbody fusion and discectomy.  POD # 2.  -Pain control, postsurgical cares, rehabilitation and DVT Prophylaxis as outlined by primary team.  2.  Essential hypertension.  -Continue her home meds same dose.  3.  DEREK.  -BiPAP with home settings.  May get help from RT if needed.  4.  GERD.  -PPI as prior to admission.  5.  PCOS.  -Metformin as prior to admission.  6.  WPW syndrome.  -Successfully treated with ablation in 2014.        DVT Prophylaxis: Defer to primary service  Code Status: Full Code    Disposition: Expected discharge today or in 24 hours depending on surgeon plan    Interval History   She is better but she is not having good pain control in the right side and lower leg.  She is slept well, she relieved when she is having low-grade fever but denied urinary symptoms, respiratory symptoms or gastrointestinal symptoms.  I will check urine, procalcitonin and obtain a chest x-ray.  She is not sure if she will be able to go home or transitional care instead.  No other issues.     -Data reviewed today: I reviewed all new labs and imaging results over the last 24 hours.     Physical Exam   Temp: 99.3  F (37.4  C) Temp src: Temporal BP: 123/51 Pulse: 105   Resp: 22 SpO2: 98 % O2 Device: None (Room air)    Vitals:    05/13/22 1106 05/27/22  0620   Weight: 145.2 kg (320 lb) 143.4 kg (316 lb 3.2 oz)     Vital Signs with Ranges  Temp:  [98.3  F (36.8  C)-99.3  F (37.4  C)] 99.3  F (37.4  C)  Pulse:  [105-111] 105  Resp:  [16-22] 22  BP: (111-131)/(51-63) 123/51  SpO2:  [95 %-99 %] 98 %  I/O last 3 completed shifts:  In: 240 [P.O.:240]  Out: 650 [Urine:650]    GEN:  Alert, oriented x 3, appears comfortable, NAD.  HEENT:  Normocephalic/atraumatic, no scleral icterus, no nasal discharge, mouth moist.  CV:  Regular rate and rhythm, no murmur or JVD.  S1 + S2 noted, no S3 or S4.  LUNGS:  Clear to auscultation bilaterally without rales/rhonchi/wheezing/retractions.  Symmetric chest rise on inhalation noted.  ABD:  Active bowel sounds, soft, non-tender/non-distended.  No rebound/guarding/rigidity.  EXT:  No edema or cyanosis.  No joint synovitis noted.  SKIN:  Dry to touch, no exanthems noted in the visualized areas.       Medications     sodium chloride 75 mL/hr at 05/27/22 1607       acetaminophen  975 mg Oral Q8H     gabapentin  600 mg Oral TID     hydrOXYzine  50 mg Oral Q6H     ketorolac  30 mg Intravenous Q6H     lisinopril-hydrochlorothiazide  1 tablet Oral Daily     [Held by provider] metFORMIN  750 mg Oral BID w/meals     methocarbamol  1,000 mg Oral Q6H     pantoprazole  40 mg Oral Daily     polyethylene glycol  17 g Oral Daily     senna-docusate  1 tablet Oral BID     sodium chloride (PF)  3 mL Intracatheter Q8H       Data   Recent Labs   Lab 05/29/22  0727 05/28/22  0648 05/27/22  0641   WBC 19.3* 16.6*  --    HGB 9.8* 10.1* 11.3*   MCV 77* 77*  --     371  --     135  --    POTASSIUM 3.8 3.9 4.0   CHLORIDE 105 106  --    CO2 29 27  --    BUN 16 11  --    CR 0.74 0.63 0.78   ANIONGAP 1* 2*  --    SURENDRA 9.0 9.2  --    * 118*  --        No results found for this or any previous visit (from the past 24 hour(s)).      Securely message with the Vocera Web Console (learn more here)  Text page via [employer] Paging/Directory        Disclaimer:  This note consists of symbols derived from keyboarding, dictation and/or voice recognition software. As a result, there may be errors in the script that have gone undetected. Please consider this when interpreting information found in this chart.

## 2022-05-29 NOTE — PLAN OF CARE
Patient vital signs are at baseline: No,  Reason:  HR tachy at 105, temp 99.3  Patient able to ambulate as they were prior to admission or with assist devices provided by therapies during their stay:  No,  Reason:  Heavy assist 2 with Mari Catalan, difficulty bearing weight on RLE.  Patient MUST void prior to discharge:  Yes  Patient able to tolerate oral intake:  Yes  Pain has adequate pain control using Oral analgesics:  No,  Reason:  reports pain levels of 6-10/10.  Does patient have an identified :  Yes  Has goal D/C date and time been discussed with patient:  Yes    A&Ox4.  Requires much encouragement to get out of bed and increase activity.  Up with Mari Catalan and assist 2, difficult to stand.  Up to BR, voided large amount, UA/UC sent, large loose BM.  Sat on toilet for greater than 20 minutes, c/o dizziness, returned to bed.   Back dressing with old drainage,marked, no new drainage.  CMS intact, denies numbness and tingling.  Pain meds reviewed with patient, new order for increase in Oxycodone dosage, discontinued IV Pain med.  Ice applied to back and leg for comfort.  Plan for increase in activity, PT/OT following.  TCU at discharge if not improved.

## 2022-05-29 NOTE — INTERIM SUMMARY
[facility] Pre-Admission Screen    Referral Source:  [facility] ORTHO SPINE  ORTHO SPINE  Admit date to referring facility: 5/27/2022    Physical Medicine and Rehab Consult Completed: No    Rehab Diagnosis:    Neurologic Conditions 03.9 Other Neurologic: s/p L4/5/S1 oblique lateral lumbar interbody fusion with discectomy    Justification for Acute Inpatient Rehabilitation  Tere Turk is a 36 year old female w/ PMH significant for severe obesity (BMI 52), PCOS, obstructive sleep apnea on BiPAP, GERD, essential hypertension, remote history of WPW with successful ablation performed in 2014, and chronic pain.  She presents for an elective LS fusion after failing conservative treatment for her spinal DJD, L5-S1 grade 2 spondylolisthesis, lumbosacral radiculopathy at S1, B pars defect, L4/5 DDD HNP forminal and lateral recess stenosis.  She is now s/p L4/5/S1 oblique lateral lumbar interbody fusion with discectomy on 5/27/22. She has required medical mgmt of her pain and bowels post-operatively. She has also noted to have a rising WBC from Klebsiella pneumoniae UTI and initiated abx therapy.     She is medically stable and ready to discharge to acute inpatient rehab. Patient requires an intensive inpatient rehab program to address the following acute impairments: post-surgical pain, weakness, impaired balance, impaired weight shifting d/t pain, fatigue, impaired activity tolerance, and post-surgical spine precautions. These impairments are contributing to functional limitations, impacting her ability to safely and independently perform bed mobility, transfers, gait, stairs, ADLs, and IADLs.     Current Active Medical Management Needs/Risks for Clinical Complications  The patient requires the high level of rehabilitation physician supervision that accompanies the provision of intensive rehabilitation therapy.  The patient needs the services of the rehabilitation physician to  assess the patient medically and functionally and to modify the course of treatment as needed to maximize the patient's capacity to benefit from the rehabilitation process.  The patient requires physician oversight at least 3x/wk to medically manage and assess:      Musculoskeletal and neurologic needs following L4/5/S1 fusion: assess neuro status regularly, assess and manage incision to promote healing and prevent infection-- currently on Lovenox for DVT ppx     Cardiac: in the setting of HTN-- currently on Zestoretic    Pain: will need ongoing assessment of pain medications and adjustment as indicated in the setting of post surgical pain, assess and manage-- currently on Neurontin, oxycodone, Atarax, Robaxin, Tylenol, ibuprofen    Klebsiella pneumoniae UTI: currently on Rocephin and will need ongoing assessment of recurrent UTI once completion of antibiotics    Bowels: pt at risk for opioid induced constipation-- currently on Miralax, Senokot    BMI 52.62: Super obesity puts patient at increased risk for complications and mortality.  Pt requires specialty equipment to facilitate safe advancement of functional mobility given super obesity.     The patient is at risk for falls due to weakness, pain, impaired weight shift; medical complications in the setting of morbid obesity with BMI of 52; recurrent UTI in the setting of UTI on Rocephin; ongoing opioid induced constipation, sleep disturbance, delirium in the setting of significant pain management regiment.    Past Medical/Surgical History  Surgery in the past 100 days: Yes  Additional relevant past medical history:  severe obesity (BMI 52), PCOS, obstructive sleep apnea on BiPAP, GERD, essential hypertension, remote history of WPW with successful ablation performed in 2014, chronic pain, spinal DJD, L5-S1 grade 2 spondylolisthesis, lumbosacral radiculopathy at S1, B pars defect, L4/5 DDD HNP forminal and lateral recess stenosis.     Level of Functioning Prior to  Admission:    LIVING ENVIRONMENT  People in Home: spouse, 5 yo son; parent(s)  Number of Stairs, Main Entrance: 6  Stair Railings, Main Entrance: railing on right side (ascending)  Transportation Anticipated: family or friend will provide  Living Environment Comments: PTA, pt. lived in single-level home with  (Fernando, works full-time from home, currently on FMLA, good physical health) and son (Isak, 4) with 6 CRYSTAL (R HR) with 1st floor B/B (walk-in shower, tub) with the following DME: adjustable bed, LSO, HHS, elevated toilet seat. Pt. lives in [place].    SELF-CARE  Usual Activity Tolerance: fair  Regular Exercise: No  Equipment Currently Used at Home: raised toilet seat, other (see comments), orthosis  Activity/Exercise/Self-Care Comment: PTA, pt. was independent with ADLs, receiving assistance for IADLs, ambulating household distances with no AD independently. This has been her level of function for the past year. Reports walking 1 block prior to admission and standing tolerance of 2-5 minutes.     Level of Function: GG Scale (Section GG Functional Ability and Goals; CMS's GARCIA Version 3.0 Manual effective 10.1.2019):  PT Current Function Goals for Rehab   Bed Rolling 4 Supervision or touching assitance 6 Independent   Supine to Sit 1 Dependent (A of 2) 6 Independent   Sit to Stand 1 Dependent 6 Independent   Transfer 1 Dependent 6 Independent   Ambulation 1 Dependent 6 Independent   Stairs Not completed 4 Supervision or touching assitance     OT Current Function Goals for Rehab   Feeding 5 Setup or clean-up assistance 6 Independent   Grooming Not completed 6 Independent   Bathing Not completed 4 Supervision or touching assitance   Upper Body Dressing 1 Dependent (TLSO) 4 Supervision or touching assitance   Lower Body Dressing 1 Dependent 6 Independent   Toileting 1 Dependent 6 Independent   Toilet Transfer 1 Dependent 6 Independent   Tub/Shower Transfer Not completed 4 Supervision or touching assitance    Cognition Not Impaired Not applicable     SLP Current Function Goals for Rehab   Swallow Not Impaired Not applicable   Communication Not Impaired Not applicable     Current Diet:  0-Thin and 7-Regular/easy to chew    Summary Statement:  Patient is participating in daily PT and OT and is making progress. In OT the patient performs LB dressing training while supine. Pt re-educated on compensatory technique and LH AE for LB dressing while maintaining spinal precautions. Pt able to doff B socks with use of reacher with set up. Pt able to thread and don B socks with use of sock aide and min A for readjustment, attempted to readjust with reacher, however unable due to current positioning. Ongoing education provided for ADLs while maintaining spinal precautions, figure 4 position, etc, verbalized understanding. In PT the patient agreeable to plan for standing with bilateral upper extremity support on FWW, patient agreeble to needing to use bathroom.  Patient completes supine > sit transfer mod I with upper extremity support on right bed rail, demonstrates appropriate logroll for spinal neutral.  Back brace donned with patient seated at EOB.  Mari Stedy to facilitate bed to toilet transfer, sit > stand with modAx1.  At toilet, patient requires modAx2 for stand > sit transfer and is dependent to doff shorts as she requires heavy upper extremity support on grab bar of Mari Stedy.  Patient needing increased assistance mod-maxAx2 for sit > stand transfer from lower toilet height secondary to increased fatigue and back pain.  Patient encouraged for pursed lip breathing throughout transfer to minimize valsalva, which increased intraabdominal pressure and potentially pressure on low back.  Patient agreeble to sit up in recliner chair but declines trialing sit <> stand from chair with FWW due to elevated pain.      Expected Therapies and Services Required During Inpatient Rehab Admission  Intensity of Therapy: Patient requires  intensive therapies not available in a lesser level of care. Patient is motivated, making gains, and can tolerate 3 hours of therapy a day.  Physical Therapy: 90 minutes per day, 7 days a week for 21 days  Occupational Therapy: 90 minutes per day, 7 days a week for 21 days  Speech and Language Therapy: No SLP needs anticipated.  Rehabilitation Nursing Needs: Patient requires 24 hour Rehab Nursing to manage wound care, bowel program, vitals, medication education, positioning, carryover of new rehab techniques, care coordination, blood sugar management, assess neurologic status, pain management and provide safe environment for patient at falls risk.    Precautions/restrictions/special needs:  Precautions: fall precautions and spinal precautions  Restrictions:  Brace to be worn all times OOB.  Special Needs: NA    Expected Level of Improvement: Anticipate with intensive therapies, close medical management, and rehabilitative nursing care the patient will improve strength, balance, tolerance to activity, safety to ensure Mod I with basic mobility and ADL performance to allow return home with family A and ongoing OP therapies.   Expected Length of time to achieve: 21 days    Anticipated Discharge Needs:  Anticipated Discharge Destination: Home  Anticipated Discharge Support: Family member  24/7 support available : Unknown  Identified caregiver(s):  spouse  Anticipated Discharge Needs: Home with homecare    Identified challenges/barriers: pain (acute on chronic)    Rehab Admission Liaison Signature/Date/Time:     Physician statement of review and agreement:  I have reviewed and am in agreement of the need for IRF stay to address above functional and medical needs. In addition to above statements address, Patient requires intensive active and ongoing therapeutic intervention and multiple therapies; Patient requires medical supervision; Expected to actively participate in the intensive rehab program; Sufficiently stable to  actively participate; Expectation for measurable improvement in functional capacity or adaption to impairments.    Physician Signature/Date/Time:

## 2022-05-29 NOTE — PROGRESS NOTES
Patient unable to complete therapy d/t pain. Requesting further pain control. Dr. Ling contacted. Ordered 30mg PO ketorolac q6h for 4 doses.    Ja Bull RN

## 2022-05-29 NOTE — PROGRESS NOTES
"DAILY PROGRESS NOTE    Tere Turk is a 36 year old old female admitted on 5/27/2022  5:48 AM.    Subjective  Pain better controled but still reports significant axial pain      Objective    AAOx3, RILEY , f/c 4/4  No radiculopathy  No weakness   Ambulating,     Hemoglobin   Date Value Ref Range Status   05/29/2022 9.8 (L) 11.7 - 15.7 g/dL Final   ]  /51 (BP Location: Left arm)   Pulse 105   Temp 99.3  F (37.4  C) (Temporal)   Resp 22   Ht 1.651 m (5' 5\")   Wt 143.4 kg (316 lb 3.2 oz)   SpO2 98%   BMI 52.62 kg/m        Impression / Plan       Plan for today:    Patient doing well  Ambulate   PT OT,   If not cleared we will  Consider TCU   With higher BMI mechanical stress is higher and pain limits ambulation so TCU maybe necessary           "

## 2022-05-29 NOTE — CONSULTS
Care Management Note    Length of Stay (days): 2    Expected Discharge Date: 05/30/2022     Concerns to be Addressed:       Patient plan of care discussed at interdisciplinary rounds: Yes    Anticipated Discharge Disposition: Acute Rehab  Disposition Comments: ARU assessing  Anticipated Discharge Services: None  Anticipated Discharge DME:      Patient/family educated on Medicare website which has current facility and service quality ratings:    Education Provided on the Discharge Plan:    Patient/Family in Agreement with the Plan: yes    Referrals Placed by CM/SW: Post Acute Facilities  Private pay costs discussed: insurance costs tbd    Additional Information:  Therapies currently recommending ARU at discharge. Pt is from [place] was here for back surgery and was expected discharge home on Wednesday, back to [place]. CM contacted ARU for referral, pt clinical looks appropriate but would need her pain better controlled and off IV pain medications. This plan was discussed with the pt and her mother, who are both in agreement. Pt would need prior auth from her [employer] plan, this would not be able to get processed until Tuesday at the earliest with the weekend and holiday.     CM will continue to follow with discharge to ARU if does not improve enough to return home.    Sofi Guzman RN, BSN CTS  Care Coordinator  [facility]   [phone]

## 2022-05-29 NOTE — PROGRESS NOTES
05/28/22 7585       Present no   Living Environment   People in Home spouse;child(bertha), dependent   Current Living Arrangements house   Home Accessibility stairs to enter home   Number of Stairs, Main Entrance 6   Stair Railings, Main Entrance railing on right side (ascending)   Transportation Anticipated family or friend will provide   Living Environment Comments PTA, pt. lived in single-level home with  (Fernando, works full-time from home, currently on FMLA, good physical health) and son (Isak, Kailey) with 6 CRYSTAL (R HR) with 1st floor B/B (walk-in shower, tub) with the following DME: adjustable bed, LSO, HHS, elevated toilet seat. Pt. lives in [place] and will be flying back home following follow-up appointment with Dr. Ling. Currently, pt. is staying in hotel with mother.   Self-Care   Usual Activity Tolerance fair   Current Activity Tolerance poor   Regular Exercise No   Equipment Currently Used at Home raised toilet seat;other (see comments);orthosis   Fall history within last six months no   Activity/Exercise/Self-Care Comment PTA, pt. was independent with ADLs, receiving assistance for IADLs, ambulating household distances with no AD independently. This has been her level of function for the past year.   General Information   Onset of Illness/Injury or Date of Surgery 05/27/22   Referring Physician Dr. Carlitos Ling   Patient/Family Therapy Goal Statement (OT) return to homein [place] with assist of family   Additional Occupational Profile Info/Pertinent History of Current Problem Mrs. Turk is a 36 y.o.  female who presents to [facility] secondary to L4-S1 stenosis s/p OLLIF L4-S1/discectomy (05/27/22) with PMH significant for the following: GERD, morbid obesity, lumbar radiculopathy, DEREK, WPW syndrome s/p ablation, essential HTN, PCOS.   Existing Precautions/Restrictions spinal   General Observations and Info activity:  up with assist, brace on OOB   Cognitive Status Examination    Orientation Status orientation to person, place and time   Behavioral Issues difficulty managing stress   Affect/Mental Status (Cognitive) anxious   Follows Commands WFL   Visual Perception   Visual Impairment/Limitations WFL   Sensory   Sensory Quick Adds No deficits were identified   Pain Assessment   Patient Currently in Pain Yes, see Vital Sign flowsheet   Integumentary/Edema   Integumentary/Edema no deficits were identifed   Posture   Posture not impaired   Range of Motion Comprehensive   General Range of Motion no range of motion deficits identified   Strength Comprehensive (MMT)   General Manual Muscle Testing (MMT) Assessment other (see comments)   Comment, General Manual Muscle Testing (MMT) Assessment B UE strength not formally assessed secondary to spine precautions.  Grossly 4/5 per observation.   Muscle Tone Assessment   Muscle Tone Quick Adds No deficits were identified   Coordination   Upper Extremity Coordination No deficits were identified   Bed Mobility   Comment (Bed Mobility) max A to roll/reposition   Transfers   Transfer Comments max A x 2 for sit/stand   Balance   Balance Comments poor   Upper Body Dressing Assessment/Training   Walker Level (Upper Body Dressing) maximum assist (25% patient effort)   Lower Body Dressing Assessment/Training   Walker Level (Lower Body Dressing) dependent (less than 25% patient effort)   Grooming Assessment/Training   Walker Level (Grooming) moderate assist (50% patient effort)   Comment, (Grooming) sitting up in bed   Toileting   Walker Level (Toileting) dependent (less than 25% patient effort)   Clinical Impression   Criteria for Skilled Therapeutic Interventions Met (OT) Yes, treatment indicated   OT Diagnosis decreased ADL performance   Influenced by the following impairments pain, precautions   OT Problem List-Impairments impacting ADL problems related to;balance;fear & anxiety;mobility;strength;pain;post-surgical precautions    Assessment of Occupational Performance 5 or more Performance Deficits   Identified Performance Deficits Pt with decreased ability to manage dressing, bathing, toileting, cooking, homemaking, childcare   Planned Therapy Interventions (OT) ADL retraining;transfer training   Clinical Decision Making Complexity (OT) low complexity   Anticipated Equipment Needs Upon Discharge (OT) raised toilet seat;shower chair;dressing equipment   Risk & Benefits of therapy have been explained care plan/treatment goals reviewed;risks/benefits reviewed   OT Discharge Planning   OT Discharge Recommendation (DC Rec) Transitional Care Facility;home with assist;home with home care occupational therapy   OT Rationale for DC Rec Pt currently unable to tolerate OOB for transfers/mobility. Max-total A for ADLs.  Pt unable to transfer safely to [facility] or fly back to [place] at this time.  She would benefit from IP OT followed by TCU to maximize safety and participation in self cares.   Total Evaluation Time (Minutes)   Total Evaluation Time (Minutes) 8   OT Goals   Therapy Frequency (OT) Daily   OT Predicted Duration/Target Date for Goal Attainment 06/02/22   OT: Hygiene/Grooming minimal assist   OT: Upper Body Dressing Minimal assist   OT: Lower Body Dressing Moderate assist   OT: Transfer Minimal assist   OT: Toilet Transfer/Toileting Minimal assist

## 2022-05-30 ENCOUNTER — APPOINTMENT (OUTPATIENT)
Dept: PHYSICAL THERAPY | Facility: CLINIC | Age: 37
DRG: 453 | End: 2022-05-30
Attending: NEUROLOGICAL SURGERY
Payer: COMMERCIAL

## 2022-05-30 ENCOUNTER — APPOINTMENT (OUTPATIENT)
Dept: OCCUPATIONAL THERAPY | Facility: CLINIC | Age: 37
DRG: 453 | End: 2022-05-30
Attending: NEUROLOGICAL SURGERY
Payer: COMMERCIAL

## 2022-05-30 LAB
ANION GAP SERPL CALCULATED.3IONS-SCNC: 6 MMOL/L (ref 3–14)
BUN SERPL-MCNC: 23 MG/DL (ref 7–30)
CALCIUM SERPL-MCNC: 8.8 MG/DL (ref 8.5–10.1)
CHLORIDE BLD-SCNC: 103 MMOL/L (ref 94–109)
CO2 SERPL-SCNC: 27 MMOL/L (ref 20–32)
CREAT SERPL-MCNC: 1.17 MG/DL (ref 0.52–1.04)
D DIMER PPP FEU-MCNC: 2.79 UG/ML FEU (ref 0–0.5)
ERYTHROCYTE [DISTWIDTH] IN BLOOD BY AUTOMATED COUNT: 15.4 % (ref 10–15)
GFR SERPL CREATININE-BSD FRML MDRD: 62 ML/MIN/1.73M2
GLUCOSE BLD-MCNC: 123 MG/DL (ref 70–99)
HCT VFR BLD AUTO: 32.1 % (ref 35–47)
HGB BLD-MCNC: 9.5 G/DL (ref 11.7–15.7)
MCH RBC QN AUTO: 23 PG (ref 26.5–33)
MCHC RBC AUTO-ENTMCNC: 29.6 G/DL (ref 31.5–36.5)
MCV RBC AUTO: 78 FL (ref 78–100)
PLATELET # BLD AUTO: 377 10E3/UL (ref 150–450)
POTASSIUM BLD-SCNC: 3.7 MMOL/L (ref 3.4–5.3)
RBC # BLD AUTO: 4.13 10E6/UL (ref 3.8–5.2)
SARS-COV-2 RNA RESP QL NAA+PROBE: NEGATIVE
SODIUM SERPL-SCNC: 136 MMOL/L (ref 133–144)
WBC # BLD AUTO: 21.6 10E3/UL (ref 4–11)

## 2022-05-30 PROCEDURE — 250N000011 HC RX IP 250 OP 636: Performed by: HOSPITALIST

## 2022-05-30 PROCEDURE — 99233 SBSQ HOSP IP/OBS HIGH 50: CPT | Performed by: HOSPITALIST

## 2022-05-30 PROCEDURE — 120N000001 HC R&B MED SURG/OB

## 2022-05-30 PROCEDURE — 250N000011 HC RX IP 250 OP 636: Performed by: NEUROLOGICAL SURGERY

## 2022-05-30 PROCEDURE — 36415 COLL VENOUS BLD VENIPUNCTURE: CPT | Performed by: PHYSICIAN ASSISTANT

## 2022-05-30 PROCEDURE — U0003 INFECTIOUS AGENT DETECTION BY NUCLEIC ACID (DNA OR RNA); SEVERE ACUTE RESPIRATORY SYNDROME CORONAVIRUS 2 (SARS-COV-2) (CORONAVIRUS DISEASE [COVID-19]), AMPLIFIED PROBE TECHNIQUE, MAKING USE OF HIGH THROUGHPUT TECHNOLOGIES AS DESCRIBED BY CMS-2020-01-R: HCPCS | Performed by: NEUROLOGICAL SURGERY

## 2022-05-30 PROCEDURE — 258N000003 HC RX IP 258 OP 636: Performed by: HOSPITALIST

## 2022-05-30 PROCEDURE — 80048 BASIC METABOLIC PNL TOTAL CA: CPT | Performed by: PHYSICIAN ASSISTANT

## 2022-05-30 PROCEDURE — 250N000013 HC RX MED GY IP 250 OP 250 PS 637: Performed by: NEUROLOGICAL SURGERY

## 2022-05-30 PROCEDURE — 97530 THERAPEUTIC ACTIVITIES: CPT | Mod: GP | Performed by: PHYSICAL THERAPIST

## 2022-05-30 PROCEDURE — 36415 COLL VENOUS BLD VENIPUNCTURE: CPT | Performed by: HOSPITALIST

## 2022-05-30 PROCEDURE — 250N000013 HC RX MED GY IP 250 OP 250 PS 637: Performed by: PHYSICIAN ASSISTANT

## 2022-05-30 PROCEDURE — 250N000013 HC RX MED GY IP 250 OP 250 PS 637: Performed by: NURSE PRACTITIONER

## 2022-05-30 PROCEDURE — 87040 BLOOD CULTURE FOR BACTERIA: CPT | Performed by: HOSPITALIST

## 2022-05-30 PROCEDURE — 97535 SELF CARE MNGMENT TRAINING: CPT | Mod: GO

## 2022-05-30 PROCEDURE — 85027 COMPLETE CBC AUTOMATED: CPT | Performed by: PHYSICIAN ASSISTANT

## 2022-05-30 PROCEDURE — 36415 COLL VENOUS BLD VENIPUNCTURE: CPT | Performed by: NEUROLOGICAL SURGERY

## 2022-05-30 PROCEDURE — 85379 FIBRIN DEGRADATION QUANT: CPT | Performed by: NEUROLOGICAL SURGERY

## 2022-05-30 RX ORDER — SODIUM CHLORIDE 9 MG/ML
INJECTION, SOLUTION INTRAVENOUS CONTINUOUS
Status: DISCONTINUED | OUTPATIENT
Start: 2022-05-30 | End: 2022-05-31

## 2022-05-30 RX ORDER — ENOXAPARIN SODIUM 100 MG/ML
40 INJECTION SUBCUTANEOUS EVERY 12 HOURS
Status: DISCONTINUED | OUTPATIENT
Start: 2022-05-30 | End: 2022-06-01 | Stop reason: HOSPADM

## 2022-05-30 RX ORDER — CEFTRIAXONE 2 G/1
2 INJECTION, POWDER, FOR SOLUTION INTRAMUSCULAR; INTRAVENOUS EVERY 24 HOURS
Status: DISCONTINUED | OUTPATIENT
Start: 2022-05-30 | End: 2022-06-01 | Stop reason: HOSPADM

## 2022-05-30 RX ADMIN — PANTOPRAZOLE SODIUM 40 MG: 40 TABLET, DELAYED RELEASE ORAL at 08:47

## 2022-05-30 RX ADMIN — METHOCARBAMOL 1000 MG: 500 TABLET ORAL at 08:47

## 2022-05-30 RX ADMIN — HYDROXYZINE HYDROCHLORIDE 50 MG: 50 TABLET, FILM COATED ORAL at 04:05

## 2022-05-30 RX ADMIN — HYDROXYZINE HYDROCHLORIDE 50 MG: 50 TABLET, FILM COATED ORAL at 10:32

## 2022-05-30 RX ADMIN — ACETAMINOPHEN 975 MG: 325 TABLET, FILM COATED ORAL at 05:58

## 2022-05-30 RX ADMIN — METHOCARBAMOL 1000 MG: 500 TABLET ORAL at 21:01

## 2022-05-30 RX ADMIN — OXYCODONE HYDROCHLORIDE 15 MG: 5 TABLET ORAL at 18:47

## 2022-05-30 RX ADMIN — IBUPROFEN 600 MG: 600 TABLET, FILM COATED ORAL at 12:03

## 2022-05-30 RX ADMIN — ENOXAPARIN SODIUM 40 MG: 40 INJECTION SUBCUTANEOUS at 22:25

## 2022-05-30 RX ADMIN — HYDROXYZINE HYDROCHLORIDE 50 MG: 50 TABLET, FILM COATED ORAL at 22:24

## 2022-05-30 RX ADMIN — GABAPENTIN 600 MG: 300 CAPSULE ORAL at 14:33

## 2022-05-30 RX ADMIN — GABAPENTIN 600 MG: 300 CAPSULE ORAL at 08:47

## 2022-05-30 RX ADMIN — HYDROXYZINE HYDROCHLORIDE 50 MG: 50 TABLET, FILM COATED ORAL at 16:58

## 2022-05-30 RX ADMIN — OXYCODONE HYDROCHLORIDE 15 MG: 5 TABLET ORAL at 08:55

## 2022-05-30 RX ADMIN — OXYCODONE HYDROCHLORIDE 15 MG: 5 TABLET ORAL at 15:23

## 2022-05-30 RX ADMIN — METHOCARBAMOL 1000 MG: 500 TABLET ORAL at 02:13

## 2022-05-30 RX ADMIN — CEFTRIAXONE 2 G: 2 INJECTION, POWDER, FOR SOLUTION INTRAMUSCULAR; INTRAVENOUS at 15:22

## 2022-05-30 RX ADMIN — SODIUM CHLORIDE: 9 INJECTION, SOLUTION INTRAVENOUS at 15:14

## 2022-05-30 RX ADMIN — IBUPROFEN 600 MG: 600 TABLET, FILM COATED ORAL at 05:58

## 2022-05-30 RX ADMIN — METHOCARBAMOL 1000 MG: 500 TABLET ORAL at 14:34

## 2022-05-30 RX ADMIN — IBUPROFEN 600 MG: 600 TABLET, FILM COATED ORAL at 00:23

## 2022-05-30 RX ADMIN — GABAPENTIN 600 MG: 300 CAPSULE ORAL at 21:01

## 2022-05-30 RX ADMIN — LISINOPRIL AND HYDROCHLOROTHIAZIDE 1 TABLET: 12.5; 2 TABLET ORAL at 08:47

## 2022-05-30 RX ADMIN — ENOXAPARIN SODIUM 40 MG: 40 INJECTION SUBCUTANEOUS at 12:02

## 2022-05-30 RX ADMIN — OXYCODONE HYDROCHLORIDE 15 MG: 5 TABLET ORAL at 12:03

## 2022-05-30 RX ADMIN — ACETAMINOPHEN 650 MG: 325 TABLET, FILM COATED ORAL at 18:47

## 2022-05-30 ASSESSMENT — ACTIVITIES OF DAILY LIVING (ADL)
ADLS_ACUITY_SCORE: 36
ADLS_ACUITY_SCORE: 28
ADLS_ACUITY_SCORE: 32
ADLS_ACUITY_SCORE: 36
ADLS_ACUITY_SCORE: 28
ADLS_ACUITY_SCORE: 36
ADLS_ACUITY_SCORE: 28
ADLS_ACUITY_SCORE: 28
ADLS_ACUITY_SCORE: 36
ADLS_ACUITY_SCORE: 36

## 2022-05-30 NOTE — PROGRESS NOTES
[facility]    Hospitalist Progress Note    Date of Service (when I saw the they are midlines probably he is going to now  2 or 3 more hours is not going to make effusion difference as long as we are able to hydrate him overnight patient): 05/30/2022  Provider:  Guillaume Rubalcava MD   Text Page  7am - 6PM       Assessment & Plan   Tere Turk is a 36 year old female who was admitted on 5/27/2022. I was asked to see the patient for managemen t of her clinically comorbidities after surgical intervention. She presents for an elective LS fusion after failing conservative treatment for her spinal DJD and chronic pain.  She underwent a multilevel spinal fusion involving L4/L5/S1 oblique lateral interbody fusion with discectomy.     1.  Chronic lower back pain and severe DJD.  Status post multilevel vertebral interbody fusion and discectomy.  POD # 2.  -Pain control, postsurgical cares, rehabilitation and DVT Prophylaxis as outlined by primary team.  2.  Essential hypertension.  -Continue her home meds same dose.  3.  DEREK.  -BiPAP with home settings.  May get help from RT if needed.  4.  GERD.  -PPI as prior to admission.  5.  PCOS.  -Metformin as prior to admission.  6.  WPW syndrome.  -Successfully treated with ablation in 2014.  7.  Klebsiella pneumoniae UTI.  -Leukocytosis with neutrophilia.  Positive urine culture.  Slightly elevated procalcitonin  -Rocephin IV  8. Acute Kidney Injury Assessment: Newly Diagnosed  Baseline Creatinine: 0.6 mg/dL  Creatinine increased to:  1.17 mg/dL  Most recent creatinine result:   Creatinine   Date Value Ref Range Status   05/30/2022 1.17 (H) 0.52 - 1.04 mg/dL Final     Cause:  Dehydration and Sepsis  Treatment: IV Fluids and Antibiotics     9.  Acute blood loss anemia postsurgery, suspect superimposed to chronic anemia.  -No need for transfusion, follow-up hemoglobin.  -Order iron studies.  Supplement as needed.     DVT Prophylaxis: Defer to primary  service  Code Status: Full Code    Disposition: Expected discharge today or in 24 hours depending on surgeon plan    Interval History   She denies lower urinary symptoms, urine culture positive for Klebsiella pneumonia, started on Rocephin today.  Acute kidney injury, suspect a combination of infection, low volume and preoperative medications.   Blood culture to be completed before antibiotics started.  She is having better pain control today.  Denies respiratory symptoms or gastrointestinal symptoms.  Chest x-ray is negative.  procalcitonin slightly elevated.  No other issues.     -Data reviewed today: I reviewed all new labs and imaging results over the last 24 hours.     Physical Exam   Temp: 98.3  F (36.8  C) Temp src: Temporal BP: 132/59 Pulse: 103   Resp: 18 SpO2: 99 % O2 Device: None (Room air)    Vitals:    05/13/22 1106 05/27/22 0620   Weight: 145.2 kg (320 lb) 143.4 kg (316 lb 3.2 oz)     Vital Signs with Ranges  Temp:  [98  F (36.7  C)-99  F (37.2  C)] 98.3  F (36.8  C)  Pulse:  [103-108] 103  Resp:  [18-20] 18  BP: ()/(42-59) 132/59  SpO2:  [94 %-99 %] 99 %  I/O last 3 completed shifts:  In: 480 [P.O.:480]  Out: 800 [Urine:800]    GEN:  Alert, oriented x 3, appears comfortable, NAD.  HEENT:  Normocephalic/atraumatic, no scleral icterus, no nasal discharge, mouth moist.  CV:  Regular rate and rhythm, no murmur or JVD.  S1 + S2 noted, no S3 or S4.  LUNGS:  Clear to auscultation bilaterally without rales/rhonchi/wheezing/retractions.  Symmetric chest rise on inhalation noted.  ABD:  Active bowel sounds, soft, non-tender/non-distended.  No rebound/guarding/rigidity.  EXT:  No edema or cyanosis.  No joint synovitis noted.  SKIN:  Dry to touch, no exanthems noted in the visualized areas.       Medications     sodium chloride 75 mL/hr at 05/27/22 1607       cefTRIAXone  2 g Intravenous Q24H     enoxaparin ANTICOAGULANT  40 mg Subcutaneous Q12H     gabapentin  600 mg Oral TID     hydrOXYzine  50 mg Oral  Q6H     lisinopril-hydrochlorothiazide  1 tablet Oral Daily     [Held by provider] metFORMIN  750 mg Oral BID w/meals     methocarbamol  1,000 mg Oral Q6H     pantoprazole  40 mg Oral Daily     polyethylene glycol  17 g Oral Daily     senna-docusate  1 tablet Oral BID     sodium chloride (PF)  3 mL Intracatheter Q8H       Data   Recent Labs   Lab 05/30/22  0602 05/29/22  0727 05/28/22  0648   WBC 21.6* 19.3* 16.6*   HGB 9.5* 9.8* 10.1*   MCV 78 77* 77*    346 371    135 135   POTASSIUM 3.7 3.8 3.9   CHLORIDE 103 105 106   CO2 27 29 27   BUN 23 16 11   CR 1.17* 0.74 0.63   ANIONGAP 6 1* 2*   SURENDRA 8.8 9.0 9.2   * 112* 118*       No results found for this or any previous visit (from the past 24 hour(s)).      Securely message with the Vocera Web Console (learn more here)  Text page via [facility] Paging/Directory        Disclaimer: This note consists of symbols derived from keyboarding, dictation and/or voice recognition software. As a result, there may be errors in the script that have gone undetected. Please consider this when interpreting information found in this chart.

## 2022-05-30 NOTE — PROGRESS NOTES
"  DAILY PROGRESS NOTE    Tere Turk is a 36 year old old female admitted on 5/27/2022  5:48 AM.    Subjective  Pain better controled      Objective    AAOx3, RILEY , f/c 4/4 FULL STRENGTH IN BLE AND NO SENSORY DEFICIT OR RADICULOPATHY WHAT SPO EVER   Ambulating short distances and overall  Appears more comfortable     Hemoglobin   Date Value Ref Range Status   05/30/2022 9.5 (L) 11.7 - 15.7 g/dL Final   ]  /59   Pulse 103   Temp 98.3  F (36.8  C) (Temporal)   Resp 18   Ht 1.651 m (5' 5\")   Wt 143.4 kg (316 lb 3.2 oz)   SpO2 99%   BMI 52.62 kg/m      WBC 21 and increasing but  No clinical sign of infection     CXR - does not show any significant sign and Cultures so far neg   UA some white cell     Impression / Plan     More PT /OT and TCU / Rehab ASAP   Recheck WBC and reinforce more ambulation and Incentive spirometer use     CHANGE DRESSING NOW DAILY WITH STERILE DRESSING ( BRUISES WILL BE NORMAL)    Lab/ if wbc still jackie , cxr and UA tomorrow   If more WBC in UA  We should concider treating UTI ( Bactrim at discretion of hospitalist)   Patient not ambulating, so I am starting prophylactic Lovenox and Hospitalist to adjust it       "

## 2022-05-30 NOTE — PLAN OF CARE
Goal Outcome Evaluation:      Patient vital signs are at baseline: No,  Reason:  Tachycardic.  Patient able to ambulate as they were prior to admission or with assist devices provided by therapies during their stay:  No,  Reason:  Has been declining PT d/t pain  Patient MUST void prior to discharge:  Yes  Patient able to tolerate oral intake:  Yes  Pain has adequate pain control using Oral analgesics:  No,  Reason:  Constantly rates pain as severe. Called surgeon for more meds.  Does patient have an identified :  Yes  Has goal D/C date and time been discussed with patient:  Yes    Patient aox4. Assist of 2 with roxana steady to transfer to toilet. Patient was able to sit up without assistance. Patient unable to  roxana steady for more than 10 seconds.     Dressing reinforced. 3 ice packs in place. Plan to discharge to TCU.

## 2022-05-30 NOTE — PLAN OF CARE
Ax2 with Mari Santos. A&Ox4.  VSS. 02 - 94% on 2L via Bipap. LS - clear.  CMS intact. Dressing CD&I.  Pain controlled at a 7/10 this shift with schedule medications every 2 hours. Slept well between med passes.   IV - SL.  Brace when OOB.

## 2022-05-30 NOTE — PLAN OF CARE
"Goal Outcome Evaluation:        Patient rates pain 5-8 in low back/RLE. Oxycodone 15 mg prn for pain, also scheduled meds (see MAR). Pain radiates into buttocks (R>L). Lower extremity CMS intact. Pt stands with assist of 2 and roxana steady; complained of Left knee \"buckling\" this am, improved this afternoon. Encouraged activity and out of bed. Pt up in chair this am for approximately 20 minutes this am, then up for chair for dinner in pm. WBC increased to 21.6 today. Pt started on Rocephin for UTI; cultures drawn prior to starting ABX. Creatinine elevated; started IV fluids x 24 hours; hold hydrochlorothiazide.  Pt tolerating diet. Encouraged IS use and ankle pumps. New orders for Lovenox per Dr. Wilson. Dressing changed; steri strip sites x5 CDI. Right buttock has an area with suspected pressure injury? WOC consulted. Encouraged pt to lay OFF of that side.  Back brace on when out of bed. Plan TCU vs ARU at discharge. Pt's mother at bedside.               "

## 2022-05-31 ENCOUNTER — APPOINTMENT (OUTPATIENT)
Dept: PHYSICAL THERAPY | Facility: CLINIC | Age: 37
DRG: 453 | End: 2022-05-31
Attending: NEUROLOGICAL SURGERY
Payer: COMMERCIAL

## 2022-05-31 ENCOUNTER — APPOINTMENT (OUTPATIENT)
Dept: OCCUPATIONAL THERAPY | Facility: CLINIC | Age: 37
DRG: 453 | End: 2022-05-31
Attending: NEUROLOGICAL SURGERY
Payer: COMMERCIAL

## 2022-05-31 LAB
ANION GAP SERPL CALCULATED.3IONS-SCNC: 5 MMOL/L (ref 3–14)
BACTERIA UR CULT: ABNORMAL
BACTERIA UR CULT: ABNORMAL
BASOPHILS # BLD AUTO: 0.1 10E3/UL (ref 0–0.2)
BASOPHILS NFR BLD AUTO: 0 %
BUN SERPL-MCNC: 17 MG/DL (ref 7–30)
CALCIUM SERPL-MCNC: 8.3 MG/DL (ref 8.5–10.1)
CHLORIDE BLD-SCNC: 106 MMOL/L (ref 94–109)
CO2 SERPL-SCNC: 24 MMOL/L (ref 20–32)
CREAT SERPL-MCNC: 1.06 MG/DL (ref 0.52–1.04)
EOSINOPHIL # BLD AUTO: 0.6 10E3/UL (ref 0–0.7)
EOSINOPHIL NFR BLD AUTO: 3 %
ERYTHROCYTE [DISTWIDTH] IN BLOOD BY AUTOMATED COUNT: 15.4 % (ref 10–15)
GFR SERPL CREATININE-BSD FRML MDRD: 70 ML/MIN/1.73M2
GLUCOSE BLD-MCNC: 104 MG/DL (ref 70–99)
HCT VFR BLD AUTO: 31.2 % (ref 35–47)
HGB BLD-MCNC: 9.1 G/DL (ref 11.7–15.7)
IMM GRANULOCYTES # BLD: 0.2 10E3/UL
IMM GRANULOCYTES NFR BLD: 1 %
IRON SATN MFR SERPL: 6 % (ref 15–46)
IRON SERPL-MCNC: 17 UG/DL (ref 35–180)
LYMPHOCYTES # BLD AUTO: 3.8 10E3/UL (ref 0.8–5.3)
LYMPHOCYTES NFR BLD AUTO: 18 %
MCH RBC QN AUTO: 22.7 PG (ref 26.5–33)
MCHC RBC AUTO-ENTMCNC: 29.2 G/DL (ref 31.5–36.5)
MCV RBC AUTO: 78 FL (ref 78–100)
MONOCYTES # BLD AUTO: 1.7 10E3/UL (ref 0–1.3)
MONOCYTES NFR BLD AUTO: 8 %
NEUTROPHILS # BLD AUTO: 15.2 10E3/UL (ref 1.6–8.3)
NEUTROPHILS NFR BLD AUTO: 70 %
NRBC # BLD AUTO: 0 10E3/UL
NRBC BLD AUTO-RTO: 0 /100
PLATELET # BLD AUTO: 373 10E3/UL (ref 150–450)
POTASSIUM BLD-SCNC: 3.7 MMOL/L (ref 3.4–5.3)
PROCALCITONIN SERPL-MCNC: 0.23 NG/ML
RBC # BLD AUTO: 4.01 10E6/UL (ref 3.8–5.2)
SODIUM SERPL-SCNC: 135 MMOL/L (ref 133–144)
TIBC SERPL-MCNC: 302 UG/DL (ref 240–430)
TRANSFERRIN SERPL-MCNC: 231 MG/DL (ref 210–360)
WBC # BLD AUTO: 21.5 10E3/UL (ref 4–11)

## 2022-05-31 PROCEDURE — 250N000013 HC RX MED GY IP 250 OP 250 PS 637: Performed by: NEUROLOGICAL SURGERY

## 2022-05-31 PROCEDURE — 250N000013 HC RX MED GY IP 250 OP 250 PS 637: Performed by: PHYSICIAN ASSISTANT

## 2022-05-31 PROCEDURE — 36415 COLL VENOUS BLD VENIPUNCTURE: CPT | Performed by: HOSPITALIST

## 2022-05-31 PROCEDURE — 250N000011 HC RX IP 250 OP 636: Performed by: HOSPITALIST

## 2022-05-31 PROCEDURE — 97535 SELF CARE MNGMENT TRAINING: CPT | Mod: GO

## 2022-05-31 PROCEDURE — 99233 SBSQ HOSP IP/OBS HIGH 50: CPT | Performed by: NURSE PRACTITIONER

## 2022-05-31 PROCEDURE — 84466 ASSAY OF TRANSFERRIN: CPT | Performed by: HOSPITALIST

## 2022-05-31 PROCEDURE — 82310 ASSAY OF CALCIUM: CPT | Performed by: HOSPITALIST

## 2022-05-31 PROCEDURE — G0463 HOSPITAL OUTPT CLINIC VISIT: HCPCS

## 2022-05-31 PROCEDURE — 85025 COMPLETE CBC W/AUTO DIFF WBC: CPT | Performed by: HOSPITALIST

## 2022-05-31 PROCEDURE — 258N000003 HC RX IP 258 OP 636: Performed by: HOSPITALIST

## 2022-05-31 PROCEDURE — 99233 SBSQ HOSP IP/OBS HIGH 50: CPT | Performed by: HOSPITALIST

## 2022-05-31 PROCEDURE — 250N000011 HC RX IP 250 OP 636: Performed by: NEUROLOGICAL SURGERY

## 2022-05-31 PROCEDURE — 97530 THERAPEUTIC ACTIVITIES: CPT | Mod: GP

## 2022-05-31 PROCEDURE — 84145 PROCALCITONIN (PCT): CPT | Performed by: HOSPITALIST

## 2022-05-31 PROCEDURE — 250N000013 HC RX MED GY IP 250 OP 250 PS 637: Performed by: NURSE PRACTITIONER

## 2022-05-31 PROCEDURE — 120N000001 HC R&B MED SURG/OB

## 2022-05-31 PROCEDURE — 83550 IRON BINDING TEST: CPT | Performed by: HOSPITALIST

## 2022-05-31 RX ORDER — OXYCODONE HYDROCHLORIDE 5 MG/1
15 TABLET ORAL
Status: DISCONTINUED | OUTPATIENT
Start: 2022-05-31 | End: 2022-06-01 | Stop reason: HOSPADM

## 2022-05-31 RX ORDER — OXYCODONE HYDROCHLORIDE 5 MG/1
10 TABLET ORAL
Status: DISCONTINUED | OUTPATIENT
Start: 2022-05-31 | End: 2022-06-01 | Stop reason: HOSPADM

## 2022-05-31 RX ORDER — SODIUM CHLORIDE 9 MG/ML
INJECTION, SOLUTION INTRAVENOUS CONTINUOUS
Status: ACTIVE | OUTPATIENT
Start: 2022-05-31 | End: 2022-06-01

## 2022-05-31 RX ORDER — ACETAMINOPHEN 325 MG/1
975 TABLET ORAL EVERY 8 HOURS
Status: DISCONTINUED | OUTPATIENT
Start: 2022-05-31 | End: 2022-06-01 | Stop reason: HOSPADM

## 2022-05-31 RX ADMIN — METHOCARBAMOL 1000 MG: 500 TABLET ORAL at 02:41

## 2022-05-31 RX ADMIN — SODIUM CHLORIDE: 9 INJECTION, SOLUTION INTRAVENOUS at 03:26

## 2022-05-31 RX ADMIN — OXYCODONE HYDROCHLORIDE 15 MG: 5 TABLET ORAL at 03:41

## 2022-05-31 RX ADMIN — ACETAMINOPHEN 650 MG: 325 TABLET, FILM COATED ORAL at 00:36

## 2022-05-31 RX ADMIN — METHOCARBAMOL 1000 MG: 500 TABLET ORAL at 20:36

## 2022-05-31 RX ADMIN — ENOXAPARIN SODIUM 40 MG: 40 INJECTION SUBCUTANEOUS at 21:56

## 2022-05-31 RX ADMIN — METHOCARBAMOL 1000 MG: 500 TABLET ORAL at 14:21

## 2022-05-31 RX ADMIN — ACETAMINOPHEN 975 MG: 325 TABLET, FILM COATED ORAL at 15:49

## 2022-05-31 RX ADMIN — ACETAMINOPHEN 650 MG: 325 TABLET, FILM COATED ORAL at 19:03

## 2022-05-31 RX ADMIN — GABAPENTIN 700 MG: 400 CAPSULE ORAL at 20:36

## 2022-05-31 RX ADMIN — SODIUM CHLORIDE: 9 INJECTION, SOLUTION INTRAVENOUS at 11:14

## 2022-05-31 RX ADMIN — HYDROXYZINE HYDROCHLORIDE 50 MG: 50 TABLET, FILM COATED ORAL at 03:41

## 2022-05-31 RX ADMIN — OXYCODONE HYDROCHLORIDE 15 MG: 5 TABLET ORAL at 21:56

## 2022-05-31 RX ADMIN — METHOCARBAMOL 1000 MG: 500 TABLET ORAL at 08:12

## 2022-05-31 RX ADMIN — ACETAMINOPHEN 650 MG: 325 TABLET, FILM COATED ORAL at 08:12

## 2022-05-31 RX ADMIN — GABAPENTIN 600 MG: 300 CAPSULE ORAL at 08:11

## 2022-05-31 RX ADMIN — OXYCODONE HYDROCHLORIDE 15 MG: 5 TABLET ORAL at 14:21

## 2022-05-31 RX ADMIN — ENOXAPARIN SODIUM 40 MG: 40 INJECTION SUBCUTANEOUS at 10:27

## 2022-05-31 RX ADMIN — OXYCODONE HYDROCHLORIDE 15 MG: 5 TABLET ORAL at 11:03

## 2022-05-31 RX ADMIN — HYDROXYZINE HYDROCHLORIDE 75 MG: 50 TABLET, FILM COATED ORAL at 15:50

## 2022-05-31 RX ADMIN — CEFTRIAXONE 2 G: 2 INJECTION, POWDER, FOR SOLUTION INTRAMUSCULAR; INTRAVENOUS at 14:19

## 2022-05-31 RX ADMIN — PANTOPRAZOLE SODIUM 40 MG: 40 TABLET, DELAYED RELEASE ORAL at 08:12

## 2022-05-31 RX ADMIN — GABAPENTIN 700 MG: 400 CAPSULE ORAL at 14:22

## 2022-05-31 RX ADMIN — HYDROXYZINE HYDROCHLORIDE 50 MG: 50 TABLET, FILM COATED ORAL at 10:27

## 2022-05-31 RX ADMIN — OXYCODONE HYDROCHLORIDE 10 MG: 5 TABLET ORAL at 17:48

## 2022-05-31 RX ADMIN — OXYCODONE HYDROCHLORIDE 15 MG: 5 TABLET ORAL at 08:12

## 2022-05-31 RX ADMIN — OXYCODONE HYDROCHLORIDE 15 MG: 5 TABLET ORAL at 00:36

## 2022-05-31 RX ADMIN — HYDROXYZINE HYDROCHLORIDE 75 MG: 50 TABLET, FILM COATED ORAL at 21:57

## 2022-05-31 ASSESSMENT — ACTIVITIES OF DAILY LIVING (ADL)
ADLS_ACUITY_SCORE: 25
ADLS_ACUITY_SCORE: 25
ADLS_ACUITY_SCORE: 32
ADLS_ACUITY_SCORE: 25
ADLS_ACUITY_SCORE: 32
ADLS_ACUITY_SCORE: 32
ADLS_ACUITY_SCORE: 36
ADLS_ACUITY_SCORE: 25
ADLS_ACUITY_SCORE: 32
ADLS_ACUITY_SCORE: 25

## 2022-05-31 NOTE — PROGRESS NOTES
This writer came in to assist to reposition pt as she requested ice packs to her bottom. Upon repositioning, both this writer and the assigned RN noticed the reddened areas that were on both buttocks. Encouraged pt to try and stay on her side d/t these reddened areas as they were a concern for pressure injuries. Pt did attempt to lay on side with ice packs applied and pillows to support back, however, pt could not tolerate it. Discussed the importance of shifting weight and side lying d/t these newly documented areas. WOCN is consulted. Pt stated that her pain was too severe and to be on her side and insisted she would stay on her back. Will readdress with pt upon next reposition.

## 2022-05-31 NOTE — CONSULTS
[facility] Nurse Inpatient Assessment     Today's Assessment: Right lateral buttocks    Patient History (according to provider note(s):      Tere Turk is a 36 year old female who was admitted on 5/27/2022. I was asked to see the patient for managemen t of her clinically comorbidities after surgical intervention. She presents for an elective LS fusion after failing conservative treatment for her spinal DJD and chronic pain.  She underwent a multilevel spinal fusion involving L4/L5/S1 oblique lateral interbody fusion with discectomy.    Areas Assessed:      Areas visualized during today's visit: Focused:    Wound location: Right lateral buttocks    Last photo: 5/31/22    Wound due to: Unknown Etiology-possible pressure component but is not on a bony prominence and patient doesn't recall anything under her  Wound history/plan of care: Previously had larger outlined areas of erythema on bilateral buttocks but these are resolving and blanchable.  Wound base: 3 areas of purple discoloration measuring 1x0.5cm, 2.5x0.5cm and 2x1.5cm.  2 serous blisters measuring 0.8x0.5cm and 0.5x0.3cm     Palpation of the wound bed: normal      Drainage: none     Description of drainage: none     Measurements (length x width x depth, in cm): see above     Tunneling: N/A     Undermining: N/A  Periwound skin: Intact      Color: pink      Temperature: normal   Odor: none  Pain: denies  pain at buttocks, significant pain in back  Treatment goal: Heal   STATUS: initial assessment  Supplies ordered: None currently needed       Treatment Plan:     Right buttock wound(s): Leave open to air unless areas start draining     Pressure Injury Prevention (PIP) Plan:      Moisture management: Avoid brief in bed      Mattress: Follow bed algorithm, reassess daily and order specialty mattress, if indicated.      HOB: Maintain at or below 30 degrees, unless contraindicated      Repositioning in bed: Every 1-2 hours and  "encourage sidelying      Heels: Pillows under calves      If patient is declining pressure injury prevention interventions: Explore reason why and address patient's concerns, Educate on pressure injury risk and prevention intervention(s) and If patient is still declining, document \"informed refusal\"     Orders: Written    RECOMMEND PRIMARY TEAM ORDER: None, at this time  Education provided: importance of repositioning, plan of care and Off-loading pressure  Discussed plan of care with: Patient and Nurse  WOC nurse follow-up plan: 1-2 times a week  Notify WOC if wound(s) deteriorate.  Nursing to notify the Provider(s) and re-consult the WOC Nurse if new skin concern.    DATA:     Current support surface: Standard  Atmos Air mattress  BMI: Body mass index is 52.62 kg/m .   Active diet order: Orders Placed This Encounter      Advance Diet as Tolerated: Regular Diet Adult      Discharge Instruction - Regular Diet Adult      Diet     Output: I/O last 3 completed shifts:  In: 345.83 [I.V.:345.83]  Out: -      Labs: Recent Labs   Lab 05/31/22  0547   HGB 9.1*   WBC 21.5*     Pressure injury risk assessment:   Sensory Perception: 4-->no impairment  Moisture: 4-->rarely moist  Activity: 2-->chairfast  Mobility: 2-->very limited  Nutrition: 3-->adequate  Friction and Shear: 3-->no apparent problem  Diomedes Score: 18    Fam Lao RN CWOCN  Dept. Pager: [phone]  Dept. Office Number: [phone]      "

## 2022-05-31 NOTE — PLAN OF CARE
Ax2 with Mari Steady. A&Ox4.  VSS. Tachycardic. 02 - 100% on CPAP with 2L 02.  LS - clear, diminished at bases.   Dressing CD&I. Held Senna as Pt had loose stools earlier in the day.   Continuous IVF fluids interrupted for 2 hours as IV started leaking. New IV inserted. New finish time is 1700.  PRN medications given for pain.   Redness on buttocks had grown slightly lighter since last repositioning. Outlined the blanchable redness. 4 purple bruise like areas on R buttock. Pt more responsive to repositioning, and agreed to have PCD's on. Reinforced education on importance of repositioning and foot pumps. Pt requested a new mattress.

## 2022-05-31 NOTE — PROGRESS NOTES
Care Management Discharge Note    Discharge Date: 05/31/2022     Discharge Disposition: Acute Rehab    Discharge Services: OT, PT    Discharge DME:  N/A    Discharge Transportation: family or friend will provide    Private pay costs discussed: Not applicable    PAS Confirmation Code:  N/A    Education Provided on the Discharge Plan:  Yes  Persons Notified of Discharge Plans: Patient, RN, CM  Patient/Family in Agreement with the Plan: yes    Handoff Referral Completed: No    Additional Information:  CM spoke with Tony Sung for ARU. Patient has [employer] insurance. [employer] takes longer to obtain prior auth. Tony requested ride be scheduled for Wednesday, 6/1/22 at 2pm. CM contacted [facility] ([phone]). Scheduled wheelchair ride for 1400 (2pm). Updated bedside RN, chg RN & SW.     RN CC will continue to follow for discharge planning.    Carrie Jimenez RN, BSN, CPHN, CM  Inpatient Care Coordination - [facility]  [phone]    Addendum 3:28pm - CM received call from Tony Sung for ARU. They have received ins auth and can take the patient tomorrow morning instead of tomorrow afternoon. Contacted [facility] ([phone]) spoke with Jimena. Rescheduled transport to 11am tomorrow. Updated Tony & patient at bedside. She is agreeable to arrangements.  bing

## 2022-05-31 NOTE — PROGRESS NOTES
[facility]    Hospitalist Progress Note    Date of Service (when I saw the they are midlines probably he is going to now  2 or 3 more hours is not going to make effusion difference as long as we are able to hydrate him overnight patient): 05/31/2022  Provider:  Guillaume Rubalcava MD   Text Page  7am - 6PM       Assessment & Plan   Tere Turk is a 36 year old female who was admitted on 5/27/2022. I was asked to see the patient for managemen t of her clinically comorbidities after surgical intervention. She presents for an elective LS fusion after failing conservative treatment for her spinal DJD and chronic pain.  She underwent a multilevel spinal fusion involving L4/L5/S1 oblique lateral interbody fusion with discectomy.     1.  Chronic lower back pain and severe DJD.  Status post multilevel vertebral interbody fusion and discectomy.  POD # 2.  -Pain control, postsurgical cares, rehabilitation and DVT Prophylaxis as outlined by primary team.  2.  Essential hypertension.  -Continue her home meds same dose.  3.  DEREK.  -BiPAP with home settings.  May get help from RT if needed.  4.  GERD.  -PPI as prior to admission.  5.  PCOS.  -Metformin as prior to admission.  6.  WPW syndrome.  -Successfully treated with ablation in 2014.  7.  Klebsiella pneumoniae UTI.  -Leukocytosis with neutrophilia.  Positive urine culture.  Slightly elevated procalcitonin  -Rocephin IV  8. Acute Kidney Injury Assessment: Improving  Baseline Creatinine: 0.6 mg/dL  Creatinine increased to:  1.17 mg/dL  Most recent creatinine result:   Creatinine   Date Value Ref Range Status   05/31/2022 1.06 (H) 0.52 - 1.04 mg/dL Final     Cause:  Dehydration and Sepsis  Treatment: IV Fluids and Antibiotics     9.  Acute blood loss anemia postsurgery, suspect superimposed to chronic anemia.  -No need for transfusion, follow-up hemoglobin.  -Order iron studies.  Supplement as needed.     DVT Prophylaxis: Defer to primary service  Code  Status: Full Code    Disposition: Expected discharge to ARU tomorrow if white count is trending down.  There is a bed available,  is on board.     Interval History   Better, no lower urinary symptoms, afebrile.  Acute kidney injury, is improving, will continue with fluid IV.  Blood culture in process.  She is having better pain control today.      -Data reviewed today: I reviewed all new labs and imaging results over the last 24 hours.     Physical Exam   Temp: 98.5  F (36.9  C) Temp src: Temporal BP: 112/64 Pulse: 90   Resp: 18 SpO2: 100 % O2 Device: None (Room air)    Vitals:    05/13/22 1106 05/27/22 0620   Weight: 145.2 kg (320 lb) 143.4 kg (316 lb 3.2 oz)     Vital Signs with Ranges  Temp:  [98.5  F (36.9  C)-98.6  F (37  C)] 98.5  F (36.9  C)  Pulse:  [] 90  Resp:  [18] 18  BP: (112-140)/(48-64) 112/64  SpO2:  [94 %-100 %] 100 %  I/O last 3 completed shifts:  In: -   Out: 800 [Urine:800]    GEN:  Alert, oriented x 3, appears comfortable, NAD.  HEENT:  Normocephalic/atraumatic, no scleral icterus, no nasal discharge, mouth moist.  CV:  Regular rate and rhythm, no murmur or JVD.  S1 + S2 noted, no S3 or S4.  LUNGS:  Clear to auscultation bilaterally without rales/rhonchi/wheezing/retractions.  Symmetric chest rise on inhalation noted.  ABD:  Active bowel sounds, soft, non-tender/non-distended.  No rebound/guarding/rigidity.  EXT:  No edema or cyanosis.  No joint synovitis noted.  SKIN:  Dry to touch, no exanthems noted in the visualized areas.       Medications     sodium chloride 125 mL/hr at 05/31/22 1114       acetaminophen  975 mg Oral Q8H     cefTRIAXone  2 g Intravenous Q24H     enoxaparin ANTICOAGULANT  40 mg Subcutaneous Q12H     [START ON 6/1/2022] ferrous fumarate-vitamin C ER  1 tablet Oral Daily with breakfast     gabapentin  700 mg Oral TID     hydrOXYzine  75 mg Oral Q6H     [Held by provider] lisinopril-hydrochlorothiazide  1 tablet Oral Daily     [Held by provider] metFORMIN   750 mg Oral BID w/meals     methocarbamol  1,000 mg Oral Q6H     pantoprazole  40 mg Oral Daily     polyethylene glycol  17 g Oral Daily     senna-docusate  1 tablet Oral BID     sodium chloride (PF)  3 mL Intracatheter Q8H       Data   Recent Labs   Lab 05/31/22  0547 05/30/22  0602 05/29/22  0727   WBC 21.5* 21.6* 19.3*   HGB 9.1* 9.5* 9.8*   MCV 78 78 77*    377 346    136 135   POTASSIUM 3.7 3.7 3.8   CHLORIDE 106 103 105   CO2 24 27 29   BUN 17 23 16   CR 1.06* 1.17* 0.74   ANIONGAP 5 6 1*   SURENDRA 8.3* 8.8 9.0   * 123* 112*       No results found for this or any previous visit (from the past 24 hour(s)).      Securely message with the Vocera Web Console (learn more here)  Text page via [facility] Paging/Directory        Disclaimer: This note consists of symbols derived from keyboarding, dictation and/or voice recognition software. As a result, there may be errors in the script that have gone undetected. Please consider this when interpreting information found in this chart.

## 2022-05-31 NOTE — PROGRESS NOTES
"    [facility]  Pain Management Progress Note  Text Page     Assessment & Plan   Tere Turk is a 36 year old female who was admitted on 5/27/2022.     PLAN:   1)  Opioids:  - Oxycodone 10-15 mg every 4 hours prn moderate to severe pain - patient notes \"sleepiness\" with 15 mg dose, recommended that nursing start with 10 mg for moderate to severe pain.  - hydromorphone 0.2 - 0.4 mg every 2 hours severe, breakthrough pain     Opioids Treatment Goal:   -Improvement in function  -Participate in PT  -Post-operative management of pain, expected 3-7 days needed     2)Non-opioid multimodal medication therapy  -Topical: Voltaren 1% Topical Gel four times daily, Lidocaine Patch 4% place daily in evening.  -N-SAIDS: NO NSAIDS  -Muscle Relaxants: Methocarbamol 750 mg every 6 hours scheduled  -Adjuvants: Acetaminophen 975 mg every 8 hours, Gabapentin 700 mg three times daily, Hydroxyzine 75 mg every 8 hours, consider steroid course - will defer to surgical team.  Nursing to contact surgical team for recommendations.  -Antidepresants/anxiolytics: Hydroxyzine 75 mg every 8 hours     3)  Non-medication interventions  Positioning, ICE, Relaxation, Physical therapy,   - encourage mindfulness, processing anxiety of acute situation.     4)  Constipation Prophylaxis  - senna-docusate 1 tablet 2 times daily  - miralax 1 packet daily     5) Medication Risk reduction strategies   -monitor for sedation   -Capnography per protocol   -narcan for opioid reversal      6)  Pain Education  -Opioid safe use, storage and disposal information included in DC AVS     7)  DC Planning   Discussed goal of Opioid therapy as above with patient and nursing team  Length of therapy is less than 10 days, opioids may be stopped without taper.  Continued outpatient management of pain per surgical team  Disposition: home  Support systems: mother  The following risk factors have been identified for unintentional overdose: patient " "is overweight and patient has sleep disordered breathing. Discharge with intra-nasal naloxone if discharged to home with opioids  >40 mg MME/day.  Plan for education prior to discharge.     ASSESSMENT  1)  Acute low back pain s/p L4 - S1 lumbar decompression and fusion     2)  Patient with chronic back and joint pain, NOT on chronic opioid therapy  Baseline 0 mg Daily Morphine Equivalent as dispensed and as reported daily use.  Patient has no expected opioid tolerance.      Patient's opioid use in perioperative settin mg oxycodone PO= 135 mg Daily Morphine Equivalent     3)  Risk factors for opioid related harms  -Sleep disordered breathing     4)  Opioid induced side-effects:  -Constipation  - history of constipation, monitor for need to escalate regimen  -Nausea/Vomit - no history  -Sedation - history of \"dizzy and sleepy\" feelings with opiates.   -Urinary Retention - no history       TATUM Crenshaw CNP  Pain Management and Palliative Care  [facility]  Pgr: [phone]    Time Spent on this Encounter   Total unit/floor time 35 minutes, time consisted of the following, examination of the patient, reviewing the record and completing documentation. >50% of time spent in counseling and coordination of care.  Time spend counseling with patient and family consisted of the following topics, care planning for discharge and symptom management.  Time spent in coordination of care with Bedside Nurse Tamar and Hospitalist Dr. Rubalcava.       Interval History   Patient reports inability to get up with therapy over the weekend due to pain.  Based on chart notes, difficult to determine if patient's pain was primary factor vs motivation and/or anxiety as primary factor to mobilize.  Education provided today regarding presence of some pain despite pain medications and that physical therapy and movement will aide in her recovery and pain management.  Reviewed adjuvant pain management " therapies, non-pharmacological therapies and made changes in EMR to reflect recommendations.  She and her mother verbalized understanding of the pain management plan.  Denied questions.    Review of Systems    CONSTITUTIONAL: NEGATIVE for fever, chills, change in weight  ENT/MOUTH: NEGATIVE for ear, mouth and throat problems  RESP: NEGATIVE for significant cough or SOB  CV: NEGATIVE for chest pain, palpitations or peripheral edema    Physical Exam   Temp:  [98.5  F (36.9  C)-98.6  F (37  C)] 98.5  F (36.9  C)  Pulse:  [] 90  Resp:  [18] 18  BP: (112-140)/(48-64) 112/64  SpO2:  [94 %-100 %] 100 %  316 lbs 3.2 oz  GEN:  Alert, oriented x 3, lying in bed, No apparent distress.  HEENT:  Normocephalic/atraumatic, no scleral icterus, no nasal discharge, mouth moist.  CV:  RRR, S1, S2; no murmurs or other irregularities noted.  +3 DP/PT pulses bilaterally; trace bilateral lower extremeties.  RESP:  Clear to auscultation bilaterally without rales/rhonchi/wheezing/retractions.  Symmetric chest rise on inhalation noted.  Normal respiratory effort.  ABD:  Rounded, soft, non-tender/non-distended.  +BS  EXT:  Edema & pulses as noted above.  Color, moisture and sensation intact x 4.     SKIN:  Dry to touch, no exanthems noted in the visualized areas.    NEURO: pain limited exam, moves extremities spontaneously against gravity.  NO focal deficits  PAIN BEHAVIOR: Cooperative  Psych:  Normal affect.  Calm, cooperative, conversant appropriately.        Medications     sodium chloride 125 mL/hr at 05/31/22 1400       acetaminophen  975 mg Oral Q8H     cefTRIAXone  2 g Intravenous Q24H     enoxaparin ANTICOAGULANT  40 mg Subcutaneous Q12H     [START ON 6/1/2022] ferrous fumarate-vitamin C ER  1 tablet Oral Daily with breakfast     gabapentin  700 mg Oral TID     hydrOXYzine  75 mg Oral Q6H     [Held by provider] lisinopril-hydrochlorothiazide  1 tablet Oral Daily     [Held by provider] metFORMIN  750 mg Oral BID w/meals      methocarbamol  1,000 mg Oral Q6H     pantoprazole  40 mg Oral Daily     polyethylene glycol  17 g Oral Daily     senna-docusate  1 tablet Oral BID     sodium chloride (PF)  3 mL Intracatheter Q8H       Data   Results for orders placed or performed during the hospital encounter of 05/27/22 (from the past 24 hour(s))   CBC with Platelets & Differential    Narrative    The following orders were created for panel order CBC with Platelets & Differential.  Procedure                               Abnormality         Status                     ---------                               -----------         ------                     CBC with platelets and d...[[id number]]  Abnormal            Final result                 Please view results for these tests on the individual orders.   Basic metabolic panel   Result Value Ref Range    Sodium 135 133 - 144 mmol/L    Potassium 3.7 3.4 - 5.3 mmol/L    Chloride 106 94 - 109 mmol/L    Carbon Dioxide (CO2) 24 20 - 32 mmol/L    Anion Gap 5 3 - 14 mmol/L    Urea Nitrogen 17 7 - 30 mg/dL    Creatinine 1.06 (H) 0.52 - 1.04 mg/dL    Calcium 8.3 (L) 8.5 - 10.1 mg/dL    Glucose 104 (H) 70 - 99 mg/dL    GFR Estimate 70 >60 mL/min/1.73m2   Procalcitonin   Result Value Ref Range    Procalcitonin 0.23 (H) <0.05 ng/mL   Iron and iron binding capacity   Result Value Ref Range    Iron 17 (L) 35 - 180 ug/dL    Iron Binding Capacity 302 240 - 430 ug/dL    Iron Sat Index 6 (L) 15 - 46 %   Transferrin   Result Value Ref Range    Transferrin 231 210 - 360 mg/dL   CBC with platelets and differential   Result Value Ref Range    WBC Count 21.5 (H) 4.0 - 11.0 10e3/uL    RBC Count 4.01 3.80 - 5.20 10e6/uL    Hemoglobin 9.1 (L) 11.7 - 15.7 g/dL    Hematocrit 31.2 (L) 35.0 - 47.0 %    MCV 78 78 - 100 fL    MCH 22.7 (L) 26.5 - 33.0 pg    MCHC 29.2 (L) 31.5 - 36.5 g/dL    RDW 15.4 (H) 10.0 - 15.0 %    Platelet Count 373 150 - 450 10e3/uL    % Neutrophils 70 %    % Lymphocytes 18 %    % Monocytes 8 %    %  Eosinophils 3 %    % Basophils 0 %    % Immature Granulocytes 1 %    NRBCs per 100 WBC 0 <1 /100    Absolute Neutrophils 15.2 (H) 1.6 - 8.3 10e3/uL    Absolute Lymphocytes 3.8 0.8 - 5.3 10e3/uL    Absolute Monocytes 1.7 (H) 0.0 - 1.3 10e3/uL    Absolute Eosinophils 0.6 0.0 - 0.7 10e3/uL    Absolute Basophils 0.1 0.0 - 0.2 10e3/uL    Absolute Immature Granulocytes 0.2 <=0.4 10e3/uL    Absolute NRBCs 0.0 10e3/uL

## 2022-06-01 ENCOUNTER — APPOINTMENT (OUTPATIENT)
Dept: PHYSICAL THERAPY | Facility: CLINIC | Age: 37
DRG: 453 | End: 2022-06-01
Attending: NEUROLOGICAL SURGERY
Payer: COMMERCIAL

## 2022-06-01 ENCOUNTER — APPOINTMENT (OUTPATIENT)
Dept: ULTRASOUND IMAGING | Facility: CLINIC | Age: 37
DRG: 453 | End: 2022-06-01
Attending: NEUROLOGICAL SURGERY
Payer: COMMERCIAL

## 2022-06-01 ENCOUNTER — HOSPITAL ENCOUNTER (INPATIENT)
Facility: CLINIC | Age: 37
LOS: 9 days | Discharge: HOME HEALTH | DRG: 560 | End: 2022-06-10
Attending: PHYSICAL MEDICINE & REHABILITATION | Admitting: PHYSICAL MEDICINE & REHABILITATION
Payer: COMMERCIAL

## 2022-06-01 VITALS
OXYGEN SATURATION: 97 % | RESPIRATION RATE: 18 BRPM | WEIGHT: 293 LBS | HEIGHT: 65 IN | SYSTOLIC BLOOD PRESSURE: 174 MMHG | TEMPERATURE: 98.6 F | HEART RATE: 106 BPM | BODY MASS INDEX: 48.82 KG/M2 | DIASTOLIC BLOOD PRESSURE: 86 MMHG

## 2022-06-01 DIAGNOSIS — G47.33 OSA (OBSTRUCTIVE SLEEP APNEA): ICD-10-CM

## 2022-06-01 DIAGNOSIS — Z98.1 S/P SPINAL FUSION: Primary | ICD-10-CM

## 2022-06-01 DIAGNOSIS — M43.27 FUSION OF SPINE, LUMBOSACRAL REGION: ICD-10-CM

## 2022-06-01 DIAGNOSIS — L23.1 ALLERGIC CONTACT DERMATITIS DUE TO ADHESIVES: ICD-10-CM

## 2022-06-01 DIAGNOSIS — K21.00 GASTROESOPHAGEAL REFLUX DISEASE WITH ESOPHAGITIS, UNSPECIFIED WHETHER HEMORRHAGE: ICD-10-CM

## 2022-06-01 DIAGNOSIS — I10 BENIGN ESSENTIAL HYPERTENSION: ICD-10-CM

## 2022-06-01 LAB
ANION GAP SERPL CALCULATED.3IONS-SCNC: 4 MMOL/L (ref 3–14)
BASOPHILS # BLD AUTO: 0 10E3/UL (ref 0–0.2)
BASOPHILS NFR BLD AUTO: 0 %
BUN SERPL-MCNC: 11 MG/DL (ref 7–30)
CALCIUM SERPL-MCNC: 8.6 MG/DL (ref 8.5–10.1)
CHLORIDE BLD-SCNC: 107 MMOL/L (ref 94–109)
CO2 SERPL-SCNC: 27 MMOL/L (ref 20–32)
CREAT SERPL-MCNC: 0.62 MG/DL (ref 0.52–1.04)
EOSINOPHIL # BLD AUTO: 0.5 10E3/UL (ref 0–0.7)
EOSINOPHIL NFR BLD AUTO: 4 %
ERYTHROCYTE [DISTWIDTH] IN BLOOD BY AUTOMATED COUNT: 15.1 % (ref 10–15)
GFR SERPL CREATININE-BSD FRML MDRD: >90 ML/MIN/1.73M2
GLUCOSE BLD-MCNC: 101 MG/DL (ref 70–99)
HCT VFR BLD AUTO: 31.1 % (ref 35–47)
HGB BLD-MCNC: 9.2 G/DL (ref 11.7–15.7)
IMM GRANULOCYTES # BLD: 0.2 10E3/UL
IMM GRANULOCYTES NFR BLD: 1 %
LACTATE SERPL-SCNC: 1.1 MMOL/L (ref 0.7–2)
LYMPHOCYTES # BLD AUTO: 3.2 10E3/UL (ref 0.8–5.3)
LYMPHOCYTES NFR BLD AUTO: 21 %
MCH RBC QN AUTO: 22.9 PG (ref 26.5–33)
MCHC RBC AUTO-ENTMCNC: 29.6 G/DL (ref 31.5–36.5)
MCV RBC AUTO: 77 FL (ref 78–100)
MONOCYTES # BLD AUTO: 1.3 10E3/UL (ref 0–1.3)
MONOCYTES NFR BLD AUTO: 9 %
NEUTROPHILS # BLD AUTO: 9.9 10E3/UL (ref 1.6–8.3)
NEUTROPHILS NFR BLD AUTO: 65 %
NRBC # BLD AUTO: 0 10E3/UL
NRBC BLD AUTO-RTO: 0 /100
PLATELET # BLD AUTO: 355 10E3/UL (ref 150–450)
POTASSIUM BLD-SCNC: 3.3 MMOL/L (ref 3.4–5.3)
RBC # BLD AUTO: 4.02 10E6/UL (ref 3.8–5.2)
SODIUM SERPL-SCNC: 138 MMOL/L (ref 133–144)
WBC # BLD AUTO: 15.2 10E3/UL (ref 4–11)

## 2022-06-01 PROCEDURE — 250N000011 HC RX IP 250 OP 636: Performed by: NEUROLOGICAL SURGERY

## 2022-06-01 PROCEDURE — 250N000013 HC RX MED GY IP 250 OP 250 PS 637: Performed by: PHYSICIAN ASSISTANT

## 2022-06-01 PROCEDURE — 36415 COLL VENOUS BLD VENIPUNCTURE: CPT | Performed by: HOSPITALIST

## 2022-06-01 PROCEDURE — 85025 COMPLETE CBC W/AUTO DIFF WBC: CPT | Performed by: HOSPITALIST

## 2022-06-01 PROCEDURE — 99222 1ST HOSP IP/OBS MODERATE 55: CPT | Performed by: PHYSICIAN ASSISTANT

## 2022-06-01 PROCEDURE — 5A09357 ASSISTANCE WITH RESPIRATORY VENTILATION, LESS THAN 24 CONSECUTIVE HOURS, CONTINUOUS POSITIVE AIRWAY PRESSURE: ICD-10-PCS | Performed by: PHYSICAL MEDICINE & REHABILITATION

## 2022-06-01 PROCEDURE — 80048 BASIC METABOLIC PNL TOTAL CA: CPT | Performed by: HOSPITALIST

## 2022-06-01 PROCEDURE — 83605 ASSAY OF LACTIC ACID: CPT | Performed by: PHYSICAL MEDICINE & REHABILITATION

## 2022-06-01 PROCEDURE — 128N000003 HC R&B REHAB

## 2022-06-01 PROCEDURE — 93970 EXTREMITY STUDY: CPT

## 2022-06-01 PROCEDURE — 99207 PR SC NO CHARGE VISIT: CPT | Performed by: PHYSICIAN ASSISTANT

## 2022-06-01 PROCEDURE — 250N000011 HC RX IP 250 OP 636: Performed by: PHYSICIAN ASSISTANT

## 2022-06-01 PROCEDURE — 97530 THERAPEUTIC ACTIVITIES: CPT | Mod: GP | Performed by: PHYSICAL THERAPIST

## 2022-06-01 PROCEDURE — 36415 COLL VENOUS BLD VENIPUNCTURE: CPT | Performed by: PHYSICAL MEDICINE & REHABILITATION

## 2022-06-01 PROCEDURE — 250N000013 HC RX MED GY IP 250 OP 250 PS 637: Performed by: NURSE PRACTITIONER

## 2022-06-01 RX ORDER — METHOCARBAMOL 500 MG/1
1000 TABLET, FILM COATED ORAL DAILY PRN
Status: DISCONTINUED | OUTPATIENT
Start: 2022-06-01 | End: 2022-06-06

## 2022-06-01 RX ORDER — METHOCARBAMOL 500 MG/1
1000 TABLET, FILM COATED ORAL 3 TIMES DAILY
Status: DISCONTINUED | OUTPATIENT
Start: 2022-06-01 | End: 2022-06-03

## 2022-06-01 RX ORDER — ACETAMINOPHEN 325 MG/1
975 TABLET ORAL DAILY PRN
Status: DISCONTINUED | OUTPATIENT
Start: 2022-06-01 | End: 2022-06-10 | Stop reason: HOSPADM

## 2022-06-01 RX ORDER — GABAPENTIN 300 MG/1
900 CAPSULE ORAL 3 TIMES DAILY
Status: DISCONTINUED | OUTPATIENT
Start: 2022-06-01 | End: 2022-06-04

## 2022-06-01 RX ORDER — HYDROXYZINE HYDROCHLORIDE 25 MG/1
50 TABLET, FILM COATED ORAL EVERY 6 HOURS PRN
Status: DISCONTINUED | OUTPATIENT
Start: 2022-06-01 | End: 2022-06-10 | Stop reason: HOSPADM

## 2022-06-01 RX ORDER — OXYCODONE HYDROCHLORIDE 5 MG/1
10-15 TABLET ORAL EVERY 4 HOURS PRN
Status: DISCONTINUED | OUTPATIENT
Start: 2022-06-01 | End: 2022-06-10 | Stop reason: HOSPADM

## 2022-06-01 RX ORDER — LISINOPRIL 10 MG/1
10 TABLET ORAL DAILY
Status: DISCONTINUED | OUTPATIENT
Start: 2022-06-02 | End: 2022-06-08

## 2022-06-01 RX ORDER — POLYETHYLENE GLYCOL 3350 17 G/17G
17 POWDER, FOR SOLUTION ORAL DAILY PRN
Status: DISCONTINUED | OUTPATIENT
Start: 2022-06-01 | End: 2022-06-10 | Stop reason: HOSPADM

## 2022-06-01 RX ORDER — ENOXAPARIN SODIUM 100 MG/ML
40 INJECTION SUBCUTANEOUS EVERY 12 HOURS
Status: DISCONTINUED | OUTPATIENT
Start: 2022-06-01 | End: 2022-06-10 | Stop reason: HOSPADM

## 2022-06-01 RX ORDER — FAMOTIDINE 20 MG/1
20 TABLET, FILM COATED ORAL EVERY EVENING
Status: DISCONTINUED | OUTPATIENT
Start: 2022-06-01 | End: 2022-06-10 | Stop reason: HOSPADM

## 2022-06-01 RX ORDER — POTASSIUM CHLORIDE 750 MG/1
20 TABLET, EXTENDED RELEASE ORAL DAILY
Status: DISCONTINUED | OUTPATIENT
Start: 2022-06-01 | End: 2022-06-02

## 2022-06-01 RX ORDER — OXYCODONE HYDROCHLORIDE 5 MG/1
15 TABLET ORAL EVERY 4 HOURS PRN
Status: DISCONTINUED | OUTPATIENT
Start: 2022-06-01 | End: 2022-06-01

## 2022-06-01 RX ORDER — NALOXONE HYDROCHLORIDE 0.4 MG/ML
0.2 INJECTION, SOLUTION INTRAMUSCULAR; INTRAVENOUS; SUBCUTANEOUS
Status: DISCONTINUED | OUTPATIENT
Start: 2022-06-01 | End: 2022-06-10 | Stop reason: HOSPADM

## 2022-06-01 RX ORDER — CEFDINIR 300 MG/1
300 CAPSULE ORAL 2 TIMES DAILY
Qty: 8 CAPSULE | Refills: 0 | Status: ON HOLD | DISCHARGE
Start: 2022-06-01 | End: 2022-06-08

## 2022-06-01 RX ORDER — NALOXONE HYDROCHLORIDE 0.4 MG/ML
0.4 INJECTION, SOLUTION INTRAMUSCULAR; INTRAVENOUS; SUBCUTANEOUS
Status: DISCONTINUED | OUTPATIENT
Start: 2022-06-01 | End: 2022-06-10 | Stop reason: HOSPADM

## 2022-06-01 RX ORDER — CEFDINIR 300 MG/1
300 CAPSULE ORAL 2 TIMES DAILY
Status: COMPLETED | OUTPATIENT
Start: 2022-06-01 | End: 2022-06-05

## 2022-06-01 RX ORDER — HYDROXYZINE HYDROCHLORIDE 25 MG/1
25 TABLET, FILM COATED ORAL 3 TIMES DAILY
Status: DISCONTINUED | OUTPATIENT
Start: 2022-06-01 | End: 2022-06-03

## 2022-06-01 RX ORDER — PANTOPRAZOLE SODIUM 40 MG/1
40 TABLET, DELAYED RELEASE ORAL DAILY
Status: DISCONTINUED | OUTPATIENT
Start: 2022-06-02 | End: 2022-06-10 | Stop reason: HOSPADM

## 2022-06-01 RX ORDER — FAMOTIDINE 20 MG/1
20 TABLET, FILM COATED ORAL DAILY PRN
Status: DISCONTINUED | OUTPATIENT
Start: 2022-06-01 | End: 2022-06-10 | Stop reason: HOSPADM

## 2022-06-01 RX ORDER — HYDROXYZINE HYDROCHLORIDE 25 MG/1
25 TABLET, FILM COATED ORAL EVERY 6 HOURS PRN
Status: DISCONTINUED | OUTPATIENT
Start: 2022-06-01 | End: 2022-06-10 | Stop reason: HOSPADM

## 2022-06-01 RX ORDER — ACETAMINOPHEN 500 MG
1000 TABLET ORAL 3 TIMES DAILY
Status: DISCONTINUED | OUTPATIENT
Start: 2022-06-01 | End: 2022-06-03

## 2022-06-01 RX ADMIN — ACETAMINOPHEN 650 MG: 325 TABLET, FILM COATED ORAL at 06:16

## 2022-06-01 RX ADMIN — OXYCODONE HYDROCHLORIDE 15 MG: 5 TABLET ORAL at 23:45

## 2022-06-01 RX ADMIN — HYDROXYZINE HYDROCHLORIDE 25 MG: 25 TABLET, FILM COATED ORAL at 14:00

## 2022-06-01 RX ADMIN — ACETAMINOPHEN 325MG 975 MG: 325 TABLET ORAL at 17:26

## 2022-06-01 RX ADMIN — METHOCARBAMOL 1000 MG: 500 TABLET ORAL at 14:00

## 2022-06-01 RX ADMIN — HYDROXYZINE HYDROCHLORIDE 75 MG: 50 TABLET, FILM COATED ORAL at 10:48

## 2022-06-01 RX ADMIN — OXYCODONE HYDROCHLORIDE 15 MG: 5 TABLET ORAL at 04:06

## 2022-06-01 RX ADMIN — POTASSIUM CHLORIDE 20 MEQ: 750 TABLET, EXTENDED RELEASE ORAL at 14:48

## 2022-06-01 RX ADMIN — GABAPENTIN 700 MG: 400 CAPSULE ORAL at 08:21

## 2022-06-01 RX ADMIN — ACETAMINOPHEN 650 MG: 325 TABLET, FILM COATED ORAL at 11:00

## 2022-06-01 RX ADMIN — FAMOTIDINE 20 MG: 20 TABLET ORAL at 20:53

## 2022-06-01 RX ADMIN — OXYCODONE HYDROCHLORIDE 15 MG: 5 TABLET ORAL at 08:21

## 2022-06-01 RX ADMIN — METHOCARBAMOL 1000 MG: 500 TABLET ORAL at 01:25

## 2022-06-01 RX ADMIN — HYDROXYZINE HYDROCHLORIDE 25 MG: 25 TABLET, FILM COATED ORAL at 19:37

## 2022-06-01 RX ADMIN — GABAPENTIN 900 MG: 300 CAPSULE ORAL at 14:48

## 2022-06-01 RX ADMIN — CEFDINIR 300 MG: 300 CAPSULE ORAL at 20:53

## 2022-06-01 RX ADMIN — OXYCODONE HYDROCHLORIDE 15 MG: 5 TABLET ORAL at 11:01

## 2022-06-01 RX ADMIN — ENOXAPARIN SODIUM 40 MG: 40 INJECTION SUBCUTANEOUS at 10:49

## 2022-06-01 RX ADMIN — ENOXAPARIN SODIUM 40 MG: 40 INJECTION SUBCUTANEOUS at 20:53

## 2022-06-01 RX ADMIN — HYDROXYZINE HYDROCHLORIDE 75 MG: 50 TABLET, FILM COATED ORAL at 04:06

## 2022-06-01 RX ADMIN — GABAPENTIN 900 MG: 300 CAPSULE ORAL at 19:36

## 2022-06-01 RX ADMIN — OXYCODONE HYDROCHLORIDE 15 MG: 5 TABLET ORAL at 19:37

## 2022-06-01 RX ADMIN — ACETAMINOPHEN 975 MG: 325 TABLET, FILM COATED ORAL at 01:25

## 2022-06-01 RX ADMIN — METHOCARBAMOL 1000 MG: 500 TABLET ORAL at 08:21

## 2022-06-01 RX ADMIN — CEFDINIR 300 MG: 300 CAPSULE ORAL at 14:00

## 2022-06-01 RX ADMIN — PANTOPRAZOLE SODIUM 40 MG: 40 TABLET, DELAYED RELEASE ORAL at 08:21

## 2022-06-01 RX ADMIN — METHOCARBAMOL 1000 MG: 500 TABLET ORAL at 19:36

## 2022-06-01 ASSESSMENT — ACTIVITIES OF DAILY LIVING (ADL)
TOILETING_ISSUES: NO
WEAR_GLASSES_OR_BLIND: YES
ADLS_ACUITY_SCORE: 38
VISION_MANAGEMENT: CONTACTS
DIFFICULTY_EATING/SWALLOWING: NO
FALL_HISTORY_WITHIN_LAST_SIX_MONTHS: NO
DOING_ERRANDS_INDEPENDENTLY_DIFFICULTY: YES
EQUIPMENT_CURRENTLY_USED_AT_HOME: SHOWER CHAIR
ADLS_ACUITY_SCORE: 25
ADLS_ACUITY_SCORE: 26
CONCENTRATING,_REMEMBERING_OR_MAKING_DECISIONS_DIFFICULTY: NO
DRESSING/BATHING_DIFFICULTY: YES
CHANGE_IN_FUNCTIONAL_STATUS_SINCE_ONSET_OF_CURRENT_ILLNESS/INJURY: NO
WALKING_OR_CLIMBING_STAIRS: OTHER (SEE COMMENTS)
ADLS_ACUITY_SCORE: 38
ADLS_ACUITY_SCORE: 38
ADLS_ACUITY_SCORE: 25
ADLS_ACUITY_SCORE: 35
WALKING_OR_CLIMBING_STAIRS_DIFFICULTY: YES
ADLS_ACUITY_SCORE: 38
ADLS_ACUITY_SCORE: 25

## 2022-06-01 NOTE — CONSULTS
Social Work: Initial Assessment with Discharge Plan    Patient Name: Tere Turk  : 1985  Age: 36 year old  MRN: [id number]  Completed assessment with: Chart review and interview with patient   Admitted to ARU: 2022    Presenting Information   Date of SW assessment: 2022  Health Care Directive: Health Care Directive Agent (if patient not able to make decisions)  Primary Health Care Agent: Patient   Secondary Health Care Agent:  Pt  NOK   Living Situation: Pt. lives in [place] and planning to return to [place] at discharge. Lives in single-level home with  Fernando and son (Isak, Kailey). 6 CRYSTAL (R HR) with 1st floor B/B (walk-in shower, tub).   Previous Functional Status: PTA, pt. was independent with ADLs, receiving assistance for IADLs, ambulating household distances with no AD independently. This has been her level of function for the past year. Reports walking 1 block prior to admission and standing tolerance of 2-5 minutes.   DME available: adjustable bed, LSO, HHS, elevated toilet seat.  Patient and family understanding of hospitalization: Appropriate and pleasant.   Cultural/Language/Spiritual Considerations: 37 y/o  woman, english-speaking, and Roman Catholic not listed.     Physical Health  Reason for admission: Neurologic Conditions 03.9 Other Neurologic: s/p L4/5/S1 oblique lateral lumbar interbody fusion with discectomy     Justification for Acute Inpatient Rehabilitation  Tere Turk is a 36 year old female w/ PMH significant for severe obesity (BMI 52), PCOS, obstructive sleep apnea on BiPAP, GERD, essential hypertension, remote history of WPW with successful ablation performed in , and chronic pain.  She presents for an elective LS fusion after failing conservative treatment for her spinal DJD, L5-S1 grade 2 spondylolisthesis, lumbosacral radiculopathy at S1, B pars defect, L4/5 DDD HNP forminal and lateral recess stenosis.  She is now s/p L4/5/S1 oblique lateral  lumbar interbody fusion with discectomy on 5/27/22. She has required medical mgmt of her pain and bowels post-operatively. She has also noted to have a rising WBC from Klebsiella pneumoniae UTI and initiated abx therapy.     Provider Information   Primary Care Physician:System, Provider Not In --See face sheet for demographics.  ARU HUC will schedule PCP apt at discharge.   : None reported     Mental Health/Chemical Dependency:   Diagnosis: None reported   Alcohol/Tobacco/Narcotis: None reported   Support/Services in Place: None reported   Services Needed/Recommended: Supportive services available during ARU stay.  Sexuality/Intimacy: Not discussed     Support System  Marital Status:  to  Myles. Myles works full-time but currently on FMLA. Healthy and well. Able to provide assist and care for pt at discharge.   Family support: 3 y/o son. Parents. Extended family and friends.   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      Discharge Plan   Patient and family discharge goal: Home to [place], pending progress.   Provided Education on discharge plan: Yes  Patient agreeable to discharge plan:  Yes  Provided education and attained signature for Medicare IM and IRF Patient Rights and Privacy Information provided to patient : N/A  Provided patient with [facility] Resources: None reported   Barriers to discharge: Getting services coordinated in [place]    Discharge Recommendations   Disposition: See above   Transportation Needs: family/friends  Name of Transportation Company and Phone: N/A     Additional comments   Discharge UCHE NGUYEN 21 days. SHYLA will  remain available and continue to follow as needs arise.     Please invite to Care Conference:  N/A at this time     [facility], Acute Inpatient Rehab Unit   [facility] [address] [place] [address]  Phone: [phone], Fax: [phone], Pager: [phone]

## 2022-06-01 NOTE — PLAN OF CARE
Physical Therapy Discharge Summary    Reason for therapy discharge:    Discharged to acute rehabilitation facility.    Progress towards therapy goal(s). See goals on Care Plan in [facility] electronic health record for goal details.  Pt met bed mobility goal.  Therapy recommendation(s):    Continued therapy is recommended.  Rationale/Recommendations:  Continued skilled PT to increase LE and core strength and progress independence with all mobility to enable pt to return to PLOF.  .

## 2022-06-01 NOTE — H&P
[facility]   Acute Rehabilitation Unit  Admission History and Physical    CHIEF COMPLAINT   S/p spinal fusion    HISTORY OF PRESENT ILLNESS  Tere Turk is a 36 year old woman with past medical history of obesity,  PCOS, HTN, GERD, chronic low back pain, WPW syndrome s/p ablation ,  and DEREK on Bipap who was admitted for planned lumbar fusion after failing conservative treatment.  She underwent multilevel spinal fusion involving L4-1 lateral interbody fusion with discectomy 22 per Dr. Ling.       Course complicated by acute blood loss anemia, UTI, hypokalemia,  and impaired strength, impaired activity tolerance, impaired balance.  She is currently limited by pain.  She is supine to sit mod I.  Sit to stand with mod assist of one.  Mod assist of 2 for toilet transfer.  Mod to mat assist from mower toilet height.      On arrival to rehab seen sitting in bed, says she is disappointed to be unable to go home from hospital after surgery though acknowledges she would not be able to tolerate flight home. Tere is having low back pain at incision, and some numbness/ tingling in bilateral legs.  She denies n/v/, ab pain, fevers, urinary concerns, sob, and dizziness.  Having some loose stool.  Motivated to improve strength and activity tolerance to discharge home, ideally would like to discharge to airport and fly home.     PAST MEDICAL HISTORY   Reviewed and updated in Epic.  Past Medical History:   Diagnosis Date     Gastroesophageal reflux disease      Hypertension      PCOS (polycystic ovarian syndrome)      Sleep apnea     bipap     WPW (Portia-Parkinson-White syndrome)     ablation        SURGICAL HISTORY  Reviewed and updated in Epic.  Past Surgical History:   Procedure Laterality Date     CARDIAC SURGERY      ablation - WPW      SECTION  2017     COLONOSCOPY       EPIDURAL BLOCK INJECTION      injections with sedation x2 lower back     FUSION SPINE  POSTERIOR MINIMALLY INVASIVE TWO LEVELS N/A 2022    Procedure: L4/5/S1 oblique lateral lumbar interbody fusion with discectomy  L4/5/S1  Posterior minimally invasive pedicle screw placement and posterolateral instrumentation and fusion  Epidural steroid injection;  Surgeon: Carlitos Ling MD;  Location: [facility] OR     GYN SURGERY      polypectomy x2 2016 and 2015     HC HYSTEROSALPINGOGRAM      x2     ORTHOPEDIC SURGERY Right 2020    knee arthroscopy     TUMOR EXCISION      from abdomen area, desmoid tumor, required surgery x3     WOUND DEBRIDEMENT      wound vac after  section incision (hematoma)       SOCIAL HISTORY  Reviewed and updated in Epic.  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  Tobacco use: none  Alcohol use: denies  Illicit drug use: none  Social History     Socioeconomic History     Marital status:      Spouse name: Not on file     Number of children: Not on file     Years of education: Not on file     Highest education level: Not on file   Occupational History     Not on file   Tobacco Use     Smoking status: Never Smoker     Smokeless tobacco: Never Used   Substance and Sexual Activity     Alcohol use: Not Currently     Drug use: Never     Sexual activity: Not on file   Other Topics Concern     Not on file   Social History Narrative     Not on file     Social Determinants of Health     Financial Resource Strain: Not on file   Food Insecurity: Not on file   Transportation Needs: Not on file   Physical Activity: Not on file   Stress: Not on file   Social Connections: Not on file   Intimate Partner Violence: Not on file   Housing Stability: Not on file       FAMILY HISTORY  Reviewed and updated in Epic.  No family history on file.      PRIOR FUNCTIONAL HISTORY   She was tolerating standing for up to 5 minutes due to pain, activity quite limited but was reportedly  "independent.     MEDICATIONS  Scheduled meds  Medications Prior to Admission   Medication Sig Dispense Refill Last Dose     acetaminophen (TYLENOL) 500 MG tablet Take 500-1,000 mg by mouth every 6 hours as needed for mild pain   6/1/2022 at 1100     cefdinir (OMNICEF) 300 MG capsule Take 1 capsule (300 mg) by mouth 2 times daily for 4 days 8 capsule 0 Unknown at Unknown time     gabapentin (NEURONTIN) 300 MG capsule Take 300 mg by mouth 2 times daily   6/1/2022 at 0821     hydrOXYzine (ATARAX) 25 MG tablet Take 1 tablet (25 mg) by mouth 3 times daily 30 tablet 0 6/1/2022 at 1048     metFORMIN (GLUCOPHAGE-XR) 750 MG 24 hr tablet Take 750 mg by mouth 2 times daily (with meals)   Unknown at Unknown time     methocarbamol (ROBAXIN) 750 MG tablet Take 1 tablet (750 mg) by mouth every 8 hours 30 tablet 0 6/1/2022 at 0821     naproxen sodium (ANAPROX) 220 MG tablet Take 220 mg by mouth 2 times daily (with meals)   Unknown at Unknown time     oxyCODONE (ROXICODONE) 10 MG tablet Take 1 tablet (10 mg) by mouth every 4 hours as needed for severe pain ((pain rating 7-10)) 42 tablet 0 5/31/2022 at 1748     oxyCODONE (ROXICODONE) 15 MG tablet Take 1 tablet (15 mg) by mouth every 3 hours as needed for severe pain ((pain rating 7-10)) 60 tablet 0 6/1/2022 at 1101     pantoprazole (PROTONIX) 40 MG EC tablet Take 40 mg by mouth daily   Unknown at Unknown time     lisinopril-hydrochlorothiazide (ZESTORETIC) 20-12.5 MG tablet Take 1 tablet by mouth daily   5/30/2022 at 0847       ALLERGIES   No Known Allergies      REVIEW OF SYSTEMS  A 10 point ROS was performed and negative unless otherwise noted in HPI.         PHYSICAL EXAM  VITAL SIGNS:  /72 (BP Location: Left arm)   Pulse 98   Temp 97.9  F (36.6  C) (Oral)   Resp 16   Ht 1.651 m (5' 5\")   Wt 148.3 kg (327 lb)   SpO2 96%   BMI 54.42 kg/m    BMI:  Estimated body mass index is 54.42 kg/m  as calculated from the following:    Height as of this encounter: 1.651 m (5' " "5\").    Weight as of this encounter: 148.3 kg (327 lb).     General: awake alert  HEENT: mmm  Pulmonary: non labored clear diminished  Cardiovascular: rrr  Abdominal: obese non tender  Extremities: warm, well perfused, no edema in bilateral lower extremities, no tenderness in calves   MSK/neuro:   Mental Status:  alert and oriented x3    Cranial Nerves: grossly normal   Strength:    SF  EF  EE  WE  G   HF  KE  DF  EHL  PF   R  5 5 5 5 5 4 4 4 4 4    L  5 5 5 5 5 3 4- 4 4 4    Reflexes: not tested   Babinski reflex: downgoing bilaterally    Tone per modified Ahsan Scale: none   Abnormal movements: None    Speech:clear coherent   Cognition:linear logical   Gait: not tested  Skin: no redness, warmth, or swelling; surgical wounds with steri-strips in place and healing well. Buttock wounds seem to be healing as well                    LABS  CBC RESULTS: Recent Labs   Lab Test 06/01/22  0717 05/31/22  0547 05/30/22  0602   WBC 15.2* 21.5* 21.6*   RBC 4.02 4.01 4.13   HGB 9.2* 9.1* 9.5*   HCT 31.1* 31.2* 32.1*   MCV 77* 78 78   MCH 22.9* 22.7* 23.0*   MCHC 29.6* 29.2* 29.6*   RDW 15.1* 15.4* 15.4*    373 377     Last Basic Metabolic Panel:  Recent Labs   Lab Test 06/01/22  0717 05/31/22  0547 05/30/22  0602    135 136   POTASSIUM 3.3* 3.7 3.7   CHLORIDE 107 106 103   CO2 27 24 27   ANIONGAP 4 5 6   * 104* 123*   BUN 11 17 23   CR 0.62 1.06* 1.17*   GFRESTIMATED >90 70 62   SURENDRA 8.6 8.3* 8.8         IMPRESSION/PLAN:  Tere Turk is a 36 year old woman with past medical history of HTN, WPW s/p ablation 2014, DEREK, GERD, PCOS, Obesity, and chronic back pain admitted for planned L4/5/s1 interbody fusion with discectomy 5/27/22.  Admitted with impaired strength, impaired activity tolerance and impaired balane 6/1/22.     Admission to acute inpatient rehab s/p lumbar fusion    Impairment group code: 03.9      1. PT, OT a minutes of each on a daily basis, in addition to rehab nursing and close management " of physiatrist.      2. Impairment of ADL's: Noted to have impaired strength, impaired activity tolerance, and impaired balance leading to decreased ability to independently complete ADL's.  Will benefit from ongoing OT with goal for MOD I with basic ADLs.     3. Impairment of mobility:  Noted to have impaired strength, impaired activity tolerance, and impaired balance leading to decreased mobility.  Will benefit from ongoing PT with goal for BILL with basic mobility.     4. Medical Conditions    L5-S1 grade 2 spondylolistheseis,   L4/5 DDD  S/p L4/5/S1 oblique lateral lumbar interbody fusion with discectomy 5/27 per Dr. Ling.   -continue PT/OT  -WBAT  -No Bending, Twisting, climbing, Crawling, No lifting more than 8 lb for 2 weeks, or 15 lb for 2 months or 25 lb for 4 months or 35 lb for 6 months  -continue Brace for 3 months out of bed,6 months in the car   -pain: increase gabapentin 900 mg tid  -continue prn oxycodone 10-15 mg q 4 hours, continue scheduled and prn tylenol, robaxin, atarax, continue ice  -daily dressing changes  -follow up wound check 2 weeks (pcp or neurosurgery), follow up neurosurgery in one month     Klebsiella pneumoniae UTI  -UCx + with leucocytosis was started on rocephin transitioned to cefdinir to complete 7 day course  -continue cefdinir  -trend labs    HTN  pta on lisinopril/hctz  -will restart low dose lisinopril 6/2 and monitor bp/cr.     GERD  -continue pta ppi  -given reported worsening symptoms add pepcid at bedtime and daily prn  -continue to monitor    LAZARA (resolved)   Cr 1.17 5/30 in setting of uti.  Cr stable 6/1 0.62.   -encourage fluids  -trend    Leucocytosis  Felt 2/2 UTI down 15.2 6/1  -trend    Acute blood loss anemia  Hgb stable 9.2 6/1 pre op 11.3  -trend.     Hypokalemia  K 3.3 6/1  -20 mEQ k today  -trend    DEREK  -encourage home bipap use at bedtime     Obesity  PCOS   pta on metformin 750 mg bid  -restart metformin with ongoing cr stability, improved  intake    Right lateral buttock wounds   Was seen by WOCN 5/31. Continue wound care - Leave open to air unless areas start draining       5. Adjustment to disability:  Clinical psychology to eval and treat as indicated  6. FEN: reg  7. Bowel: monitor reported loose  8. Bladder: recent UTI monitor  9. DVT Prophylaxis: lovenox. Had elevated D-dimer on 5/30 - not clear why it was checked. Negative US of bilateral lower extremities on 6/1  10. GI Prophylaxis: ppi + pepcid given gerd reported  11. Code: full  12. Disposition: goal for home (flight to [place])  13. ELOS:  ~ 3 weeks  14. Rehab prognosis:  fair  15. Follow up Appointments on Discharge: pcp, neurosurgery.        discussed with Dr. Mejias, PM&R staff physician     Bailey Mack PA-C  Rehab Service      Physician Attestation     I, Kendra Mejias MD, saw and evaluated Tere Turk as part of a shared APRN/PA visit.     I personally reviewed the vital signs, medications, labs and imaging.    I personally performed the substantive portion of the medical decision making and part of history and P/E for this visit - please see the JANELLE's documentation for full details.      Key management decisions made by me and carried out under my direction:  Tere Turk is a 35 yo female with h/o lumbar spondylolisthesis, spondylosis and DDD who was admitted for an elective surgery on 5/27 and underwent L4/5/S1 oblique lateral lumbar interbody fusion with discectomy. She has relatively uncomplicated hospital course and was admitted to ARU for ongoing medical management and intensive inpatient rehab.    Comorbid medical conditions being managed:   Post-op pain (acute on chronic); wounds (surgical and right buttock area), UTI, HTN, GERD, electrolyte abnormality, DEREK and anemia.     She was I with most aspects of her life but had significant functional decline due to severe back pain months before her surgery. Currently requires mod A of 1-2 for most ADLs and mobility.  Anticipate improvement to mod I level with basic ADLs and mobility but will need a long course of rehab at home and then as outpatient due to chronic deconditioning and some possible new deficits in her bilateral lower extremities.     Will benefit from intensive rehabilitation including 90 minutes each of PT and OT, rehabilitation nursing and close management by physiatry team. Good medical and rehab prognosis. ELOS is 3 weeks.     Kendra Mejias MD  Date of Service (when I saw the patient): 6/1/22

## 2022-06-01 NOTE — PROGRESS NOTES
Chart review:    DAILY PROGRESS NOTE    Tere Turk is a 36 year old old female admitted on 5/27/2022  5:48 AM.    Subjective  Seems based on reports overall improving    Objective    AAOx3, RILEY , f/c 4/4   Ambulating,     Hemoglobin   Date Value Ref Range Status   05/31/2022 9.1 (L) 11.7 - 15.7 g/dL Final   ]  WBC 21  D-Dimmer 2.75 might e post surgical but should be investigated for DVT and I will ask for hospitalist help     Impression / Plan      Plan for today:    Discussed with nurse , order Duplex , with poor ambulation patient high risk for DVT , hospitalist tho assist and patient can be anticoagulated if needed        Otherwise to TCU ASAP

## 2022-06-01 NOTE — PLAN OF CARE
Pt being discharged to Acute rehab today explained DC paperwork including s/s to monitor for, diet, activity, dressing changes and wound care.  New medications were discussed and general questions answered.  Meds sent with patient transport. Medicated prior to discharge. Pt will follow up with MD as directed. Goal Outcome Evaluation:    Plan of Care Reviewed With: mother

## 2022-06-01 NOTE — PLAN OF CARE
"BP (!) 146/69 (BP Location: Left arm)   Pulse 109   Temp 98.4  F (36.9  C) (Temporal)   Resp 18   Ht 1.651 m (5' 5\")   Wt 143.4 kg (316 lb 3.2 oz)   SpO2 98%   BMI 52.62 kg/m    Neuro: a/o x4  Cardiac: WNL  Lungs: WNL  GI: loose stools  : WNL  Pain: 7/10- oxy given   IV: NS @125  Meds: robaxin, oxy, gabapentin  Diet: reg  Activity: assist x2/roxana steady  Misc: Dressings on back- CDI. Uses Bipap overnight. WOC following.  Plan: discharge to ARU tomorrow at 1100 via WC transport.        "

## 2022-06-01 NOTE — PLAN OF CARE
"Occupational Therapy Discharge Summary    Reason for therapy discharge:    Discharged to acute rehabilitation facility.    Progress towards therapy goal(s). See goals on Care Plan in [facility] electronic health record for goal details.  Goals not met.  Barriers to achieving goals:   limited tolerance for therapy and discharge from facility.    Therapy recommendation(s):    Continued therapy is recommended.  Rationale/Recommendations:  per treating OT \"Pt continues to be below baseline level of functioning and requiring increased assist. Recommend ongoing skilled OT while IP and in ARU/TCU setting to improve strength, functional activity tolerance, balance and safety needed for daily tasks\".      **This patient was not seen by writing OT, information for discharge summary taken from treating OT's notes.**    "

## 2022-06-01 NOTE — PROGRESS NOTES
A & O x4. Heavy assist of 2 with roxana boyer. Rating pain 8-9/10, Oxycodone , robaxin and atarax somewhat effective for pain. Saline locked. Ls diminished. Ice pack under both hips. Denied n/v. Dressing CDI. Plan is to discharge to ARU today.

## 2022-06-01 NOTE — PROGRESS NOTES
Case Management Note    CM had VM from Bella Nazario RN, CM w/[employer] ([phone]) following up and offering assistance with discharge planning. CM left message on her secure VM that patient is discharging today at 11am to ARU. ARU obtained prior auth from [employer] yesterday. Provided her with SONY's contact information.     RN CC will continue to follow for discharge planning.    Carrie Jimenez RN, BSN, CPHN, CM  Inpatient Care Coordination - [facility]  [phone]

## 2022-06-01 NOTE — PLAN OF CARE
Goal Outcome Evaluation:    Plan of Care Reviewed With: patient     Overall Patient Progress: no change    Outcome Evaluation: Pt admitted this shift. Calling appropriately with light. Complaint of pain, with scheduled medications helping to relieve. Needing assistance to reposition and transfer. Mother at bedside at admission.

## 2022-06-02 ENCOUNTER — APPOINTMENT (OUTPATIENT)
Dept: OCCUPATIONAL THERAPY | Facility: CLINIC | Age: 37
DRG: 560 | End: 2022-06-02
Attending: PHYSICAL MEDICINE & REHABILITATION
Payer: COMMERCIAL

## 2022-06-02 ENCOUNTER — APPOINTMENT (OUTPATIENT)
Dept: PHYSICAL THERAPY | Facility: CLINIC | Age: 37
DRG: 560 | End: 2022-06-02
Attending: PHYSICAL MEDICINE & REHABILITATION
Payer: COMMERCIAL

## 2022-06-02 LAB
ANION GAP SERPL CALCULATED.3IONS-SCNC: 6 MMOL/L (ref 3–14)
BUN SERPL-MCNC: 12 MG/DL (ref 7–30)
CALCIUM SERPL-MCNC: 9 MG/DL (ref 8.5–10.1)
CHLORIDE BLD-SCNC: 108 MMOL/L (ref 94–109)
CO2 SERPL-SCNC: 27 MMOL/L (ref 20–32)
CREAT SERPL-MCNC: 0.58 MG/DL (ref 0.52–1.04)
CRP SERPL-MCNC: 70 MG/L (ref 0–8)
ERYTHROCYTE [DISTWIDTH] IN BLOOD BY AUTOMATED COUNT: 15 % (ref 10–15)
GFR SERPL CREATININE-BSD FRML MDRD: >90 ML/MIN/1.73M2
GLUCOSE BLD-MCNC: 108 MG/DL (ref 70–99)
HCT VFR BLD AUTO: 29.8 % (ref 35–47)
HGB BLD-MCNC: 9.1 G/DL (ref 11.7–15.7)
MCH RBC QN AUTO: 22.6 PG (ref 26.5–33)
MCHC RBC AUTO-ENTMCNC: 30.5 G/DL (ref 31.5–36.5)
MCV RBC AUTO: 74 FL (ref 78–100)
PLATELET # BLD AUTO: 371 10E3/UL (ref 150–450)
POTASSIUM BLD-SCNC: 3.6 MMOL/L (ref 3.4–5.3)
RBC # BLD AUTO: 4.02 10E6/UL (ref 3.8–5.2)
SODIUM SERPL-SCNC: 141 MMOL/L (ref 133–144)
WBC # BLD AUTO: 15.9 10E3/UL (ref 4–11)

## 2022-06-02 PROCEDURE — 36415 COLL VENOUS BLD VENIPUNCTURE: CPT | Performed by: PHYSICIAN ASSISTANT

## 2022-06-02 PROCEDURE — 86140 C-REACTIVE PROTEIN: CPT | Performed by: PHYSICIAN ASSISTANT

## 2022-06-02 PROCEDURE — 128N000003 HC R&B REHAB

## 2022-06-02 PROCEDURE — 250N000013 HC RX MED GY IP 250 OP 250 PS 637: Performed by: PHYSICIAN ASSISTANT

## 2022-06-02 PROCEDURE — 97163 PT EVAL HIGH COMPLEX 45 MIN: CPT | Mod: GP | Performed by: STUDENT IN AN ORGANIZED HEALTH CARE EDUCATION/TRAINING PROGRAM

## 2022-06-02 PROCEDURE — 80048 BASIC METABOLIC PNL TOTAL CA: CPT | Performed by: PHYSICIAN ASSISTANT

## 2022-06-02 PROCEDURE — 85027 COMPLETE CBC AUTOMATED: CPT | Performed by: PHYSICIAN ASSISTANT

## 2022-06-02 PROCEDURE — 97032 APPL MODALITY 1+ESTIM EA 15: CPT | Mod: GP | Performed by: STUDENT IN AN ORGANIZED HEALTH CARE EDUCATION/TRAINING PROGRAM

## 2022-06-02 PROCEDURE — 97530 THERAPEUTIC ACTIVITIES: CPT | Mod: GP | Performed by: STUDENT IN AN ORGANIZED HEALTH CARE EDUCATION/TRAINING PROGRAM

## 2022-06-02 PROCEDURE — 97535 SELF CARE MNGMENT TRAINING: CPT | Mod: GO

## 2022-06-02 PROCEDURE — 97167 OT EVAL HIGH COMPLEX 60 MIN: CPT | Mod: GO

## 2022-06-02 PROCEDURE — 99232 SBSQ HOSP IP/OBS MODERATE 35: CPT | Performed by: PHYSICIAN ASSISTANT

## 2022-06-02 PROCEDURE — 250N000011 HC RX IP 250 OP 636: Performed by: PHYSICIAN ASSISTANT

## 2022-06-02 RX ADMIN — GABAPENTIN 900 MG: 300 CAPSULE ORAL at 07:55

## 2022-06-02 RX ADMIN — ACETAMINOPHEN 1000 MG: 500 TABLET ORAL at 13:13

## 2022-06-02 RX ADMIN — GABAPENTIN 900 MG: 300 CAPSULE ORAL at 13:13

## 2022-06-02 RX ADMIN — OXYCODONE HYDROCHLORIDE 15 MG: 5 TABLET ORAL at 13:13

## 2022-06-02 RX ADMIN — OXYCODONE HYDROCHLORIDE 15 MG: 5 TABLET ORAL at 04:27

## 2022-06-02 RX ADMIN — ACETAMINOPHEN 1000 MG: 500 TABLET ORAL at 20:13

## 2022-06-02 RX ADMIN — CEFDINIR 300 MG: 300 CAPSULE ORAL at 20:12

## 2022-06-02 RX ADMIN — OXYCODONE HYDROCHLORIDE 15 MG: 5 TABLET ORAL at 18:31

## 2022-06-02 RX ADMIN — FAMOTIDINE 20 MG: 20 TABLET ORAL at 20:12

## 2022-06-02 RX ADMIN — METHOCARBAMOL 1000 MG: 500 TABLET ORAL at 13:13

## 2022-06-02 RX ADMIN — ACETAMINOPHEN 1000 MG: 500 TABLET ORAL at 07:54

## 2022-06-02 RX ADMIN — METHOCARBAMOL 1000 MG: 500 TABLET ORAL at 20:12

## 2022-06-02 RX ADMIN — ENOXAPARIN SODIUM 40 MG: 40 INJECTION SUBCUTANEOUS at 20:13

## 2022-06-02 RX ADMIN — LISINOPRIL 10 MG: 10 TABLET ORAL at 07:56

## 2022-06-02 RX ADMIN — HYDROXYZINE HYDROCHLORIDE 25 MG: 25 TABLET, FILM COATED ORAL at 13:12

## 2022-06-02 RX ADMIN — PANTOPRAZOLE SODIUM 40 MG: 40 TABLET, DELAYED RELEASE ORAL at 07:55

## 2022-06-02 RX ADMIN — OXYCODONE HYDROCHLORIDE 15 MG: 5 TABLET ORAL at 09:21

## 2022-06-02 RX ADMIN — CEFDINIR 300 MG: 300 CAPSULE ORAL at 07:54

## 2022-06-02 RX ADMIN — METHOCARBAMOL 1000 MG: 500 TABLET ORAL at 07:54

## 2022-06-02 RX ADMIN — HYDROXYZINE HYDROCHLORIDE 25 MG: 25 TABLET, FILM COATED ORAL at 04:27

## 2022-06-02 RX ADMIN — Medication 1 CHEW TAB: at 07:54

## 2022-06-02 RX ADMIN — ENOXAPARIN SODIUM 40 MG: 40 INJECTION SUBCUTANEOUS at 07:55

## 2022-06-02 RX ADMIN — GABAPENTIN 900 MG: 300 CAPSULE ORAL at 20:12

## 2022-06-02 RX ADMIN — HYDROXYZINE HYDROCHLORIDE 25 MG: 25 TABLET, FILM COATED ORAL at 07:54

## 2022-06-02 RX ADMIN — POTASSIUM CHLORIDE 20 MEQ: 750 TABLET, EXTENDED RELEASE ORAL at 07:54

## 2022-06-02 RX ADMIN — HYDROXYZINE HYDROCHLORIDE 25 MG: 25 TABLET, FILM COATED ORAL at 20:19

## 2022-06-02 ASSESSMENT — ACTIVITIES OF DAILY LIVING (ADL)
ADLS_ACUITY_SCORE: 39
ADLS_ACUITY_SCORE: 38
ADLS_ACUITY_SCORE: 39
IADLS,_PREVIOUS_FUNCTIONAL_LEVEL: PARTIAL ASSISTANCE
ADLS_ACUITY_SCORE: 39
ADLS_ACUITY_SCORE: 38
ADLS_ACUITY_SCORE: 38
BADLS,_PREVIOUS_FUNCTIONAL_LEVEL: USES DEVICE OR EQUIPMENT;PARTIAL ASSISTANCE
ADLS_ACUITY_SCORE: 38
ADLS_ACUITY_SCORE: 39
ADLS_ACUITY_SCORE: 38
ADLS_ACUITY_SCORE: 39

## 2022-06-02 NOTE — PROGRESS NOTES
"   06/02/22 1026   Quick Adds   Type of Visit Initial PT Evaluation   Living Environment   People in Home spouse   Current Living Arrangements house   Home Accessibility stairs to enter home   Number of Stairs, Main Entrance 6   Stair Railings, Main Entrance railings on both sides of stairs   Transportation Anticipated family or friend will provide   Living Environment Comments Per EMR and pt - Pt. lives in [place] and will return at discharge pending ability to tolerate flight. Lives in single-level home with  Fernando/Myles (goes by middle name  and son (Isak, Kailey). All needs met on 1st floor B/B (walk-in shower, tub). PTA, pt. was independent with ADLs however experienced functional decline in months leading up to surgery d/t pain, receiving assistance for IADLs, ambulating household distances with no AD independently. DME/AE - combination shower chair/commode in use for over toilet and in shower, adjustable bed, LSO, HHS. Kemi mom is here as main support.   Self-Care   Usual Activity Tolerance moderate   Current Activity Tolerance poor   Activity/Exercise/Self-Care Comment Per EMR and pt - Reports walking 1 block prior to admission and standing tolerance of 2-5 minutes.   Post-Acute Assessment Only   Post-Acute Functional Assessment See IP Rehab Daily Documentation Flowsheet for Functional Mobility/ADL Assessment   General Information   Onset of Illness/Injury or Date of Surgery 05/27/22   Referring Physician Bailey Mack, PA   Patient/Family Therapy Goals Statement (PT) \"just to be able to walk. be able to shower\"   Pertinent History of Current Problem (include personal factors and/or comorbidities that impact the POC) past medical history of obesity,  PCOS, HTN, GERD, chronic low back pain, WPW syndrome s/p ablation 2014,  and DEREK on Bipap who was admitted for planned lumbar fusion after failing conservative treatment.  She underwent multilevel spinal fusion involving L4-1 lateral interbody fusion with " discectomy 5/27/22   Existing Precautions/Restrictions fall;spinal   Heart Disease Risk Factors Lack of physical activity;High blood pressure;Stress;Overweight   General Observations pt in bed, pleasant and agreeable to PT eval   Pain Assessment   Patient Currently in Pain Yes, see Vital Sign flowsheet   Range of Motion (ROM)   ROM Comment limted d/t body habitus, peripheral neuropathy. HS ~25*   Strength (Manual Muscle Testing)   Strength Comments RLE knee EXT 3/5, LLE 1/5, hip flexion 1/5, hip ABD/ADD 2/5, B ankle DF 4/5, stefanie EXT 3/5, toe intrinsics 3-/5   Balance   Balance Comments able to sit EOB w/o UE support, perform bimanual task   Sensory Examination   Sensory Perception Comments pt with painful BLE neuropathy, h/o n/t but feels it's improving, FOrmal testing: B feet- hot delayed & diminsihed sensation, cold intact, protective - impaired location discrimnation, vibration intact   Muscle Tone   Muscle Tone no deficits were identified   Clinical Impression   Criteria for Skilled Therapeutic Intervention Yes, treatment indicated   PT Diagnosis (PT) force production deficit   Influenced by the following impairments BLE weakness, pain, post surgical precautions   Functional limitations due to impairments bed mobility, transfers, gait, stairs, household and community mobility, car transfers   Clinical Presentation (PT Evaluation Complexity) Evolving/Changing   Clinical Presentation Rationale pt with > 4 personal factors limiting independence with functional mobility and deficits acress all ICF domains   Clinical Decision Making (Complexity) high complexity   Planned Therapy Interventions (PT) balance training;bed mobility training;strengthening;stair training;orthotic fitting/training;patient/family education;TENS;transfer training;wheelchair management/propulsion training;progressive activity/exercise;risk factor education;stretching;neuromuscular re-education;gait training;home exercise program;lumbar  "stabilization   Anticipated Equipment Needs at Discharge (PT) walker, rolling;wheelchair;gait belt;other (see comments)  (potential need for ramp)   Risk & Benefits of therapy have been explained evaluation/treatment results reviewed;care plan/treatment goals reviewed;risks/benefits reviewed;current/potential barriers reviewed;participants voiced agreement with care plan;participants included;patient   Clinical Impression Comments PLOF ind though with impaired activity tolerance- standing tolerance 5 minutes max, walking up to \"city block\" with years long history of chronic pain. Now with acute on chronic pain, BLE weakness, poor sitting tolerance. Requires heavy Ax2 with roxana steady for functional transfers. WC mobility to be assessed. Pt to benefit from skilled PT to maximize independence with functional mobility, decrease care giver burden. Goals for lmited household ambulation, but with acute on chronic pain and signifcant BLE weakness, may be mixed mobility with manual wc and FWW.   Total Evaluation Time   Total Evaluation Time (Minutes) 20   Physical Therapy Goals   PT Frequency Other (see comments)  ( minutes daily)   PT Predicted Duration/Target Date for Goal Attainment 06/24/22   PT Goals Bed Mobility;Transfers;Gait;Stairs;PT Goal 1   PT: Bed Mobility Modified independent   PT: Transfers Modified independent;Assistive device;Within precautions;Bed to/from chair;Sit to/from stand  (using FWW for home negotiation)   PT: Gait Modified independent;Rolling walker;50 feet  (for household gait)   PT: Stairs Minimal assist;6 stairs;Rail on both sides  (for home access)   PT: Goal 1 car transfer with MOD A or < in order to access personal transportation     "

## 2022-06-02 NOTE — PROGRESS NOTES
"  [facility]   Acute Rehabilitation Unit  Daily progress note    INTERVAL HISTORY  Tere Turk was seen and examined at bedside. Says she slept well, therapy this am was good.  Having some ongoing numbness/tingling in feet wondering about treatment options for this, discussed recent increase in gabapentin will monitor and discuss further pending progress.  Denies other questions or concerns today, GERD improved with pepcid today.        Functionally, undergoing therapy evaluations.         MEDICATIONS    acetaminophen  1,000 mg Oral TID     cefdinir  300 mg Oral BID     enoxaparin ANTICOAGULANT  40 mg Subcutaneous Q12H     famotidine  20 mg Oral QPM     gabapentin  900 mg Oral TID     GUMMY VITAMINS & MINERALS  1 chew tab Oral Daily     hydrOXYzine  25 mg Oral TID     lisinopril  10 mg Oral Daily     methocarbamol  1,000 mg Oral TID     pantoprazole  40 mg Oral Daily     potassium chloride  20 mEq Oral Daily        acetaminophen, famotidine, hydrOXYzine **OR** hydrOXYzine, methocarbamol, naloxone **OR** naloxone **OR** naloxone **OR** naloxone, oxyCODONE IR, polyethylene glycol     PHYSICAL EXAM  /47 (BP Location: Left arm)   Pulse 94   Temp 98.1  F (36.7  C) (Oral)   Resp 18   Ht 1.651 m (5' 5\")   Wt 148.3 kg (327 lb)   SpO2 93%   BMI 54.42 kg/m    Gen: awake alert nad  HEENT: mmm   Cardio: rrr  Pulm: non labored clear   Abd: soft non tender  Ext: warm dry without edema  Neuro/MSK: alert speech clear moves all extremities, reported sensation changes to bottoms of feet.     LABS  CBC RESULTS: Recent Labs   Lab Test 06/02/22  0740 06/01/22  0717 05/31/22  0547   WBC 15.9* 15.2* 21.5*   RBC 4.02 4.02 4.01   HGB 9.1* 9.2* 9.1*   HCT 29.8* 31.1* 31.2*   MCV 74* 77* 78   MCH 22.6* 22.9* 22.7*   MCHC 30.5* 29.6* 29.2*   RDW 15.0 15.1* 15.4*    355 373     Last Basic Metabolic Panel:  Recent Labs   Lab Test 06/02/22  0740 06/01/22  0717 05/31/22  0547    138 " 135   POTASSIUM 3.6 3.3* 3.7   CHLORIDE 108 107 106   CO2 27 27 24   ANIONGAP 6 4 5   * 101* 104*   BUN 12 11 17   CR 0.58 0.62 1.06*   GFRESTIMATED >90 >90 70   SURENDRA 9.0 8.6 8.3*       Rehabilitation - continue comprehensive acute inpatient rehabilitation program with multidisciplinary approach including therapies, rehab nursing, and physiatry following. See interval history for updates.      ASSESSMENT AND PLAN    Tere Turk is a 36 year old woman with past medical history of HTN, WPW s/p ablation 2014, DEREK, GERD, PCOS, Obesity, and chronic back pain admitted for planned L4/5/s1 interbody fusion with discectomy 5/27/22.  Admitted with impaired strength, impaired activity tolerance and impaired balane 6/1/22.     L5-S1 grade 2 spondylolistheseis,   L4/5 DDD  S/p L4/5/S1 oblique lateral lumbar interbody fusion with discectomy 5/27 per Dr. Ling.   -continue PT/OT  -WBAT  -No Bending, Twisting, climbing, Crawling, No lifting more than 8 lb for 2 weeks, or 15 lb for 2 months or 25 lb for 4 months or 35 lb for 6 months  -continue Brace for 3 months out of bed,6 months in the car   -pain: continue gabapentin 900 mg tid (dose increased 6/1)  -continue prn oxycodone 10-15 mg q 4 hours, continue scheduled and prn tylenol, robaxin, atarax, continue ice  -daily dressing changes  -follow up wound check 2 weeks (pcp or neurosurgery), follow up neurosurgery in one month      Klebsiella pneumoniae UTI  -UCx + with leucocytosis was started on rocephin transitioned to cefdinir to complete 7 day course  -continue cefdinir  -trend labs     HTN  pta on lisinopril/hctz  - restart low dose lisinopril 6/2 and monitor bp/cr.      GERD  -continue pta ppi  -given reported worsening symptoms continue pepcid at bedtime and daily prn  -continue to monitor     LAZARA (resolved)   Cr 1.17 5/30 in setting of uti.  Cr stable 6/1 0.62. --> 0.58 6/2  -encourage fluids  -trend     Leucocytosis  Felt 2/2 UTI down 15.2 6/1 stable 15.9 6/2.    -add crp  -trend     Acute blood loss anemia  Hgb stable 9.2 6/1 pre op 11.3  -trend.      Hypokalemia  K 3.3 6/1-->3.6 6/2 received 20 mEQ k 6/1, 6/2  -trend     DREEK  -encourage home bipap use at bedtime      Obesity  PCOS   pta on metformin 750 mg bid  -restart metformin with ongoing cr stability, improved intake     Right lateral buttock wounds   Was seen by [facility] 5/31. Continue wound care - Leave open to air unless areas start draining       1. Adjustment to disability:  Monitor mood  2. FEN: reg  3. Bowel: monitor  4. Bladder: monitoro  5. DVT Prophylaxis: lovenox  6. GI Prophylaxis: ppi + pepcid  7. Code: full  8. Disposition: goal for home   9. ELOS: therapy evaluations today.   10. Follow up Appointments on Discharge: pcp, neurosurgery.           Bailey Mack PA-C  Physical Medicine & Rehabilitation

## 2022-06-02 NOTE — PLAN OF CARE
Goal Outcome Evaluation:    Plan of Care Reviewed With: patient     Overall Patient Progress: no change    Outcome Evaluation: No change in pt progress on shift. calls appropriately. Makes needs known. Advocates for pain medication.    Pt A&Ox4. Cont of B&B per report. Pt c/o back pain that radiates to legs, PRN oxy and atarax given, relief provided. Ice packs and repositioning utilized. Pt sleeping in between cares. Pt denies SOB, headache, nausea, or new numbness/tingling. Back incision presents, no new skin concerns. Call light in reach, alarms on, continue POC.

## 2022-06-02 NOTE — PLAN OF CARE
"Goal Outcome Evaluation:  Discharge Planner Post-Acute Rehab PT:     Discharge Plan: home with prn assist, home PT    Precautions: falls, spinal, LSO when OOB    Current Status:  Bed Mobility: sup>sit SBA with bedrail  Transfer: Ax2 with roxana steady  Gait: unsafe  Stairs: unsafe  Balance: good sitting balance, needs heady UE support in standing, currently with roxana steady    Assessment:  PLOF ind though with impaired activity tolerance- standing tolerance 5 minutes max, walking up to \"city block\" with years long history of chronic pain. Now with acute on chronic pain, BLE weakness, poor sitting tolerance. Requires heavy Ax2 with roxana steady for functional transfers. WC mobility to be assessed. Pt to benefit from skilled PT to maximize independence with functional mobility, decrease care giver burden. Goals for lmited household ambulation, but with acute on chronic pain and signifcant BLE weakness, may be mixed mobility with manual wc and FWW.    Other Barriers to Discharge (DME, Family Training, etc):   Potential for wc based or mixed mobility with manual wc and FWW  May need ramp- has 6 CRYSTAL with B dami  Family training    Lives in [place] and will need to be able to tolerate prolonged sitting for flight home                      "

## 2022-06-02 NOTE — PLAN OF CARE
FOCUS/GOAL  Pain management    ASSESSMENT, INTERVENTIONS AND CONTINUING PLAN FOR GOAL:  Pt A/O. Pain in mid/low back, radiating to legs/feet. rating 8-9. PRN oxy, tylenol. Robaxin, and atarax utilized as available. Working with therapies, pt continent of B/B, LM 6/2. Assist of 2/ with Mari Santos.

## 2022-06-02 NOTE — PHARMACY-MEDICATION REGIMEN REVIEW
Pharmacy Medication Regimen Review  Tere Turk is a 36 year old female who is currently in the Acute Rehab Unit.    Assessment: All medications have an appropriate indications, durations and no unnecessary use was found    Plan:   Continue medications as ordered. While on Lovenox therapy pharmacy will monitor platelet count.  Monitor for signs/symptoms of bleeding.  Attending provider will be sent this note for review.  If there are any emergent issues noted above, pharmacist will contact provider directly by phone.      Pharmacy will periodically review the resident's medication regimen for any PRN medications not administered in > 72 hours and discontinue them. The pharmacist will discuss gradual dose reductions of psychopharmacologic medications with interdisciplinary team on a regular basis.    Please contact pharmacy if the above does not answer specific medication questions/concerns.    Background:  A pharmacist has reviewed all medications and pertinent medical history today.  Medications were reviewed for appropriate use and any irregularities found are listed with recommendations.      Current Facility-Administered Medications:      acetaminophen (TYLENOL) tablet 1,000 mg, 1,000 mg, Oral, TID, Bailey Mack PA, 1,000 mg at 06/02/22 0754     acetaminophen (TYLENOL) tablet 975 mg, 975 mg, Oral, Daily PRN, Bailey Mack PA, 975 mg at 06/01/22 1726     cefdinir (OMNICEF) capsule 300 mg, 300 mg, Oral, BID, Bailey Mack PA, 300 mg at 06/02/22 0754     enoxaparin ANTICOAGULANT (LOVENOX) injection 40 mg, 40 mg, Subcutaneous, Q12H, Bailey Mack PA, 40 mg at 06/02/22 0755     famotidine (PEPCID) tablet 20 mg, 20 mg, Oral, QPM, Bailey Mack PA, 20 mg at 06/01/22 2053     famotidine (PEPCID) tablet 20 mg, 20 mg, Oral, Daily PRN, Bailey Mack PA     gabapentin (NEURONTIN) capsule 900 mg, 900 mg, Oral, TID, Bailey Mack PA, 900 mg at 06/02/22 0755     GUMMY VITAMINS &  MINERALS chewable tablet 1 chew tab, 1 chew tab, Oral, Daily, Bailey Mack PA, 1 chew tab at 06/02/22 0754     hydrOXYzine (ATARAX) tablet 25 mg, 25 mg, Oral, TID, Bailey Mack PA, 25 mg at 06/02/22 0754     hydrOXYzine (ATARAX) tablet 25 mg, 25 mg, Oral, Q6H PRN, 25 mg at 06/02/22 0427 **OR** hydrOXYzine (ATARAX) tablet 50 mg, 50 mg, Oral, Q6H PRN, Bailey Mack PA     lisinopril (ZESTRIL) tablet 10 mg, 10 mg, Oral, Daily, Bailey Mack PA, 10 mg at 06/02/22 0756     methocarbamol (ROBAXIN) tablet 1,000 mg, 1,000 mg, Oral, TID, Bailey Mack PA, 1,000 mg at 06/02/22 0754     methocarbamol (ROBAXIN) tablet 1,000 mg, 1,000 mg, Oral, Daily PRN, Bailey Mack PA     naloxone (NARCAN) injection 0.2 mg, 0.2 mg, Intravenous, Q2 Min PRN **OR** naloxone (NARCAN) injection 0.4 mg, 0.4 mg, Intravenous, Q2 Min PRN **OR** naloxone (NARCAN) injection 0.2 mg, 0.2 mg, Intramuscular, Q2 Min PRN **OR** naloxone (NARCAN) injection 0.4 mg, 0.4 mg, Intramuscular, Q2 Min PRN, Reza Maravilla MD     oxyCODONE (ROXICODONE) tablet 10-15 mg, 10-15 mg, Oral, Q4H PRN, Bailey Mack PA, 15 mg at 06/02/22 0921     pantoprazole (PROTONIX) EC tablet 40 mg, 40 mg, Oral, Daily, Bailey Mack PA, 40 mg at 06/02/22 0755     polyethylene glycol (MIRALAX) Packet 17 g, 17 g, Oral, Daily PRN, Bailey Mack PA     potassium chloride ER (KLOR-CON M) CR tablet 20 mEq, 20 mEq, Oral, Daily, Bailey Mack, PA, 20 mEq at 06/02/22 0756

## 2022-06-02 NOTE — PLAN OF CARE
FOCUS/GOAL  Bowel management, Bladder management, Pain management, Wound care management, Medical management, Mobility, Skin integrity, and Safety management    ASSESSMENT, INTERVENTIONS AND CONTINUING PLAN FOR GOAL:  Patient is alert and oriented x 4 uses call light appropriately and able to make needs known. A-2 roxana steady lift. Regular/thin take pills whole. Complained of blower back pain PRN and scheduled pain medication,ice pack and repositioning with pillows was effective. Wears back brace when OOB. Continent of B/B last BM 06/01 this shift PVR 12. VS WDL no care concern at this time. BIPAP at night. Call light is in reach alarm is on for safety. We will continue per POC.   Goal Outcome Evaluation:

## 2022-06-02 NOTE — PROGRESS NOTES
Discharge Planner Post-Acute Rehab OT:     Discharge Plan: home in [place] with family A, HCOT    Precautions: falls, impaired BLE sensation, spinal - no bending, twisting or lifting >8# until 6/10, TLSO OOB,     Current Status:  ADLs:    Mobility: Ax2 SaraStedy from elevated height, vcs to limit UB use d/t precautions    Grooming: NT    Dressing: NT    Bathing: NT    Toileting: Transfer Ax2 SaraStedy > OTC. Footrest for RLE for pain mgmt. Dependent hygiene mgmt Ax2 SaraStedy.  IADLs: IND PTA, including driving and childcare.   Vision/Cognition: No concerns, continue to monitor.    Assessment: Pt evaluated in  ARU s/p spinal fusion. Pt IND high level  I/ADLs PTA with functional decline in months preceding surgery, now requires significant assistance for ADLs and presents with significant fall risk. Performance limited by precautions, deconditioning, pain mgmt, and low activity tolerance. Goals to progress to Mod I - SBA ADLs and light IADLs mixed mobility and A from family in [place] at discharge. Pt will benefit from skilled occupational therapy to progress goals in POC. ELOS 3-4 weeks.    Other Barriers to Discharge (DME, Family Training, etc): Pt lives in [place], 6 CRYSTAL and all needs on main floor. Needs to tolerate upright position for flight home. Mother Kemi in [place] for family training and assist. Lives with spouse Fernando and son Isak (4 y.o) with special needs. Parents moved to [place] to assist as needed once home. Walk in shower. Precautions - No lifting more than 8 lb for 2 weeks, or 15 lb for 2 months or 25 lb for 4 months or 35 lb for 6 months.     Family training - not scheduled as of 6/2.    DME - Pt has adjustable bed, HHSH, combination shower chair/OTC, reacher.

## 2022-06-03 ENCOUNTER — APPOINTMENT (OUTPATIENT)
Dept: PHYSICAL THERAPY | Facility: CLINIC | Age: 37
DRG: 560 | End: 2022-06-03
Attending: PHYSICAL MEDICINE & REHABILITATION
Payer: COMMERCIAL

## 2022-06-03 ENCOUNTER — APPOINTMENT (OUTPATIENT)
Dept: OCCUPATIONAL THERAPY | Facility: CLINIC | Age: 37
DRG: 560 | End: 2022-06-03
Attending: PHYSICAL MEDICINE & REHABILITATION
Payer: COMMERCIAL

## 2022-06-03 PROCEDURE — 250N000013 HC RX MED GY IP 250 OP 250 PS 637: Performed by: PHYSICIAN ASSISTANT

## 2022-06-03 PROCEDURE — 250N000011 HC RX IP 250 OP 636: Performed by: PHYSICIAN ASSISTANT

## 2022-06-03 PROCEDURE — 128N000003 HC R&B REHAB

## 2022-06-03 PROCEDURE — 97535 SELF CARE MNGMENT TRAINING: CPT | Mod: GO

## 2022-06-03 PROCEDURE — 97530 THERAPEUTIC ACTIVITIES: CPT | Mod: GP

## 2022-06-03 PROCEDURE — 99233 SBSQ HOSP IP/OBS HIGH 50: CPT | Performed by: PHYSICIAN ASSISTANT

## 2022-06-03 RX ORDER — METHOCARBAMOL 500 MG/1
1000 TABLET, FILM COATED ORAL 3 TIMES DAILY
Status: DISCONTINUED | OUTPATIENT
Start: 2022-06-03 | End: 2022-06-06

## 2022-06-03 RX ORDER — ACETAMINOPHEN 500 MG
1000 TABLET ORAL EVERY 8 HOURS
Status: DISCONTINUED | OUTPATIENT
Start: 2022-06-03 | End: 2022-06-10 | Stop reason: HOSPADM

## 2022-06-03 RX ORDER — HYDROXYZINE HYDROCHLORIDE 25 MG/1
25 TABLET, FILM COATED ORAL 3 TIMES DAILY
Status: DISCONTINUED | OUTPATIENT
Start: 2022-06-03 | End: 2022-06-10 | Stop reason: HOSPADM

## 2022-06-03 RX ADMIN — METHOCARBAMOL 1000 MG: 500 TABLET ORAL at 06:59

## 2022-06-03 RX ADMIN — METHOCARBAMOL 1000 MG: 500 TABLET ORAL at 12:24

## 2022-06-03 RX ADMIN — ENOXAPARIN SODIUM 40 MG: 40 INJECTION SUBCUTANEOUS at 20:18

## 2022-06-03 RX ADMIN — PANTOPRAZOLE SODIUM 40 MG: 40 TABLET, DELAYED RELEASE ORAL at 07:05

## 2022-06-03 RX ADMIN — ACETAMINOPHEN 1000 MG: 500 TABLET ORAL at 14:18

## 2022-06-03 RX ADMIN — HYDROXYZINE HYDROCHLORIDE 25 MG: 25 TABLET, FILM COATED ORAL at 08:09

## 2022-06-03 RX ADMIN — HYDROXYZINE HYDROCHLORIDE 25 MG: 25 TABLET, FILM COATED ORAL at 21:23

## 2022-06-03 RX ADMIN — HYDROXYZINE HYDROCHLORIDE 25 MG: 25 TABLET, FILM COATED ORAL at 14:18

## 2022-06-03 RX ADMIN — GABAPENTIN 900 MG: 300 CAPSULE ORAL at 14:18

## 2022-06-03 RX ADMIN — ACETAMINOPHEN 1000 MG: 500 TABLET ORAL at 07:00

## 2022-06-03 RX ADMIN — OXYCODONE HYDROCHLORIDE 15 MG: 5 TABLET ORAL at 08:09

## 2022-06-03 RX ADMIN — OXYCODONE HYDROCHLORIDE 15 MG: 5 TABLET ORAL at 04:24

## 2022-06-03 RX ADMIN — FAMOTIDINE 20 MG: 20 TABLET ORAL at 20:17

## 2022-06-03 RX ADMIN — Medication 1 CHEW TAB: at 08:11

## 2022-06-03 RX ADMIN — ENOXAPARIN SODIUM 40 MG: 40 INJECTION SUBCUTANEOUS at 08:11

## 2022-06-03 RX ADMIN — OXYCODONE HYDROCHLORIDE 15 MG: 5 TABLET ORAL at 00:29

## 2022-06-03 RX ADMIN — CEFDINIR 300 MG: 300 CAPSULE ORAL at 20:17

## 2022-06-03 RX ADMIN — LISINOPRIL 10 MG: 10 TABLET ORAL at 08:10

## 2022-06-03 RX ADMIN — OXYCODONE HYDROCHLORIDE 15 MG: 5 TABLET ORAL at 12:24

## 2022-06-03 RX ADMIN — ACETAMINOPHEN 325MG 975 MG: 325 TABLET ORAL at 16:57

## 2022-06-03 RX ADMIN — ACETAMINOPHEN 1000 MG: 500 TABLET ORAL at 21:22

## 2022-06-03 RX ADMIN — OXYCODONE HYDROCHLORIDE 15 MG: 5 TABLET ORAL at 16:49

## 2022-06-03 RX ADMIN — GABAPENTIN 900 MG: 300 CAPSULE ORAL at 07:05

## 2022-06-03 RX ADMIN — CEFDINIR 300 MG: 300 CAPSULE ORAL at 08:15

## 2022-06-03 RX ADMIN — GABAPENTIN 900 MG: 300 CAPSULE ORAL at 20:17

## 2022-06-03 RX ADMIN — OXYCODONE HYDROCHLORIDE 15 MG: 5 TABLET ORAL at 21:22

## 2022-06-03 RX ADMIN — HYDROXYZINE HYDROCHLORIDE 25 MG: 25 TABLET, FILM COATED ORAL at 04:24

## 2022-06-03 RX ADMIN — METHOCARBAMOL 1000 MG: 500 TABLET ORAL at 20:17

## 2022-06-03 ASSESSMENT — ACTIVITIES OF DAILY LIVING (ADL)
ADLS_ACUITY_SCORE: 39

## 2022-06-03 NOTE — PLAN OF CARE
FOCUS/GOAL  Pain management    ASSESSMENT, INTERVENTIONS AND CONTINUING PLAN FOR GOAL:  Pt A/O. Continent of B/B, voiding and LBM today. Up w/ assist of 2/roxana steady. Pain in low back, PRNs given as available.

## 2022-06-03 NOTE — PROGRESS NOTES
"  [facility]   Acute Rehabilitation Unit  Daily progress note    INTERVAL HISTORY  Tere Turk was seen sitting up in bed, says sleep was interrupted due to pain, wondering if we can space out timed medications more so that they are not all given at one time, this was discussed and gabapentin, tylenol , atarax, and robaxin times were adjusted.  Having 2-3 loose stools daily, no significant ab pain, fevers, appetite good, no n/v/.  Breathing without issue.  Is having some mild dysuria today, on cefdinir for UTI will complete 7 day course.          MEDICATIONS    acetaminophen  1,000 mg Oral Q8H     cefdinir  300 mg Oral BID     enoxaparin ANTICOAGULANT  40 mg Subcutaneous Q12H     famotidine  20 mg Oral QPM     gabapentin  900 mg Oral TID     GUMMY VITAMINS & MINERALS  1 chew tab Oral Daily     hydrOXYzine  25 mg Oral TID     lisinopril  10 mg Oral Daily     methocarbamol  1,000 mg Oral TID     pantoprazole  40 mg Oral Daily        acetaminophen, famotidine, hydrOXYzine **OR** hydrOXYzine, methocarbamol, naloxone **OR** naloxone **OR** naloxone **OR** naloxone, oxyCODONE IR, polyethylene glycol     PHYSICAL EXAM  BP (!) 142/74 (BP Location: Left arm)   Pulse 90   Temp 98.5  F (36.9  C) (Oral)   Resp 16   Ht 1.651 m (5' 5\")   Wt 148.3 kg (327 lb)   SpO2 98%   BMI 54.42 kg/m    Gen: awake alert nad  HEENT: mmm   Cardio: rrr  Pulm: non labored clear on room air.   Abd: soft non tender obese  Ext: warm dry without edema  Neuro/MSK: alert speech clear moves all extremities    LABS  CBC RESULTS:   Recent Labs   Lab Test 06/02/22  0740 06/01/22  0717 05/31/22  0547   WBC 15.9* 15.2* 21.5*   RBC 4.02 4.02 4.01   HGB 9.1* 9.2* 9.1*   HCT 29.8* 31.1* 31.2*   MCV 74* 77* 78   MCH 22.6* 22.9* 22.7*   MCHC 30.5* 29.6* 29.2*   RDW 15.0 15.1* 15.4*    355 373     Last Basic Metabolic Panel:  Recent Labs   Lab Test 06/02/22  0740 06/01/22  0717 05/31/22  0547    138 135 "   POTASSIUM 3.6 3.3* 3.7   CHLORIDE 108 107 106   CO2 27 27 24   ANIONGAP 6 4 5   * 101* 104*   BUN 12 11 17   CR 0.58 0.62 1.06*   GFRESTIMATED >90 >90 70   SURENDRA 9.0 8.6 8.3*       Rehabilitation - continue comprehensive acute inpatient rehabilitation program with multidisciplinary approach including therapies, rehab nursing, and physiatry following. See interval history for updates.      ASSESSMENT AND PLAN    Tere Turk is a 36 year old woman with past medical history of HTN, WPW s/p ablation 2014, DEREK, GERD, PCOS, Obesity, and chronic back pain admitted for planned L4/5/s1 interbody fusion with discectomy 5/27/22.  Admitted with impaired strength, impaired activity tolerance and impaired balane 6/1/22.     L5-S1 grade 2 spondylolistheseis,   L4/5 DDD  S/p L4/5/S1 oblique lateral lumbar interbody fusion with discectomy 5/27 per Dr. Ling.   -continue PT/OT  -WBAT  -No Bending, Twisting, climbing, Crawling, No lifting more than 8 lb for 2 weeks, or 15 lb for 2 months or 25 lb for 4 months or 35 lb for 6 months  -continue Brace for 3 months out of bed, 6 months in the car   -pain: continue gabapentin 900 mg tid (dose increased 6/1)  -continue prn oxycodone 10-15 mg q 4 hours, continue scheduled and prn tylenol, robaxin, atarax, continue ice  -daily dressing changes  -follow up wound check 2 weeks (pcp or neurosurgery), follow up neurosurgery in one month      Klebsiella pneumoniae UTI  -UCx + with leucocytosis was started on rocephin transitioned to cefdinir to complete 7 day course  -continue cefdinir  -trend labs     HTN  pta on lisinopril/hctz  - continue low dose lisinopril restarted 6/2 and monitor bp/cr.      GERD  -continue pta ppi  -given reported worsening symptoms continue pepcid at bedtime and daily prn  -continue to monitor     LAZARA (resolved)   Cr 1.17 5/30 in setting of uti.  Cr stable 6/1 0.62. --> 0.58 6/2  -encourage fluids  -trend     Leucocytosis  Felt 2/2 UTI down 15.2 6/1 stable 15.9  6/2.   -add crp  -trend     Acute blood loss anemia  Hgb stable 9.2 6/1 pre op 11.3  -trend.      Hypokalemia  K 3.3 6/1-->3.6 6/2 received 20 mEQ k 6/1, 6/2  -trend     DEREK  -encourage home bipap use at bedtime      Obesity  PCOS   pta on metformin 750 mg bid  -restart metformin with ongoing cr stability, improved intake     Right lateral buttock wounds   Was seen by WOCN 5/31. Continue wound care - Leave open to air unless areas start draining       1. Adjustment to disability:  Monitor mood  2. FEN: reg  3. Bowel: monitor  4. Bladder: monitoro  5. DVT Prophylaxis: lovenox  6. GI Prophylaxis: ppi + pepcid  7. Code: full  8. Disposition: goal for home   9. ELOS: therapy evaluations today.   10. Follow up Appointments on Discharge: pcp, neurosurgery.           Bailey Mack PA-C  Physical Medicine & Rehabilitation       Seen and discussed with Dr. Mejias      Physician Attestation     I, Kendra Mejias MD, saw and evaluated Tere Turk as part of a shared APRN/PA visit.     I personally reviewed the vital signs, medications and labs.    I personally performed the substantive portion of the medical decision making for this visit - please see the JANELLE's documentation for full details.      Key management decisions made by me and carried out under my direction:   Saw Tere with Bailey today. Discussed pain management and her meds at length. Therapy team has been trying TENS unit. No clear etiology for her loose BMs but will continue to monitor.     She requires A of 1 and roxana stedy for transfers; mainly SBA for bed mobility. No standing or walking yet.     Kendra Mejias MD  Date of Service (when I saw the patient): 06/03/22

## 2022-06-03 NOTE — PLAN OF CARE
Goal Outcome Evaluation:    Plan of Care Reviewed With: patient     Overall Patient Progress: no change    Outcome Evaluation: No change in pt progress on shift.    Pt A&Ox4. A2 sarasteady. Pt c/o back pain throughout shift, PRN oxy and atarax given along with scheduled tylenol and robaxin. Mild relief provided. Pt repositioned frequently and ice packs provided. Pt slept in between cares. Denies SOB, headache, nausea, or new numbness/tingling. Call light in reach, alarms on, continue POC.

## 2022-06-03 NOTE — PLAN OF CARE
"Individualized Overall Plan Of Care (IOPOC)      Rehab diagnosis/Impairment Group Code: Neurologic conditions 03.9 other neurologic: s/p l4/5/s1 oblique lateral lumbar interbody fusion with discectomy  S/p spinal fusion       Expected functional outcome: anticipate improvement to mod I level with transfers and basic ADLs; she will be most likely WC based given the severity of her symptoms and also acute on chronic deconditioning.     Clinical Impression Comments:     Mobility: PLOF ind though with impaired activity tolerance- standing tolerance 5 minutes max, walking up to \"city block\" with years long history of chronic pain. Now with acute on chronic pain, BLE weakness, poor sitting tolerance. Requires heavy Ax2 with roxana steady for functional transfers. WC mobility to be assessed. Pt to benefit from skilled PT to maximize independence with functional mobility, decrease care giver burden. Goals for lmited household ambulation, but with acute on chronic pain and signifcant BLE weakness, may be mixed mobility with manual wc and FWW.    ADL: Pt evaluated in IP ARU s/p spinal fusion. Pt IND high level  I/ADLs PTA with functional decline in months preceding surgery, now requires significant assistance for ADLs and presents with significant fall risk. Performance limited by precautions, deconditioning, pain mgmt, and low activity tolerance. Goals to progress to Mod I - SBA ADLs and light IADLs mixed mobility and A from family in [place] at discharge. Pt will benefit from skilled occupational therapy to progress goals in POC. ELOS 3-4 weeks.      Intensity of therapy:   PT 90 minutes, Other (see comments) ( minutes daily), for 3 weeks  OT 90 minutes, Daily, for 3 weeks       Orthotics spinal brace  Education wound care and spinal precautions   Neuropsychology Testing: No      Medical Prognosis: good medical prognosis; pain management has been challenging       Physician summary statement: she had significant functional " decline prior to admission so recovery and rehab will be slow in the setting of chronic deconditioning, acute on chronic pain and new weakness in bilateral lower extremities      Discharge destination: prior home and family  Discharge rehabilitation needs: home care, RN, PT and OT      Estimated length of stay: 3 weeks       Rehabilitation Physician Kendra Mejias MD

## 2022-06-03 NOTE — PLAN OF CARE
FOCUS/GOAL  Bowel management, Bladder management, Pain management, Mobility, Skin integrity, and Safety management    ASSESSMENT, INTERVENTIONS AND CONTINUING PLAN FOR GOAL:  Patient is alert and oriented x 4 uses call light appropriately and able to make needs known. A-2 rxoana steady lift. Regular/thin take pills whole. Complained of blower back and bilateral leg pain PRN and scheduled pain medication,ice pack and repositioning with pillows was effective. Wears back brace when OOB. Continent of B/B last BM 06/02 this shift PVR completed in this shift. VS WDL no care concern at this time. BIPAP at night. Call light is in reach alarm is on for safety. We will continue per POC.  Goal Outcome Evaluation:

## 2022-06-03 NOTE — PROGRESS NOTES
Discharge Planner Post-Acute Rehab OT:     Discharge Plan: home in [place] with family A, HCOT    Precautions: falls, impaired BLE sensation, spinal - no bending, twisting or lifting >8# until 6/10, TLSO OOB,     Current Status:  ADLs:    Mobility: Ax1-2 SaraStedy from elevated height, vcs to limit UB use d/t precautions. Recommend Ax2 SaraStedy nsg from elevated surface.    Grooming: Set up seated.    Dressing: UB - SBA. A to tighten spinal brace. LB - Max A. Feet - Dependent.    Bathing: Transfer [place] lift <> dependent purple chair. A to wash/dry 3-5 body parts.    Toileting: Transfer Ax2 SaraStedy > toilet. Footrest for RLE for pain mgmt. Dependent hygiene mgmt Ax2 SaraStedy.  IADLs: IND PTA, including driving and childcare.   Vision/Cognition: No concerns, continue to monitor.    Assessment: Facilitated shower and ADL assessment with education on precautions and graded assist. Pt with increased upright tolerance than day prior, continues to be limited by fatigue, precautions, and back pain. Benefits from reinforcement on precautions, rationale for strategies in line with POC as pt motivated to perform beyond current abilities. Continue POC, reinforce EC/WS and precautions within ADLs.    Other Barriers to Discharge (DME, Family Training, etc): Pt lives in [place], 6 CRYSTAL and all needs on main floor. Needs to tolerate upright position for multiple hours and functional transfers required for flight home. Mother Kemi in [place] for family training and assist. Lives with spouse Fernando and son Isak (4 y.o) with special needs. Parents moved to [place] to assist as needed once home. Walk in shower. Precautions - No lifting more than 8 lb for 2 weeks, or 15 lb for 2 months or 25 lb for 4 months or 35 lb for 6 months.     Family training - not scheduled as of 6/2.    DME - Pt has adjustable bed, HHSH, combination shower chair/OTC, reacher.

## 2022-06-03 NOTE — PLAN OF CARE
Goal Outcome Evaluation:  Discharge Planner Post-Acute Rehab PT:     Discharge Plan: home with prn assist, home PT    Precautions: falls, spinal, LSO when OOB    Current Status:  Bed Mobility: sup>sit SBA with bedrail  Transfer: Ax1 roxana stedy  Gait: unsafe  Stairs: unsafe  Balance: good sitting balance, needs heady UE support in standing    Assessment:  W/c changed to TIS to allow inc time OOB by changing seated position with good result. Working on standing tolerance in // bars to progress to transfers. Pt's goal is to complete transfer without Roxana Stedy by Monday.    Other Barriers to Discharge (DME, Family Training, etc):   FWW  Possible w/c  6 CRYSTAL  Family training  Lives in [place] and will need to be able to tolerate prolonged sitting for flight home

## 2022-06-03 NOTE — PROGRESS NOTES
06/02/22 0700   Quick Adds   Type of Visit Initial Occupational Therapy Evaluation   Living Environment   People in Home spouse   Current Living Arrangements house   Home Accessibility stairs to enter home   Number of Stairs, Main Entrance 6   Stair Railings, Main Entrance railing on right side (ascending)   Transportation Anticipated family or friend will provide   Living Environment Comments Pt. lives in [place] and will return at discharge pending ability to tolerate flight. Lives in single-level home with  Fernando/Myles (goes by middle name) and son (Isak, Kailey) who has high needs d/t developmental disability. All needs met on 1st floor, BR walk-in shower and tub.  In [place], pt's mom Kemi is here as main support until return home and parents can provide additional A prn upon discharge in [place].   Self-Care   Usual Activity Tolerance moderate   Current Activity Tolerance poor   Equipment Currently Used at Home   (combination shower/commode chair for use bathing and toileting, adjustable bed, HHSH)   Fall history within last six months no   Activity/Exercise/Self-Care Comment Per EMR and pt -  PTA, pt. was independent without ad with ADLs however  activity tolerance limited by chronic pain and experienced further functional decline in months leading up to surgery d/t pain. Reports walking 1 block prior to admission and standing tolerance of 2-5 minutes.   Instrumental Activities of Daily Living (IADL)   Previous Responsibilities ;driving;meal prep;housekeeping;laundry;shopping;finances   IADL Comments OT: Full-time caregiver/homemaker, previously IND IADLs before functional decline past few months requiring additional A including 3 month respite care funding used for [facility]. Spouse responsible for heavy IADLs.   Post-Acute Assessment Only   Post-Acute Functional Assessment See IP Rehab Daily Documentation Flowsheet for Functional Mobility/ADL Assessment   Previous Level of Function/Home Environm  "  Bathing/Grooming, Premorbid Functional Level uses device or equipment  (shower chair)   Dressing, Premorbid Functional Level independent   Eating/Feeding, Premorbid Functional Level independent   Toileting, Premorbid Functional Level uses device or equipment   BADLs, Premorbid Functional Level uses device or equipment;partial assistance   IADLs, Premorbid Functional Level partial assistance   Bed Mobility, Premorbid Functional Level independent   Transfers, Premorbid Functional Level independent   Household Ambulation, Premorbid Functional Level independent   Stairs, Premorbid Functional Level independent   Community Ambulation, Premorbid Functional Level   (Limited 1 city block per pt)   Functional Cognition, Premorbid Functional Level IND   Previous Level of Function, Premorbid IND   General Information   Onset of Illness/Injury or Date of Surgery 05/27/22   Referring Physician Bailey Mack   Patient/Family Therapy Goal Statement (OT) \"be able to walk around the house\"   Additional Occupational Profile Info/Pertinent History of Current Problem Per EMR - \"Tere Turk is a 36 year old woman with past medical history of HTN, WPW s/p ablation 2014, DEREK, GERD, PCOS, Obesity, and chronic back pain admitted for planned L4/5/s1 interbody fusion with discectomy 5/27/22.  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, lives with spouse   Existing Precautions/Restrictions fall;brace worn when out of bed;lifting;spinal;weight bearing   Limitations/Impairments insensate body part   Left Upper Extremity (Weight-bearing Status) partial weight-bearing (PWB)  (<8 lbs until 6/10, then progressively more over next few months - see orders)   Right Upper Extremity (Weight-bearing Status) partial weight-bearing (PWB)   Left Lower Extremity (Weight-bearing Status) weight-bearing as " tolerated (WBAT)   Right Lower Extremity (Weight-bearing Status) weight-bearing as tolerated (WBAT)   Heart Disease Risk Factors Lack of physical activity;Overweight;Medical history   Cognitive Status Examination   Orientation Status orientation to person, place and time   Cognitive Status Comments OT: Cognition WNL per functional observation, able to ask appropriate questions regarding functional impact of precautions, benefits from education on generalizing precautions.   Visual Perception   Impact of Vision Impairment on Function (Vision) No concerns   Sensory   Sensory Comments Impaired BLE sensation, BUE WFL   Pain Assessment   Patient Currently in Pain Yes, see Vital Sign flowsheet   Integumentary/Edema   Integumentary/Edema Comments Right buttock wounds - dressed, spinal incisions - dressed   Range of Motion Comprehensive   Comment, General Range of Motion UB WFL, limitations in reach per body habitus   Strength Comprehensive (MMT)   Comment, General Manual Muscle Testing (MMT) Assessment No testing d/t precautions, minimum 3+ MMT in observation   Muscle Tone Assessment   Muscle Tone Quick Adds No deficits were identified   Coordination   Coordination Comments Limited by fatigue, no UB gross of fine motor deficits noted in functional observation   Clinical Impression   Criteria for Skilled Therapeutic Interventions Met (OT) Yes, treatment indicated   OT Diagnosis Impaired ADLs, IADLs, and functional mobility   Influenced by the following impairments precautions, chronic pain, body habitus, impaired BLE sensation, fatigue   OT Problem List-Impairments impacting ADL problems related to;activity tolerance impaired;balance;mobility;motor control;sensation;strength;pain;post-surgical precautions;range of motion (ROM)   Assessment of Occupational Performance 5 or more Performance Deficits   Identified Performance Deficits dressing, bathing, toileting, functional mobility, simple meal prep   Planned Therapy  Interventions (OT) ADL retraining;IADL retraining;balance training;bed mobility training;manual therapy;motor coordination training;strengthening;ROM;neuromuscular re-education;stretching;transfer training;home program guidelines;progressive activity/exercise   Clinical Decision Making Complexity (OT) high complexity   Anticipated Equipment Needs Upon Discharge (OT) bathing equipment;dressing equipment;reacher;toileting equipment;bariatric equipment;wheelchair;wheelchair cushion   Risk & Benefits of therapy have been explained evaluation/treatment results reviewed;care plan/treatment goals reviewed;risks/benefits reviewed;current/potential barriers reviewed;participants voiced agreement with care plan;participants included;patient   Clinical Impression Comments Pt evaluated in IP ARU s/p spinal fusion. Pt IND high level  I/ADLs PTA with functional decline in months preceding surgery, now requires significant assistance for ADLs and presents with significant fall risk. Performance limited by precautions, deconditioning, pain mgmt, and low activity tolerance. Goals to progress to Mod I - SBA ADLs and light IADLs mixed mobility and A from family in [place] at discharge. Pt will benefit from skilled occupational therapy to progress goals in POC. ELOS 3-4 weeks.   Total Evaluation Time (Minutes)   Total Evaluation Time (Minutes) 20   OT Goals   Therapy Frequency (OT) Daily   OT Predicted Duration/Target Date for Goal Attainment 06/24/22   OT: Hygiene/Grooming modified independent;within precautions;from wheelchair   OT: Upper Body Dressing Modified independent;including orthotic;within precautions;including set-up/clothing retrieval   OT: Lower Body Dressing Modified independent;using adaptive equipment;within precautions;including set-up/clothing retrieval   OT: Upper Body Bathing Modified independent;within precautions;using adaptive equipment   OT: Lower Body Bathing Modified independent;using adaptive equipment;with  precautions   OT: Bed Mobility Modified independent;supine to/from sitting;within precautions   OT: Transfer Modified independent;with assistive device;within precautions   OT: Toilet Transfer/Toileting Modified independent;toilet transfer;cleaning and garment management   OT: Meal Preparation Modified independent;with simple meal preparation;using adaptive equipment;within precautions   OT: Home Management Modified independent;with light demand household tasks;within precautions

## 2022-06-04 ENCOUNTER — APPOINTMENT (OUTPATIENT)
Dept: OCCUPATIONAL THERAPY | Facility: CLINIC | Age: 37
DRG: 560 | End: 2022-06-04
Attending: PHYSICAL MEDICINE & REHABILITATION
Payer: COMMERCIAL

## 2022-06-04 ENCOUNTER — APPOINTMENT (OUTPATIENT)
Dept: PHYSICAL THERAPY | Facility: CLINIC | Age: 37
DRG: 560 | End: 2022-06-04
Attending: PHYSICAL MEDICINE & REHABILITATION
Payer: COMMERCIAL

## 2022-06-04 LAB
BACTERIA BLD CULT: NO GROWTH
BACTERIA BLD CULT: NO GROWTH
CREAT SERPL-MCNC: 0.54 MG/DL (ref 0.52–1.04)
GFR SERPL CREATININE-BSD FRML MDRD: >90 ML/MIN/1.73M2
PLATELET # BLD AUTO: 335 10E3/UL (ref 150–450)

## 2022-06-04 PROCEDURE — 250N000011 HC RX IP 250 OP 636: Performed by: PHYSICIAN ASSISTANT

## 2022-06-04 PROCEDURE — 128N000003 HC R&B REHAB

## 2022-06-04 PROCEDURE — 97530 THERAPEUTIC ACTIVITIES: CPT | Mod: GP

## 2022-06-04 PROCEDURE — 250N000013 HC RX MED GY IP 250 OP 250 PS 637: Performed by: PHYSICAL MEDICINE & REHABILITATION

## 2022-06-04 PROCEDURE — 97535 SELF CARE MNGMENT TRAINING: CPT | Mod: GO

## 2022-06-04 PROCEDURE — 97110 THERAPEUTIC EXERCISES: CPT | Mod: GP

## 2022-06-04 PROCEDURE — 82565 ASSAY OF CREATININE: CPT | Performed by: PHYSICIAN ASSISTANT

## 2022-06-04 PROCEDURE — 36415 COLL VENOUS BLD VENIPUNCTURE: CPT | Performed by: PHYSICIAN ASSISTANT

## 2022-06-04 PROCEDURE — 250N000013 HC RX MED GY IP 250 OP 250 PS 637: Performed by: PHYSICIAN ASSISTANT

## 2022-06-04 PROCEDURE — 85049 AUTOMATED PLATELET COUNT: CPT | Performed by: PHYSICIAN ASSISTANT

## 2022-06-04 PROCEDURE — 99233 SBSQ HOSP IP/OBS HIGH 50: CPT | Performed by: PHYSICAL MEDICINE & REHABILITATION

## 2022-06-04 RX ORDER — GABAPENTIN 300 MG/1
900 CAPSULE ORAL
Status: DISCONTINUED | OUTPATIENT
Start: 2022-06-05 | End: 2022-06-10 | Stop reason: HOSPADM

## 2022-06-04 RX ORDER — GABAPENTIN 300 MG/1
1200 CAPSULE ORAL AT BEDTIME
Status: DISCONTINUED | OUTPATIENT
Start: 2022-06-04 | End: 2022-06-10 | Stop reason: HOSPADM

## 2022-06-04 RX ADMIN — ACETAMINOPHEN 1000 MG: 500 TABLET ORAL at 21:34

## 2022-06-04 RX ADMIN — METHOCARBAMOL 1000 MG: 500 TABLET ORAL at 13:03

## 2022-06-04 RX ADMIN — OXYCODONE HYDROCHLORIDE 15 MG: 5 TABLET ORAL at 20:30

## 2022-06-04 RX ADMIN — ENOXAPARIN SODIUM 40 MG: 40 INJECTION SUBCUTANEOUS at 20:33

## 2022-06-04 RX ADMIN — ACETAMINOPHEN 1000 MG: 500 TABLET ORAL at 14:18

## 2022-06-04 RX ADMIN — OXYCODONE HYDROCHLORIDE 15 MG: 5 TABLET ORAL at 02:14

## 2022-06-04 RX ADMIN — LISINOPRIL 10 MG: 10 TABLET ORAL at 08:29

## 2022-06-04 RX ADMIN — FAMOTIDINE 20 MG: 20 TABLET ORAL at 20:30

## 2022-06-04 RX ADMIN — HYDROXYZINE HYDROCHLORIDE 25 MG: 25 TABLET, FILM COATED ORAL at 21:34

## 2022-06-04 RX ADMIN — OXYCODONE HYDROCHLORIDE 15 MG: 5 TABLET ORAL at 16:23

## 2022-06-04 RX ADMIN — ENOXAPARIN SODIUM 40 MG: 40 INJECTION SUBCUTANEOUS at 08:29

## 2022-06-04 RX ADMIN — CEFDINIR 300 MG: 300 CAPSULE ORAL at 08:29

## 2022-06-04 RX ADMIN — OXYCODONE HYDROCHLORIDE 15 MG: 5 TABLET ORAL at 10:55

## 2022-06-04 RX ADMIN — METHOCARBAMOL 1000 MG: 500 TABLET ORAL at 05:46

## 2022-06-04 RX ADMIN — GABAPENTIN 900 MG: 300 CAPSULE ORAL at 14:19

## 2022-06-04 RX ADMIN — ACETAMINOPHEN 1000 MG: 500 TABLET ORAL at 05:46

## 2022-06-04 RX ADMIN — GABAPENTIN 900 MG: 300 CAPSULE ORAL at 08:29

## 2022-06-04 RX ADMIN — Medication 1 CHEW TAB: at 08:29

## 2022-06-04 RX ADMIN — PANTOPRAZOLE SODIUM 40 MG: 40 TABLET, DELAYED RELEASE ORAL at 08:29

## 2022-06-04 RX ADMIN — DICLOFENAC SODIUM 4 G: 10 GEL TOPICAL at 16:37

## 2022-06-04 RX ADMIN — GABAPENTIN 1200 MG: 300 CAPSULE ORAL at 21:34

## 2022-06-04 RX ADMIN — METHOCARBAMOL 1000 MG: 500 TABLET ORAL at 20:30

## 2022-06-04 RX ADMIN — OXYCODONE HYDROCHLORIDE 15 MG: 5 TABLET ORAL at 06:53

## 2022-06-04 RX ADMIN — HYDROXYZINE HYDROCHLORIDE 25 MG: 25 TABLET, FILM COATED ORAL at 08:29

## 2022-06-04 RX ADMIN — HYDROXYZINE HYDROCHLORIDE 25 MG: 25 TABLET, FILM COATED ORAL at 14:19

## 2022-06-04 RX ADMIN — CEFDINIR 300 MG: 300 CAPSULE ORAL at 20:30

## 2022-06-04 ASSESSMENT — ACTIVITIES OF DAILY LIVING (ADL)
ADLS_ACUITY_SCORE: 39

## 2022-06-04 NOTE — PLAN OF CARE
Goal Outcome Evaluation:  Discharge Planner Post-Acute Rehab PT:     Discharge Plan: home with prn assist, home PT    Precautions: falls, spinal, LSO when OOB    Current Status:  Bed Mobility: sup>sit SBA with bedrail  Transfer: Ax1 roxana stedy  Gait: unsafe  Stairs: unsafe  Balance: good sitting balance, needs heady UE support in standing    Assessment:  Working on standing tolerance in // bars to progress to transfers, progressed weight shifts and small steps in // bars. Neutral spine, breathing and surgical precaution education with all activity today; verbalized understanding.  Pt's goal is to complete transfer without Roxana Stedy by Monday.    Other Barriers to Discharge (DME, Family Training, etc):   FWW  Possible w/c  6 CRYSTAL  Family training  Lives in [place] and will need to be able to tolerate prolonged sitting for flight home

## 2022-06-04 NOTE — PLAN OF CARE
FOCUS/GOAL  Pain management    ASSESSMENT, INTERVENTIONS AND CONTINUING PLAN FOR GOAL:    Patient is A&Ox4. Woke up at 0210 because of Graham LE pain, given PRN oxycodone at 0214, was able to sleep after. Requested for PRN oxycodone at 0645 for scheduled tylenol and robaxin didn't help with Graham LE pain.  A2 Mari Steady in transfers. Able to weight shift in bed. Continent of B&B. Bipap on overnight. Denies sob, dizziness, chest pain, N/V or any new weakness. Left knee numbness is baseline. Will continue poc.     Goal Outcome Evaluation:    Plan of Care Reviewed With: patient     Overall Patient Progress: improving

## 2022-06-04 NOTE — PROGRESS NOTES
[facility], [place]   Physical Medicine and Rehabilitation Daily Note           Assessment and Plan of Care:   Tere Turk is a 36 year old woman with past medical history of HTN, WPW s/p ablation 2014, DEREK, GERD, PCOS, obesity, and chronic back pain admitted for planned L4/5/S1 interbody fusion with discectomy on 5/27/22.  Admitted with impaired strength, impaired activity tolerance and impaired balane 6/1/22.     --Vitals stable. No labs today.  --To better optimize pain control, will increase bedtime dose of gabapentin from 900 mg to 1200 mg. Additionally, per pain management team recs from 5/31, may recommend a trial of medrol dosepak. Will hold off on any NSAID use for now as it can interfere with bone healing, increase bleeding risk - also risk for kidney injury given that she just recovered from LAZARA.   --Continue ongoing medical management.  --Continue therapies and plan of care.           Interval history:   Patient seen and examined at bedside. Per nursing report, patient waking up overnight with pain in bilateral lower extremities. The patient is requesting to have ibuprofen added to her regimen - she reportedly spoke with her surgeon's nurse who cleared her for ibuprofen use. The patient states that typical pain is an 8-10/10 currently, and is much worse when medications are wearing off. Pain is interfering with sleep.     Denies fever, chills, CP, SOB, N/V, abdominal pain, new weakness/numbness/tingling.             Physical Exam:   VS:   Vitals:    06/02/22 1618 06/03/22 0621 06/03/22 1600 06/04/22 0614   BP: 119/67 (!) 142/74 135/69 114/72   BP Location: Left arm Left arm Right arm Left arm   Patient Position:    Semi-Diane's   Cuff Size:    Adult Large   Pulse: 89 90 97 83   Resp: 16 16 20 18   Temp: 98.8  F (37.1  C) 98.5  F (36.9  C) 98.4  F (36.9  C) 96.9  F (36.1  C)   TempSrc: Oral Oral Oral Oral   SpO2: 99% 98% 96% 95%   Weight:       Height:         Gen: NAD,  resting comfortably in wheelchair  Lungs: breathing unlabored on room air  Ext: no edema in BLE, no calf tenderness  MSK/neuro: Alert and oriented, speech fluent, sitting in w/c - exam deferred for conversation.          Data:   Scheduled meds    acetaminophen  1,000 mg Oral Q8H     cefdinir  300 mg Oral BID     enoxaparin ANTICOAGULANT  40 mg Subcutaneous Q12H     famotidine  20 mg Oral QPM     gabapentin  900 mg Oral TID     GUMMY VITAMINS & MINERALS  1 chew tab Oral Daily     hydrOXYzine  25 mg Oral TID     lisinopril  10 mg Oral Daily     methocarbamol  1,000 mg Oral TID     pantoprazole  40 mg Oral Daily       PRN meds:  acetaminophen, famotidine, hydrOXYzine **OR** hydrOXYzine, methocarbamol, naloxone **OR** naloxone **OR** naloxone **OR** naloxone, oxyCODONE IR, polyethylene glycol      Vanita Marrero MD  Physical Medicine and Rehabilitation     I spent a total of 35 minutes face-to-face and managing the care of Tere Turk. Over 50% of my time on the unit was spent counseling the patient and coordinating care. Please see note for details.

## 2022-06-04 NOTE — PROGRESS NOTES
Discharge Planner Post-Acute Rehab OT:     Discharge Plan: home in [place] with family A, HCOT    Precautions: falls, impaired BLE sensation, spinal - no bending, twisting or lifting >8# until 6/10, TLSO OOB,     Current Status:  ADLs:    Mobility: Ax1-2 SaraStedy from elevated height, vcs to limit UB use d/t precautions. Recommend Ax2 SaraStedy nsg from elevated surface.    Grooming: Set up seated/tilted in w/c.     Dressing: UB - SBA. A to tighten spinal brace. LB - Max A. Feet - Dependent.    Bathing: Transfer Golvo lift <> dependent purple chair. A to wash/dry 3-5 body parts.    Toileting: Transfer Ax2 SaraStedy > toilet. Footrest for RLE for pain mgmt. Dependent hygiene mgmt Ax2 SaraStedy.  IADLs: IND PTA, including driving and childcare.   Vision/Cognition: No concerns, continue to monitor.    Assessment: Low tolerance for sitting upright in tilt in space w/c following toileting needs. Pt declined elevated commode seat over toilet but did need A x2 for bathroom cares/tsfer. Pt had increased 10/10 pain reporting having just received pain meds prior to session. Session shortened to pt declining out of w/c for standing/other functional tasks. Focus on session was pain management, moving therapy times to later to accommodate pain administration prior to therapy, toileting needs, and reviewing goals for increase sitting tolerance upright. Continue POC, reinforce EC/WS and precautions within ADLs.    -40 mins toileting/pain    Other Barriers to Discharge (DME, Family Training, etc): Pt lives in [place], 6 CRYSTAL and all needs on main floor. Needs to tolerate upright position for multiple hours and functional transfers required for flight home. Mother Kemi in [place] for family training and assist. Lives with spouse Fernando and son Isak (4 y.o) with special needs. Parents moved to [place] to assist as needed once home. Walk in shower. Precautions - No lifting more than 8 lb for 2 weeks, or 15 lb for 2 months or 25 lb for 4 months  or 35 lb for 6 months.     Family training - not scheduled as of 6/2.    DME - Pt has adjustable bed, HHSH, combination shower chair/OTC, reacher.

## 2022-06-04 NOTE — PLAN OF CARE
Goal Outcome Evaluation:    Overall Patient Progress: no change    Outcome Evaluation: Pt continues to have pain on her lowe back radiatng to her thighs and lower leg. PRN Oxycodone 15mg given 2x, PRN Tylenol 975mg given once. Scheduled Tylenol and Robaxin given. Medications and repositioning effective, pain reduced after an hour. continent of bladder and bowel, used the toilet with assist of 2 roxana steady. LBM: 6/3pm shift. ate 75% for supper. Used call light appropriately and waited for assistance. Incissins WDL, BRIEN. No wound seen on buttock. Sleeping at end of shift with CPAP on. Both lower extermities elevated with pillows.

## 2022-06-05 ENCOUNTER — APPOINTMENT (OUTPATIENT)
Dept: OCCUPATIONAL THERAPY | Facility: CLINIC | Age: 37
DRG: 560 | End: 2022-06-05
Attending: PHYSICAL MEDICINE & REHABILITATION
Payer: COMMERCIAL

## 2022-06-05 ENCOUNTER — APPOINTMENT (OUTPATIENT)
Dept: PHYSICAL THERAPY | Facility: CLINIC | Age: 37
DRG: 560 | End: 2022-06-05
Attending: PHYSICAL MEDICINE & REHABILITATION
Payer: COMMERCIAL

## 2022-06-05 PROCEDURE — 250N000011 HC RX IP 250 OP 636: Performed by: PHYSICIAN ASSISTANT

## 2022-06-05 PROCEDURE — 250N000013 HC RX MED GY IP 250 OP 250 PS 637: Performed by: PHYSICIAN ASSISTANT

## 2022-06-05 PROCEDURE — 97530 THERAPEUTIC ACTIVITIES: CPT | Mod: GP

## 2022-06-05 PROCEDURE — 250N000013 HC RX MED GY IP 250 OP 250 PS 637: Performed by: PHYSICAL MEDICINE & REHABILITATION

## 2022-06-05 PROCEDURE — 128N000003 HC R&B REHAB

## 2022-06-05 PROCEDURE — 97535 SELF CARE MNGMENT TRAINING: CPT | Mod: GO

## 2022-06-05 PROCEDURE — 97110 THERAPEUTIC EXERCISES: CPT | Mod: GO

## 2022-06-05 PROCEDURE — 97110 THERAPEUTIC EXERCISES: CPT | Mod: GP

## 2022-06-05 RX ADMIN — FAMOTIDINE 20 MG: 20 TABLET ORAL at 21:19

## 2022-06-05 RX ADMIN — GABAPENTIN 900 MG: 300 CAPSULE ORAL at 14:29

## 2022-06-05 RX ADMIN — ACETAMINOPHEN 1000 MG: 500 TABLET ORAL at 05:54

## 2022-06-05 RX ADMIN — HYDROXYZINE HYDROCHLORIDE 25 MG: 25 TABLET, FILM COATED ORAL at 13:27

## 2022-06-05 RX ADMIN — METHOCARBAMOL 1000 MG: 500 TABLET ORAL at 21:18

## 2022-06-05 RX ADMIN — ACETAMINOPHEN 1000 MG: 500 TABLET ORAL at 14:29

## 2022-06-05 RX ADMIN — METHOCARBAMOL 1000 MG: 500 TABLET ORAL at 13:27

## 2022-06-05 RX ADMIN — CEFDINIR 300 MG: 300 CAPSULE ORAL at 08:33

## 2022-06-05 RX ADMIN — GABAPENTIN 900 MG: 300 CAPSULE ORAL at 08:32

## 2022-06-05 RX ADMIN — OXYCODONE HYDROCHLORIDE 15 MG: 5 TABLET ORAL at 02:01

## 2022-06-05 RX ADMIN — Medication 1 CHEW TAB: at 08:33

## 2022-06-05 RX ADMIN — HYDROXYZINE HYDROCHLORIDE 25 MG: 25 TABLET, FILM COATED ORAL at 08:33

## 2022-06-05 RX ADMIN — ENOXAPARIN SODIUM 40 MG: 40 INJECTION SUBCUTANEOUS at 21:17

## 2022-06-05 RX ADMIN — CEFDINIR 300 MG: 300 CAPSULE ORAL at 21:19

## 2022-06-05 RX ADMIN — ACETAMINOPHEN 1000 MG: 500 TABLET ORAL at 21:18

## 2022-06-05 RX ADMIN — OXYCODONE HYDROCHLORIDE 15 MG: 5 TABLET ORAL at 18:54

## 2022-06-05 RX ADMIN — OXYCODONE HYDROCHLORIDE 15 MG: 5 TABLET ORAL at 23:01

## 2022-06-05 RX ADMIN — PANTOPRAZOLE SODIUM 40 MG: 40 TABLET, DELAYED RELEASE ORAL at 08:33

## 2022-06-05 RX ADMIN — GABAPENTIN 1200 MG: 300 CAPSULE ORAL at 21:18

## 2022-06-05 RX ADMIN — ENOXAPARIN SODIUM 40 MG: 40 INJECTION SUBCUTANEOUS at 08:33

## 2022-06-05 RX ADMIN — METHOCARBAMOL 1000 MG: 500 TABLET ORAL at 05:54

## 2022-06-05 RX ADMIN — OXYCODONE HYDROCHLORIDE 15 MG: 5 TABLET ORAL at 06:36

## 2022-06-05 RX ADMIN — HYDROXYZINE HYDROCHLORIDE 25 MG: 25 TABLET, FILM COATED ORAL at 21:19

## 2022-06-05 RX ADMIN — OXYCODONE HYDROCHLORIDE 15 MG: 5 TABLET ORAL at 10:27

## 2022-06-05 RX ADMIN — LISINOPRIL 10 MG: 10 TABLET ORAL at 08:33

## 2022-06-05 RX ADMIN — OXYCODONE HYDROCHLORIDE 15 MG: 5 TABLET ORAL at 14:29

## 2022-06-05 ASSESSMENT — ACTIVITIES OF DAILY LIVING (ADL)
ADLS_ACUITY_SCORE: 43

## 2022-06-05 NOTE — PLAN OF CARE
Goal Outcome Evaluation:    Overall Patient Progress: improving    Outcome Evaluation: Pt complained of consistent pain on lower back radiating to both legs, PRN Oxycodone given 2x, pain down to 6-7 from 8/10. Diclofenac gel started but pt complained of uncomfortable feeling on skin after an hour, refused succeeding application. Ice pack applied to back of knees. ate 100% for supper. Continent of bladder and bowel, used the toilet with assist of 2 roxana steady. Used call light appropriately. Incisions approximated, BRIEN. Sleeping comfortably at end of shift with CPAP on.

## 2022-06-05 NOTE — PLAN OF CARE
"Goal Outcome Evaluation:    Plan of Care Reviewed With: patient     Overall Patient Progress: no change    Outcome Evaluation: Pt still having significant pain. All available PRNs and scheduled medications given throughout the shift. Ice packs and frequent repositioning from bed and to chair also utilized. Pt states \"very little relief\". Rating pain consistently 8-9. Diclofenac gel refused, states that it makes her legs feel worse. Continues to work in therapies      "

## 2022-06-05 NOTE — PLAN OF CARE
FOCUS/GOAL  Pain management and Medical management    ASSESSMENT, INTERVENTIONS AND CONTINUING PLAN FOR GOAL:    Goal Outcome Evaluation:    Plan of Care Reviewed With: patient     Overall Patient Progress: improving    Orientation: A/O x4, calls appropriately  Bowel: Continent using the toilet; LBM 6/4/22  Bladder: Continent using the toilet  Pain: Complained of salvador LE pain, given PRN oxycodone which seems to help pt was able to go back to sleep. Requested for PRN oxycodone this morning before therapy.  Ambulation/Transfers: A2 Mari Steady  Diet/ Liquids/ Pills: Regular, thin. Whole.   Tubes/ Lines/ Drains: None  Skin: Intact

## 2022-06-05 NOTE — PLAN OF CARE
Goal Outcome Evaluation:  Discharge Planner Post-Acute Rehab PT:     Discharge Plan: home with prn assist, home PT    Precautions: falls, spinal, LSO when OOB    Current Status:  Bed Mobility: sup>sit SBA with bedrail  Transfer: stand pivot transfer assist x 1 with FWW in PT;  Roxana steady with Nursing (progress to A x 2 with nursing stand pivot transfer when able)  Gait: pre gait marches small steps in // bars progress to // bars w/c follow when able  Stairs: unsafe  Balance: good sitting balance, needs heavy UE support in standing    Assessment:  Working on standing tolerance in // bars to progress to transfers, progressed weight shifts and small marches in // bars. Neutral spine, breathing and surgical precaution education with all activity today; verbalized understanding. Progressed w/c to bed and w/c to w/c stand pivot transfers (3 total) today with FWW cga to min A;  Continue roxana steady with nursing at this time; primary team to decide when stand pivot is safe at all times; progressing well, still limited by pain but showing functional gains.       Other Barriers to Discharge (DME, Family Training, etc):   FWW  Possible w/c  6 CRYSTAL  Family training  Lives in [place] and will need to be able to tolerate prolonged sitting for flight home

## 2022-06-05 NOTE — PROGRESS NOTES
Discharge Planner Post-Acute Rehab OT:     Discharge Plan: home in [place] with family A, HCOT    Precautions: falls, impaired BLE sensation, spinal - no bending, twisting or lifting >8# until 6/10, TLSO OOB,     Current Status:  ADLs:    Mobility: Ax1-2 SaraStedy from elevated height, vcs to limit UB use d/t precautions. Recommend Ax2 SaraStedy nsg from elevated surface.    Grooming: Set up seated/tilted in w/c.     Dressing: UB - SBA. A to tighten spinal brace. LB - Max A. Feet - Dependent.    Bathing: Transfer Golvo lift <> dependent purple chair. A to wash/dry 3-5 body parts.    Toileting: Transfer Ax2 SaraStedy > toilet. Footrest for RLE for pain mgmt. Dependent hygiene mgmt Ax2 SaraStedy.  IADLs: IND PTA, including driving and childcare.   Vision/Cognition: No concerns, continue to monitor.    Assessment: ADL and roxana stedy transfers with assist of one.  Pt is able to demo SBA supine to sit and consistently needs min A STS.  Th manages her clothing and toilet hygiene.  Pt is good a p.s. and seeking ways to be more IND.  Given leg  to use in bed for weaker LLE.  Pt plans to try shaving with an extended razor this PM.  UE ex with red tband HEP initiated.  Con't OT for ADL training and increasing STS/activity tolerance to wean off the roxana stedy.    -40 mins toileting/pain    Other Barriers to Discharge (DME, Family Training, etc): Pt lives in [place], 6 CRYSTAL and all needs on main floor. Needs to tolerate upright position for multiple hours and functional transfers required for flight home. Mother Kemi in [place] for family training and assist. Lives with spouse Fernando and son Isak (4 y.o) with special needs. Parents moved to [place] to assist as needed once home. Walk in shower. Precautions - No lifting more than 8 lb for 2 weeks, or 15 lb for 2 months or 25 lb for 4 months or 35 lb for 6 months.     Family training - not scheduled as of 6/2.    DME - Pt has adjustable bed, HHSH, combination shower chair/OTC, reacher.

## 2022-06-06 ENCOUNTER — APPOINTMENT (OUTPATIENT)
Dept: PHYSICAL THERAPY | Facility: CLINIC | Age: 37
DRG: 560 | End: 2022-06-06
Attending: PHYSICAL MEDICINE & REHABILITATION
Payer: COMMERCIAL

## 2022-06-06 ENCOUNTER — APPOINTMENT (OUTPATIENT)
Dept: OCCUPATIONAL THERAPY | Facility: CLINIC | Age: 37
DRG: 560 | End: 2022-06-06
Attending: PHYSICAL MEDICINE & REHABILITATION
Payer: COMMERCIAL

## 2022-06-06 LAB
ANION GAP SERPL CALCULATED.3IONS-SCNC: 4 MMOL/L (ref 3–14)
BUN SERPL-MCNC: 12 MG/DL (ref 7–30)
CALCIUM SERPL-MCNC: 8.6 MG/DL (ref 8.5–10.1)
CHLORIDE BLD-SCNC: 110 MMOL/L (ref 94–109)
CO2 SERPL-SCNC: 27 MMOL/L (ref 20–32)
CREAT SERPL-MCNC: 0.56 MG/DL (ref 0.52–1.04)
CRP SERPL-MCNC: 31 MG/L (ref 0–8)
ERYTHROCYTE [DISTWIDTH] IN BLOOD BY AUTOMATED COUNT: 15.4 % (ref 10–15)
GFR SERPL CREATININE-BSD FRML MDRD: >90 ML/MIN/1.73M2
GLUCOSE BLD-MCNC: 102 MG/DL (ref 70–99)
HCT VFR BLD AUTO: 30.4 % (ref 35–47)
HGB BLD-MCNC: 9 G/DL (ref 11.7–15.7)
MCH RBC QN AUTO: 22.9 PG (ref 26.5–33)
MCHC RBC AUTO-ENTMCNC: 29.6 G/DL (ref 31.5–36.5)
MCV RBC AUTO: 77 FL (ref 78–100)
PLATELET # BLD AUTO: 358 10E3/UL (ref 150–450)
POTASSIUM BLD-SCNC: 3.9 MMOL/L (ref 3.4–5.3)
RBC # BLD AUTO: 3.93 10E6/UL (ref 3.8–5.2)
SODIUM SERPL-SCNC: 141 MMOL/L (ref 133–144)
WBC # BLD AUTO: 14.4 10E3/UL (ref 4–11)

## 2022-06-06 PROCEDURE — 250N000013 HC RX MED GY IP 250 OP 250 PS 637: Performed by: PHYSICIAN ASSISTANT

## 2022-06-06 PROCEDURE — 99207 PR SC NO CHARGE VISIT: CPT | Performed by: PHYSICIAN ASSISTANT

## 2022-06-06 PROCEDURE — 86140 C-REACTIVE PROTEIN: CPT | Performed by: PHYSICIAN ASSISTANT

## 2022-06-06 PROCEDURE — 97530 THERAPEUTIC ACTIVITIES: CPT | Mod: GP

## 2022-06-06 PROCEDURE — 128N000003 HC R&B REHAB

## 2022-06-06 PROCEDURE — 97535 SELF CARE MNGMENT TRAINING: CPT | Mod: GO | Performed by: OCCUPATIONAL THERAPIST

## 2022-06-06 PROCEDURE — 80048 BASIC METABOLIC PNL TOTAL CA: CPT | Performed by: PHYSICIAN ASSISTANT

## 2022-06-06 PROCEDURE — 36415 COLL VENOUS BLD VENIPUNCTURE: CPT | Performed by: PHYSICIAN ASSISTANT

## 2022-06-06 PROCEDURE — 250N000013 HC RX MED GY IP 250 OP 250 PS 637: Performed by: PHYSICAL MEDICINE & REHABILITATION

## 2022-06-06 PROCEDURE — 85027 COMPLETE CBC AUTOMATED: CPT | Performed by: PHYSICIAN ASSISTANT

## 2022-06-06 PROCEDURE — 250N000011 HC RX IP 250 OP 636: Performed by: PHYSICIAN ASSISTANT

## 2022-06-06 PROCEDURE — 99233 SBSQ HOSP IP/OBS HIGH 50: CPT | Performed by: PHYSICIAN ASSISTANT

## 2022-06-06 RX ORDER — IBUPROFEN 200 MG
600 TABLET ORAL 3 TIMES DAILY
Status: DISCONTINUED | OUTPATIENT
Start: 2022-06-06 | End: 2022-06-06

## 2022-06-06 RX ORDER — IBUPROFEN 200 MG
600 TABLET ORAL 3 TIMES DAILY
Status: DISCONTINUED | OUTPATIENT
Start: 2022-06-06 | End: 2022-06-10 | Stop reason: HOSPADM

## 2022-06-06 RX ORDER — METHOCARBAMOL 500 MG/1
1000 TABLET, FILM COATED ORAL 4 TIMES DAILY
Status: DISCONTINUED | OUTPATIENT
Start: 2022-06-06 | End: 2022-06-10 | Stop reason: HOSPADM

## 2022-06-06 RX ADMIN — Medication 1 CHEW TAB: at 07:39

## 2022-06-06 RX ADMIN — METHOCARBAMOL 1000 MG: 500 TABLET ORAL at 06:16

## 2022-06-06 RX ADMIN — OXYCODONE HYDROCHLORIDE 15 MG: 5 TABLET ORAL at 20:07

## 2022-06-06 RX ADMIN — IBUPROFEN 600 MG: 200 TABLET, FILM COATED ORAL at 22:49

## 2022-06-06 RX ADMIN — HYDROXYZINE HYDROCHLORIDE 25 MG: 25 TABLET, FILM COATED ORAL at 07:39

## 2022-06-06 RX ADMIN — ACETAMINOPHEN 1000 MG: 500 TABLET ORAL at 13:20

## 2022-06-06 RX ADMIN — OXYCODONE HYDROCHLORIDE 15 MG: 5 TABLET ORAL at 11:53

## 2022-06-06 RX ADMIN — GABAPENTIN 1200 MG: 300 CAPSULE ORAL at 21:27

## 2022-06-06 RX ADMIN — METHOCARBAMOL 1000 MG: 500 TABLET ORAL at 21:23

## 2022-06-06 RX ADMIN — OXYCODONE HYDROCHLORIDE 15 MG: 5 TABLET ORAL at 15:55

## 2022-06-06 RX ADMIN — IBUPROFEN 600 MG: 200 TABLET, FILM COATED ORAL at 11:54

## 2022-06-06 RX ADMIN — IBUPROFEN 600 MG: 200 TABLET, FILM COATED ORAL at 15:55

## 2022-06-06 RX ADMIN — HYDROXYZINE HYDROCHLORIDE 25 MG: 25 TABLET, FILM COATED ORAL at 21:23

## 2022-06-06 RX ADMIN — METHOCARBAMOL 1000 MG: 500 TABLET ORAL at 15:55

## 2022-06-06 RX ADMIN — ACETAMINOPHEN 1000 MG: 500 TABLET ORAL at 21:23

## 2022-06-06 RX ADMIN — FAMOTIDINE 20 MG: 20 TABLET ORAL at 20:08

## 2022-06-06 RX ADMIN — HYDROXYZINE HYDROCHLORIDE 25 MG: 25 TABLET, FILM COATED ORAL at 13:20

## 2022-06-06 RX ADMIN — METHOCARBAMOL 1000 MG: 500 TABLET ORAL at 11:59

## 2022-06-06 RX ADMIN — OXYCODONE HYDROCHLORIDE 15 MG: 5 TABLET ORAL at 07:38

## 2022-06-06 RX ADMIN — OXYCODONE HYDROCHLORIDE 15 MG: 5 TABLET ORAL at 03:03

## 2022-06-06 RX ADMIN — GABAPENTIN 900 MG: 300 CAPSULE ORAL at 13:20

## 2022-06-06 RX ADMIN — ENOXAPARIN SODIUM 40 MG: 40 INJECTION SUBCUTANEOUS at 20:08

## 2022-06-06 RX ADMIN — ACETAMINOPHEN 1000 MG: 500 TABLET ORAL at 06:16

## 2022-06-06 RX ADMIN — LISINOPRIL 10 MG: 10 TABLET ORAL at 07:39

## 2022-06-06 RX ADMIN — PANTOPRAZOLE SODIUM 40 MG: 40 TABLET, DELAYED RELEASE ORAL at 07:39

## 2022-06-06 RX ADMIN — ENOXAPARIN SODIUM 40 MG: 40 INJECTION SUBCUTANEOUS at 10:01

## 2022-06-06 RX ADMIN — GABAPENTIN 900 MG: 300 CAPSULE ORAL at 07:38

## 2022-06-06 ASSESSMENT — ACTIVITIES OF DAILY LIVING (ADL)
ADLS_ACUITY_SCORE: 43

## 2022-06-06 NOTE — PLAN OF CARE
Goal Outcome Evaluation:    Plan of Care Reviewed With: patient     Overall Patient Progress: improving    Outcome Evaluation: Pain managed with oxycodone PRN twice this shift, scheduled Ibuprofen and robaxin. Pain level at 7/10. continent of bladder. Pt had a BM this AM. Mobility at assist of 2 with roxana steady to BR. Pt using call light appropriately. skin integrity continues to improve.using ice packs as needed. Elevated bilateral LE with pillows

## 2022-06-06 NOTE — PLAN OF CARE
Goal Outcome Evaluation:  Discharge Planner Post-Acute Rehab PT:     Discharge Plan: home with prn assist, home PT    Precautions: falls, spinal, LSO when OOB    Current Status:  Bed Mobility: sup>sit SBA with bedrail  Transfer: stand pivot transfer assist x 1 with FWW in PT;  Mari steady with Nursing (progress to A x 2 with nursing stand pivot transfer when able)  Gait: pre gait marches small steps in // bars progress to // bars w/c follow when able  Stairs: unsafe  Balance: good sitting balance, needs heavy UE support in standing    Assessment:  Pt is limited by pain again this date, abbreviated session as a related, however did progress ambulation distance in // bars. Will talk to providers about order for aquacell heating, and double check on the effectiveness of TENs unit.      Other Barriers to Discharge (DME, Family Training, etc):   FWW  Possible w/c  6 CRYSTAL  Family training  Lives in [place] and will need to be able to tolerate prolonged sitting for flight home

## 2022-06-06 NOTE — PLAN OF CARE
FOCUS/GOAL  Pain management and Medical management    ASSESSMENT, INTERVENTIONS AND CONTINUING PLAN FOR GOAL:    Patient requests for pain medication for salvador LE pain when due. Offered Atarax when oxycodone was too soon to give but preferred to just wait until pt can have oxycodone, given at 0303 with partial effect. Pt said she woke up feeling better but still has that burning pain. Cold pack utilized and elevated salvador LE with pillows. Appears sleeping after follow-up. Scheduled tylenol and robaxin given this morning prior to going to the bathroom. A2 Mari steady in transfers. Had a Bm today. Will continue poc.     Goal Outcome Evaluation:    Plan of Care Reviewed With: patient     Overall Patient Progress: no change

## 2022-06-06 NOTE — PROGRESS NOTES
"  [facility]   Acute Rehabilitation Unit  Daily progress note    INTERVAL HISTORY  Tere Turk was seen resting in bed, says pain is overall stable though remains quite uncomfortable at night, she is interested in continuing to titrate pain medication to improve pain, discussed goal for tolerable pain she verbalizes understanding of this, Tere would like to spread out pain medications so she receives scheduled pain meds throughout day with hope that this will improve ability to tolerate therapy and sleep at night.  She denies n/v/, sob, fevers, dizziness or other new concerns.      PT:   Bed Mobility: sup>sit SBA with bedrail  Transfer: stand pivot transfer assist x 1 with FWW in PT;  Mari steady with Nursing (progress to A x 2 with nursing stand pivot transfer when able)  Gait: pre gait marches small steps in // bars progress to // bars w/c follow when able  Stairs: unsafe  Balance: good sitting balance, needs heavy UE support in standing     Assessment:  Pt is limited by pain again this date, abbreviated session as a related, however did progress ambulation distance in // bars.    MEDICATIONS    acetaminophen  1,000 mg Oral Q8H     enoxaparin ANTICOAGULANT  40 mg Subcutaneous Q12H     famotidine  20 mg Oral QPM     gabapentin  1,200 mg Oral At Bedtime     gabapentin  900 mg Oral 2 times per day     GUMMY VITAMINS & MINERALS  1 chew tab Oral Daily     hydrOXYzine  25 mg Oral TID     ibuprofen  600 mg Oral TID     lisinopril  10 mg Oral Daily     methocarbamol  1,000 mg Oral 4x Daily     pantoprazole  40 mg Oral Daily        acetaminophen, famotidine, hydrOXYzine **OR** hydrOXYzine, naloxone **OR** naloxone **OR** naloxone **OR** naloxone, oxyCODONE IR, polyethylene glycol     PHYSICAL EXAM  BP (!) 146/63 (BP Location: Left arm)   Pulse 82   Temp 97  F (36.1  C) (Oral)   Resp 16   Ht 1.651 m (5' 5\")   Wt 148.3 kg (327 lb)   SpO2 98%   BMI 54.42 kg/m    Gen: awake " alert nad  HEENT: mmm   Cardio: rrr  Pulm: non labored clear on room air.   Abd: soft non tender obese  Ext: warm dry without edema  Neuro/MSK: alert speech clear moves all extremities    LABS  CBC RESULTS:   Recent Labs   Lab Test 06/06/22 0621 06/04/22  0534 06/02/22  0740 06/01/22  0717   WBC 14.4*  --  15.9* 15.2*   RBC 3.93  --  4.02 4.02   HGB 9.0*  --  9.1* 9.2*   HCT 30.4*  --  29.8* 31.1*   MCV 77*  --  74* 77*   MCH 22.9*  --  22.6* 22.9*   MCHC 29.6*  --  30.5* 29.6*   RDW 15.4*  --  15.0 15.1*    335 371 355     Last Basic Metabolic Panel:  Recent Labs   Lab Test 06/06/22 0621 06/04/22  0534 06/02/22  0740 06/01/22  0717     --  141 138   POTASSIUM 3.9  --  3.6 3.3*   CHLORIDE 110*  --  108 107   CO2 27  --  27 27   ANIONGAP 4  --  6 4   *  --  108* 101*   BUN 12  --  12 11   CR 0.56 0.54 0.58 0.62   GFRESTIMATED >90 >90 >90 >90   SURENDRA 8.6  --  9.0 8.6       Rehabilitation - continue comprehensive acute inpatient rehabilitation program with multidisciplinary approach including therapies, rehab nursing, and physiatry following. See interval history for updates.      ASSESSMENT AND PLAN    Tere Truk is a 36 year old woman with past medical history of HTN, WPW s/p ablation 2014, DEREK, GERD, PCOS, Obesity, and chronic back pain admitted for planned L4/5/s1 interbody fusion with discectomy 5/27/22.  Admitted with impaired strength, impaired activity tolerance and impaired balane 6/1/22.     L5-S1 grade 2 spondylolistheseis,   L4/5 DDD  S/p L4/5/S1 oblique lateral lumbar interbody fusion with discectomy 5/27 per Dr. Ling.   -continue PT/OT  -WBAT  -No Bending, Twisting, climbing, Crawling, No lifting more than 8 lb for 2 weeks, or 15 lb for 2 months or 25 lb for 4 months or 35 lb for 6 months  -continue Brace for 3 months out of bed, 6 months in the car   -pain: increased to 900 mg bid and 1200 mg at bedtime.   -add ibuprofen 600 mg tid  -continue prn oxycodone 10-15 mg q 4 hours,  continue scheduled robaxin and scheduled and prn tylenol , atarax  - continue ice  -daily dressing changes  -follow up wound check 2 weeks (pcp or neurosurgery), follow up neurosurgery in one month      Klebsiella pneumoniae UTI  -UCx + with leucocytosis was started on rocephin transitioned to cefdinir to completed 7 day course.   -trend labs     HTN  pta on lisinopril/hctz  - continue low dose lisinopril restarted 6/2 and monitor bp/cr.      GERD  -continue pta ppi  -given reported worsening symptoms continue pepcid at bedtime and daily prn  -continue to monitor     LAZARA (resolved)   Cr 1.17 5/30 in setting of uti.  Cr stable 6/1 0.62. --> 0.56 6/6  -encourage fluids     Leucocytosis  Felt 2/2 UTI slow down trend 14.4 6/6.   -trend     Acute blood loss anemia  Hgb stable 9.0 6/6 pre op 11.3  -trend.      Hypokalemia (resolved)   K 3.9 6/6. received 20 mEQ k 6/1, 6/2  -trend     DEREK  -encourage home bipap use at bedtime      Obesity  PCOS   pta on metformin 750 mg bid  -plan to restart metformin with ongoing cr stability, improved intake, currently having diarrhea will hold on restart for now.      Right lateral buttock wounds   Was seen by [facility] 5/31. Continue wound care - Leave open to air unless areas start draining       1. Adjustment to disability:  Monitor mood  2. FEN: reg  3. Bowel: monitor  4. Bladder: monitoro  5. DVT Prophylaxis: lovenox  6. GI Prophylaxis: ppi + pepcid  7. Code: full  8. Disposition: goal for home   9. ELOS: therapy evaluations today.   10. Follow up Appointments on Discharge: pcp, neurosurgery.           Bailey Mack PA-C  Physical Medicine & Rehabilitation       discussed with

## 2022-06-06 NOTE — PLAN OF CARE
Goal Outcome Evaluation:    Plan of Care Reviewed With: patient     Overall Patient Progress: improving    Outcome Evaluation: Pt pain 7-8, PRNs and scheduled medications given as available. Spaced apart when possible for longer pain control. Up with assist of 2/roxana steady but can transfer with assist of 2 stand pivot from w/c to bed. Pain in back and legs depending on positioning. Continent of B/B.

## 2022-06-06 NOTE — PLAN OF CARE
Goal Outcome Evaluation:    Overall Patient Progress: no change    Outcome Evaluation: Pt still having consistent pain on her lower back radiating to both legs. PRN oxycodone given 2x which slightly reduced pain from 8/10 to 7/10. Continues to be assist of 2 with roxana boyer. Continent of bladder and no bm this shift, used the toilet with assistance. Used her call light approrpiatelyt and waited for assistance.

## 2022-06-07 ENCOUNTER — APPOINTMENT (OUTPATIENT)
Dept: OCCUPATIONAL THERAPY | Facility: CLINIC | Age: 37
DRG: 560 | End: 2022-06-07
Attending: PHYSICAL MEDICINE & REHABILITATION
Payer: COMMERCIAL

## 2022-06-07 ENCOUNTER — APPOINTMENT (OUTPATIENT)
Dept: PHYSICAL THERAPY | Facility: CLINIC | Age: 37
DRG: 560 | End: 2022-06-07
Attending: PHYSICAL MEDICINE & REHABILITATION
Payer: COMMERCIAL

## 2022-06-07 LAB
CREAT SERPL-MCNC: 0.71 MG/DL (ref 0.52–1.04)
GFR SERPL CREATININE-BSD FRML MDRD: >90 ML/MIN/1.73M2
PLATELET # BLD AUTO: 328 10E3/UL (ref 150–450)

## 2022-06-07 PROCEDURE — 85049 AUTOMATED PLATELET COUNT: CPT | Performed by: PHYSICIAN ASSISTANT

## 2022-06-07 PROCEDURE — 97530 THERAPEUTIC ACTIVITIES: CPT | Mod: GP

## 2022-06-07 PROCEDURE — 250N000011 HC RX IP 250 OP 636: Performed by: PHYSICIAN ASSISTANT

## 2022-06-07 PROCEDURE — 250N000013 HC RX MED GY IP 250 OP 250 PS 637: Performed by: PHYSICAL MEDICINE & REHABILITATION

## 2022-06-07 PROCEDURE — 97535 SELF CARE MNGMENT TRAINING: CPT | Mod: GO

## 2022-06-07 PROCEDURE — 82565 ASSAY OF CREATININE: CPT | Performed by: PHYSICIAN ASSISTANT

## 2022-06-07 PROCEDURE — 97110 THERAPEUTIC EXERCISES: CPT | Mod: GP

## 2022-06-07 PROCEDURE — 250N000013 HC RX MED GY IP 250 OP 250 PS 637: Performed by: PHYSICIAN ASSISTANT

## 2022-06-07 PROCEDURE — 99233 SBSQ HOSP IP/OBS HIGH 50: CPT | Performed by: PHYSICIAN ASSISTANT

## 2022-06-07 PROCEDURE — 128N000003 HC R&B REHAB

## 2022-06-07 PROCEDURE — 36415 COLL VENOUS BLD VENIPUNCTURE: CPT | Performed by: PHYSICIAN ASSISTANT

## 2022-06-07 RX ORDER — LIDOCAINE 4 G/G
1-2 PATCH TOPICAL
Status: DISCONTINUED | OUTPATIENT
Start: 2022-06-07 | End: 2022-06-09

## 2022-06-07 RX ADMIN — Medication 1 CHEW TAB: at 07:42

## 2022-06-07 RX ADMIN — ACETAMINOPHEN 1000 MG: 500 TABLET ORAL at 06:32

## 2022-06-07 RX ADMIN — IBUPROFEN 600 MG: 200 TABLET, FILM COATED ORAL at 15:37

## 2022-06-07 RX ADMIN — ENOXAPARIN SODIUM 40 MG: 40 INJECTION SUBCUTANEOUS at 07:42

## 2022-06-07 RX ADMIN — METHOCARBAMOL 1000 MG: 500 TABLET ORAL at 21:56

## 2022-06-07 RX ADMIN — IBUPROFEN 600 MG: 200 TABLET, FILM COATED ORAL at 09:52

## 2022-06-07 RX ADMIN — GABAPENTIN 1200 MG: 300 CAPSULE ORAL at 21:55

## 2022-06-07 RX ADMIN — HYDROXYZINE HYDROCHLORIDE 25 MG: 25 TABLET, FILM COATED ORAL at 07:43

## 2022-06-07 RX ADMIN — GABAPENTIN 900 MG: 300 CAPSULE ORAL at 14:20

## 2022-06-07 RX ADMIN — OXYCODONE HYDROCHLORIDE 15 MG: 5 TABLET ORAL at 14:20

## 2022-06-07 RX ADMIN — ACETAMINOPHEN 1000 MG: 500 TABLET ORAL at 14:20

## 2022-06-07 RX ADMIN — FAMOTIDINE 20 MG: 20 TABLET ORAL at 20:31

## 2022-06-07 RX ADMIN — METHOCARBAMOL 1000 MG: 500 TABLET ORAL at 15:37

## 2022-06-07 RX ADMIN — OXYCODONE HYDROCHLORIDE 15 MG: 5 TABLET ORAL at 23:57

## 2022-06-07 RX ADMIN — OXYCODONE HYDROCHLORIDE 15 MG: 5 TABLET ORAL at 00:29

## 2022-06-07 RX ADMIN — ACETAMINOPHEN 1000 MG: 500 TABLET ORAL at 21:54

## 2022-06-07 RX ADMIN — LISINOPRIL 10 MG: 10 TABLET ORAL at 07:42

## 2022-06-07 RX ADMIN — HYDROXYZINE HYDROCHLORIDE 25 MG: 25 TABLET, FILM COATED ORAL at 21:56

## 2022-06-07 RX ADMIN — HYDROXYZINE HYDROCHLORIDE 25 MG: 25 TABLET, FILM COATED ORAL at 14:21

## 2022-06-07 RX ADMIN — ENOXAPARIN SODIUM 40 MG: 40 INJECTION SUBCUTANEOUS at 20:31

## 2022-06-07 RX ADMIN — OXYCODONE HYDROCHLORIDE 15 MG: 5 TABLET ORAL at 05:16

## 2022-06-07 RX ADMIN — LIDOCAINE PATCH 4% 2 PATCH: 40 PATCH TOPICAL at 20:23

## 2022-06-07 RX ADMIN — METHOCARBAMOL 1000 MG: 500 TABLET ORAL at 06:32

## 2022-06-07 RX ADMIN — IBUPROFEN 600 MG: 200 TABLET, FILM COATED ORAL at 21:56

## 2022-06-07 RX ADMIN — OXYCODONE HYDROCHLORIDE 15 MG: 5 TABLET ORAL at 19:05

## 2022-06-07 RX ADMIN — OXYCODONE HYDROCHLORIDE 15 MG: 5 TABLET ORAL at 09:52

## 2022-06-07 RX ADMIN — GABAPENTIN 900 MG: 300 CAPSULE ORAL at 07:43

## 2022-06-07 RX ADMIN — PANTOPRAZOLE SODIUM 40 MG: 40 TABLET, DELAYED RELEASE ORAL at 07:42

## 2022-06-07 RX ADMIN — METHOCARBAMOL 1000 MG: 500 TABLET ORAL at 12:40

## 2022-06-07 ASSESSMENT — ACTIVITIES OF DAILY LIVING (ADL)
ADLS_ACUITY_SCORE: 43

## 2022-06-07 NOTE — PROGRESS NOTES
Discharge Planner Post-Acute Rehab OT:     Discharge Plan: home in [place] with family A, HCOT    Precautions: falls, impaired BLE sensation, spinal - no bending, twisting or lifting >8# until 6/10, TLSO OOB,     Current Status:  ADLs:    Mobility: ok Ax1 BR door to toilet w/fww and 180' SPT, use roxana stedy when fatigued    Grooming: Set up seated    Dressing: UB - SBA. A to tighten spinal brace. LB - Max A. Feet - Dependent.    Bathing: Transfer Golvo lift <> dependent purple chair. A to wash/dry 3-5 body parts.    Toileting: Assist with clothing mngt and edgar hygeine standing w/fww  IADLs: IND PTA, including driving and childcare.   Vision/Cognition: No concerns, continue to monitor.    Assessment: pt progressing and demo'ing consistent SPT w/fww EOB to w/c and w/c to toilet w/fww and Ax1 this session. Continues to require asist for clothing mngt and hygeine cares. OK Ax1 BR door to toilet w/fww and 180' SPT, use roxana stedy when fatigued.      Other Barriers to Discharge (DME, Family Training, etc): Pt lives in [place], 6 CRYSTAL and all needs on main floor. Needs to tolerate upright position for multiple hours and functional transfers required for flight home. Mother Kemi in [place] for family training and assist. Lives with spouse Fernando and son Isak (4 y.o) with special needs. Parents moved to [place] to assist as needed once home. Walk in shower. Precautions - No lifting more than 8 lb for 2 weeks, or 15 lb for 2 months or 25 lb for 4 months or 35 lb for 6 months.     Family training - not scheduled as of 6/2.    DME - Pt has adjustable bed, HHSH, combination shower chair/OTC, reacher, raised toilet seat option.     Equipment needs: grab bars in shower, bidet or toilet aid, AE for dressing

## 2022-06-07 NOTE — PLAN OF CARE
Discharge Planner Post-Acute Rehab PT:     Discharge Plan: home with prn assist, home PT    Precautions: falls, spinal, LSO when OOB    Current Status:  Bed Mobility: sup>sit SBA with bedrail  Transfer: stand pivot transfer assist x 1 with FWW in PT;  Mari steady with Nursing (progress to A x 2 with nursing stand pivot transfer when able)  Gait: pre gait marches small steps in // bars progress to // bars w/c follow when able  Stairs: unsafe  Balance: good sitting balance, needs heavy UE support in standing    Assessment:  Demo improved tolerance to standing and stepping. Will trial short distance amb with WW tomorrow.    Other Barriers to Discharge (DME, Family Training, etc):   FWW  Possible w/c  6 CRYSTAL  Family training  Lives in [place] and will need to be able to tolerate prolonged sitting for flight home

## 2022-06-07 NOTE — PROGRESS NOTES
"  [facility]   Acute Rehabilitation Unit  Daily progress note    INTERVAL HISTORY  Tere Turk was seen sitting up in bed, reports pain is a little better and starting to make some progress with ambulation taking a few steps with walker/ in parallel bars.  Denies dysuria, says stool is firming up.     MEDICATIONS    acetaminophen  1,000 mg Oral Q8H     enoxaparin ANTICOAGULANT  40 mg Subcutaneous Q12H     famotidine  20 mg Oral QPM     gabapentin  1,200 mg Oral At Bedtime     gabapentin  900 mg Oral 2 times per day     GUMMY VITAMINS & MINERALS  1 chew tab Oral Daily     hydrOXYzine  25 mg Oral TID     ibuprofen  600 mg Oral TID     lisinopril  10 mg Oral Daily     methocarbamol  1,000 mg Oral 4x Daily     pantoprazole  40 mg Oral Daily        acetaminophen, famotidine, hydrOXYzine **OR** hydrOXYzine, naloxone **OR** naloxone **OR** naloxone **OR** naloxone, oxyCODONE IR, polyethylene glycol     PHYSICAL EXAM  /60 (BP Location: Left arm, Patient Position: Supine, Cuff Size: Adult Large)   Pulse 78   Temp 97.6  F (36.4  C) (Oral)   Resp 16   Ht 1.651 m (5' 5\")   Wt 148.3 kg (327 lb)   SpO2 99%   BMI 54.42 kg/m    Gen: awake alert nad  HEENT: mmm   Pulm: non labored clear on room air.   Abd: soft non tender obese  Ext: warm dry without edema  Neuro/MSK: alert speech clear moves all extremities    LABS  CBC RESULTS:   Recent Labs   Lab Test 06/07/22  0522 06/06/22  0621 06/04/22  0534 06/02/22  0740 06/01/22  0717   WBC  --  14.4*  --  15.9* 15.2*   RBC  --  3.93  --  4.02 4.02   HGB  --  9.0*  --  9.1* 9.2*   HCT  --  30.4*  --  29.8* 31.1*   MCV  --  77*  --  74* 77*   MCH  --  22.9*  --  22.6* 22.9*   MCHC  --  29.6*  --  30.5* 29.6*   RDW  --  15.4*  --  15.0 15.1*    358 335 371 355     Last Basic Metabolic Panel:  Recent Labs   Lab Test 06/07/22  0522 06/06/22  0621 06/04/22  0534 06/02/22  0740 06/01/22  0717   NA  --  141  --  141 138   POTASSIUM  --  " 3.9  --  3.6 3.3*   CHLORIDE  --  110*  --  108 107   CO2  --  27  --  27 27   ANIONGAP  --  4  --  6 4   GLC  --  102*  --  108* 101*   BUN  --  12  --  12 11   CR 0.71 0.56 0.54 0.58 0.62   GFRESTIMATED >90 >90 >90 >90 >90   SURENDRA  --  8.6  --  9.0 8.6       Rehabilitation - continue comprehensive acute inpatient rehabilitation program with multidisciplinary approach including therapies, rehab nursing, and physiatry following. See interval history for updates.      ASSESSMENT AND PLAN    Tere Turk is a 36 year old woman with past medical history of HTN, WPW s/p ablation 2014, DEREK, GERD, PCOS, Obesity, and chronic back pain admitted for planned L4/5/s1 interbody fusion with discectomy 5/27/22.  Admitted with impaired strength, impaired activity tolerance and impaired balane 6/1/22.     L5-S1 grade 2 spondylolistheseis,   L4/5 DDD  S/p L4/5/S1 oblique lateral lumbar interbody fusion with discectomy 5/27 per Dr. Ling.   -continue PT/OT  -WBAT  -No Bending, Twisting, climbing, Crawling, No lifting more than 8 lb for 2 weeks, or 15 lb for 2 months or 25 lb for 4 months or 35 lb for 6 months  -continue Brace for 3 months out of bed, 6 months in the car   -pain: increased to 900 mg bid and 1200 mg at bedtime.   -continue ibuprofen (started 6/6/22)   -continue prn oxycodone 10-15 mg q 4 hours, continue scheduled robaxin and scheduled and prn tylenol , atarax  - continue ice/heat per patient preference.   -daily dressing changes  -follow up wound check 2 weeks (pcp or neurosurgery), follow up neurosurgery in one month      Klebsiella pneumoniae UTI  -UCx + with leucocytosis was started on rocephin transitioned to cefdinir to completed 7 day course.   -trend labs     HTN  pta on lisinopril/hctz  - continue low dose lisinopril restarted 6/2 and monitor bp/cr.      GERD  -continue pta ppi  -given reported worsening symptoms continue pepcid at bedtime and daily prn  -continue to monitor     LAZARA (resolved)   Cr 1.17 5/30  in setting of uti.  Cr stable 6/1 0.62. --> 0.56 6/6  -encourage fluids     Leucocytosis  Felt 2/2 UTI slow down trend 14.4 6/6.   -trend     Acute blood loss anemia  Hgb stable 9.0 6/6 pre op 11.3  -trend.      Hypokalemia (resolved)   K 3.9 6/6. received 20 mEQ k 6/1, 6/2  -trend     DEREK  -encourage home bipap use at bedtime      Obesity  PCOS   pta on metformin 750 mg bid  -plan to restart metformin with ongoing cr stability, improved intake, currently having diarrhea will hold on restart for now.      Right lateral buttock wounds   Was seen by WOCN 5/31. Continue wound care - Leave open to air unless areas start draining       1. Adjustment to disability:  Monitor mood  2. FEN: reg  3. Bowel: monitor  4. Bladder: monitoro  5. DVT Prophylaxis: lovenox  6. GI Prophylaxis: ppi + pepcid  7. Code: full  8. Disposition: goal for home   9. ELOS: therapy evaluations today.   10. Follow up Appointments on Discharge: pcp, neurosurgery.           Bailey Mack PA-C  Physical Medicine & Rehabilitation       discussed with

## 2022-06-07 NOTE — PLAN OF CARE
FOCUS/GOAL  Medical management    ASSESSMENT, INTERVENTIONS AND CONTINUING PLAN FOR GOAL:  Patient still reporting pain and want her Oxycodone at due time. Ice pack applied w/o patient satisfaction. Patient scheduled for 4 different pain meds at 14:00 but requested PRN Oxycodone  with. Education given regarding opoids and pain management. Provider informed. Upgraded to walk from bathroom door to toilet  As able per therapy. Up to the toilet  In therapy session, had a BM and voided. Will continue with POC.   Goal Outcome Evaluation:    Plan of Care Reviewed With: patient     Overall Patient Progress: no change    Outcome Evaluation: Still reporting pain although pharmacologic and non pharmacologic interventions.

## 2022-06-07 NOTE — PLAN OF CARE
FOCUS/GOAL  Medical management    ASSESSMENT, INTERVENTIONS AND CONTINUING PLAN FOR GOAL:  Patient alert and oriented, able to make needs known  Calls appropriately  Slept most of the night, awaken in between taking pain medications  Pain managed by PRN Oxycodone, Atarax and Ibuprofen, with scheduled Tylenol and Robaxin  Compliant in wearing CPAP at night  Continent of Bladder and Bowel  Safety rounding checked completed, 3 side rails UP, bed alarm ON, call light in reach  No concern overnight, may continue with POC  Goal Outcome Evaluation:

## 2022-06-08 ENCOUNTER — APPOINTMENT (OUTPATIENT)
Dept: OCCUPATIONAL THERAPY | Facility: CLINIC | Age: 37
DRG: 560 | End: 2022-06-08
Attending: PHYSICAL MEDICINE & REHABILITATION
Payer: COMMERCIAL

## 2022-06-08 ENCOUNTER — APPOINTMENT (OUTPATIENT)
Dept: PHYSICAL THERAPY | Facility: CLINIC | Age: 37
DRG: 560 | End: 2022-06-08
Attending: PHYSICAL MEDICINE & REHABILITATION
Payer: COMMERCIAL

## 2022-06-08 LAB — SARS-COV-2 RNA RESP QL NAA+PROBE: NEGATIVE

## 2022-06-08 PROCEDURE — U0005 INFEC AGEN DETEC AMPLI PROBE: HCPCS | Performed by: PHYSICAL MEDICINE & REHABILITATION

## 2022-06-08 PROCEDURE — 250N000011 HC RX IP 250 OP 636: Performed by: PHYSICIAN ASSISTANT

## 2022-06-08 PROCEDURE — 97535 SELF CARE MNGMENT TRAINING: CPT | Mod: GO | Performed by: OCCUPATIONAL THERAPIST

## 2022-06-08 PROCEDURE — 97110 THERAPEUTIC EXERCISES: CPT | Mod: GO | Performed by: OCCUPATIONAL THERAPIST

## 2022-06-08 PROCEDURE — 250N000013 HC RX MED GY IP 250 OP 250 PS 637: Performed by: PHYSICAL MEDICINE & REHABILITATION

## 2022-06-08 PROCEDURE — 97530 THERAPEUTIC ACTIVITIES: CPT | Mod: GP

## 2022-06-08 PROCEDURE — 97116 GAIT TRAINING THERAPY: CPT | Mod: GP

## 2022-06-08 PROCEDURE — 999N000150 HC STATISTIC PT MED CONFERENCE < 30 MIN

## 2022-06-08 PROCEDURE — 99233 SBSQ HOSP IP/OBS HIGH 50: CPT | Performed by: PHYSICIAN ASSISTANT

## 2022-06-08 PROCEDURE — 250N000013 HC RX MED GY IP 250 OP 250 PS 637: Performed by: PHYSICIAN ASSISTANT

## 2022-06-08 PROCEDURE — 128N000003 HC R&B REHAB

## 2022-06-08 PROCEDURE — 999N000125 HC STATISTIC PATIENT MED CONFERENCE < 30 MIN: Performed by: OCCUPATIONAL THERAPIST

## 2022-06-08 RX ORDER — LISINOPRIL 20 MG/1
20 TABLET ORAL DAILY
Status: DISCONTINUED | OUTPATIENT
Start: 2022-06-09 | End: 2022-06-10 | Stop reason: HOSPADM

## 2022-06-08 RX ADMIN — ACETAMINOPHEN 1000 MG: 500 TABLET ORAL at 06:11

## 2022-06-08 RX ADMIN — METHOCARBAMOL 1000 MG: 500 TABLET ORAL at 12:06

## 2022-06-08 RX ADMIN — IBUPROFEN 600 MG: 200 TABLET, FILM COATED ORAL at 15:56

## 2022-06-08 RX ADMIN — OXYCODONE HYDROCHLORIDE 15 MG: 5 TABLET ORAL at 20:26

## 2022-06-08 RX ADMIN — HYDROXYZINE HYDROCHLORIDE 25 MG: 25 TABLET, FILM COATED ORAL at 08:06

## 2022-06-08 RX ADMIN — HYDROXYZINE HYDROCHLORIDE 25 MG: 25 TABLET, FILM COATED ORAL at 04:22

## 2022-06-08 RX ADMIN — IBUPROFEN 600 MG: 200 TABLET, FILM COATED ORAL at 08:06

## 2022-06-08 RX ADMIN — FAMOTIDINE 20 MG: 20 TABLET ORAL at 20:21

## 2022-06-08 RX ADMIN — LISINOPRIL 10 MG: 10 TABLET ORAL at 08:05

## 2022-06-08 RX ADMIN — ENOXAPARIN SODIUM 40 MG: 40 INJECTION SUBCUTANEOUS at 08:07

## 2022-06-08 RX ADMIN — ACETAMINOPHEN 1000 MG: 500 TABLET ORAL at 13:03

## 2022-06-08 RX ADMIN — METHOCARBAMOL 1000 MG: 500 TABLET ORAL at 06:11

## 2022-06-08 RX ADMIN — METHOCARBAMOL 1000 MG: 500 TABLET ORAL at 15:56

## 2022-06-08 RX ADMIN — HYDROXYZINE HYDROCHLORIDE 25 MG: 25 TABLET, FILM COATED ORAL at 21:20

## 2022-06-08 RX ADMIN — IBUPROFEN 600 MG: 200 TABLET, FILM COATED ORAL at 21:20

## 2022-06-08 RX ADMIN — ACETAMINOPHEN 1000 MG: 500 TABLET ORAL at 21:20

## 2022-06-08 RX ADMIN — ENOXAPARIN SODIUM 40 MG: 40 INJECTION SUBCUTANEOUS at 20:21

## 2022-06-08 RX ADMIN — OXYCODONE HYDROCHLORIDE 15 MG: 5 TABLET ORAL at 12:06

## 2022-06-08 RX ADMIN — Medication 1 CHEW TAB: at 08:05

## 2022-06-08 RX ADMIN — GABAPENTIN 900 MG: 300 CAPSULE ORAL at 13:03

## 2022-06-08 RX ADMIN — OXYCODONE HYDROCHLORIDE 15 MG: 5 TABLET ORAL at 08:07

## 2022-06-08 RX ADMIN — GABAPENTIN 900 MG: 300 CAPSULE ORAL at 08:06

## 2022-06-08 RX ADMIN — OXYCODONE HYDROCHLORIDE 15 MG: 5 TABLET ORAL at 04:22

## 2022-06-08 RX ADMIN — GABAPENTIN 1200 MG: 300 CAPSULE ORAL at 21:19

## 2022-06-08 RX ADMIN — METHOCARBAMOL 1000 MG: 500 TABLET ORAL at 21:20

## 2022-06-08 RX ADMIN — HYDROXYZINE HYDROCHLORIDE 25 MG: 25 TABLET, FILM COATED ORAL at 13:03

## 2022-06-08 RX ADMIN — PANTOPRAZOLE SODIUM 40 MG: 40 TABLET, DELAYED RELEASE ORAL at 08:06

## 2022-06-08 ASSESSMENT — ACTIVITIES OF DAILY LIVING (ADL)
ADLS_ACUITY_SCORE: 39
ADLS_ACUITY_SCORE: 38
ADLS_ACUITY_SCORE: 39
ADLS_ACUITY_SCORE: 38
ADLS_ACUITY_SCORE: 38
ADLS_ACUITY_SCORE: 39

## 2022-06-08 NOTE — PLAN OF CARE
Acute Rehab Care Conference/Team Rounds      Type: Team Rounds    Present: Dr. Reza Maravilla, Bailey Mack PA, Olivia Lopez OT, Jameel Argueta PT, Val Vidal SW, Nori Rivera RD, Sonia Tanner RN and Patient Tere Turk.    Discharge Barriers/Treatment/Education    Rehab Diagnosis: Neurologic Conditions 03.9 Other Neurologic: s/p L4/5/S1 oblique lateral lumbar interbody fusion with discectomy    Active Medical Co-morbidities/Prognosis:   Patient Active Problem List   Diagnosis     Fusion of spine, lumbosacral region     S/P spinal fusion        Safety: A1 stand pivot for transfer, wheelchair based, calls appropriately, alert and oriented    Pain: Pain to back and bilateral extremities managed by scheduled and PRN medications, consistent in taking PRN medication for pain with PSR 8-9/10    Medications, Skin, Tubes/Lines: takes medication whole, bruising to right hip in healing stage, no lines/tubes    Swallowing/Nutrition:    Bowel/Bladder: Continent of Bladder, assisted to bathroom via wheelchair for toilet needs, LBM 6/6/2022    Psychosocial: . Lives in [place] with her spouse and dependent children. Supportive family. Stay at home mom. No financial concerns. Motivated and alert.     ADLs/IADLs: Pt completing w/c based ADLs. Pt able to complete pivot transfer with fww CGA, using roxana stedy when fatigued.  Pt requiring set up seated grooming, Min A spinal brace, Max A LB dressing, and Total A toileting. Pt completed 1 shower, using golvo lift to a dependent chair and needing max A with bathing. Goals to maximize IND and safety with ADLs, improve upright sitting tolerance, and obtain necessary equipment for home. Pt anxious to discharge home as soon as possible. Pt mom is supportive and able to provide 24/7 support - will need to schedule caregiver training. Recommend HC OT.     Mobility: Pt is making steady progress in PT. Recently progressed to gait in room CGA FWW to bathroom, ambulating 40' with FWW  at most CGA. LLE remains weak, so using step-to pattern. 0.09 m/s 10MWT, pace limited by pain. Needs to progress sitting tolerance for airplane ride home and stairs to enter home. Will need FWW at discharge, transport chair for community re-entry. HH PT. Plan for family training end of this week following stair progression with discharge next week.    Cognition/Language: No concerns. Not a barrier to discharge.    Community Re-Entry: Recommend assistance due to physical deficits and impaired upright sitting tolerance.    Transportation: Pt will need to arrange transport to airport    Decision maker: self    Plan of Care and goals reviewed and updated.    Discharge Plan/Recommendations    Fall Precautions: continue    Patient/Family input to goals: Yes    Anticipated rehab needs following discharge: Home with home care    Anticipated care giver support after discharge: Family    Estimated length of stay: 6/13/22    Overall plan for the patient: Continue IP Rehabilitation.       Utilization Review and Continued Stay Justification    Medical Necessity Criteria:    For any criteria that is not met, please document reason and plan for discharge, transfer, or modification of plan of care to address.    Requires intensive rehabilitation program to treat functional deficits?: Yes    Requires 3x per week or greater involvement of rehabilitation physician to oversee rehabilitation program?: Yes    Requires rehabilitation nursing interventions?: Yes    Patient is making functional progress?: Yes    There is a potential for additional functional progress? Yes    Patient is participating in therapy 3 hours per day a minimum of 5 days per week or 15 hours per week in 7 day period?:Yes    Has discharge needs that require coordinated discharge planning approach?:Yes      Final Physician Sign off    Statement of Approval: I approve the plan of care.     Patient Goals  Social Work Goals: Confirm discharge recommendations with  therapy, coordinate safe discharge plan and remain available to support and assist as needed.    OT Predicted Duration/Target Date for Goal Attainment: 06/24/22  Therapy Frequency (OT): Daily  OT: Hygiene/Grooming: modified independent, within precautions, from wheelchair  OT: Upper Body Dressing: Modified independent, including orthotic, within precautions, including set-up/clothing retrieval  OT: Lower Body Dressing: Modified independent, using adaptive equipment, within precautions, including set-up/clothing retrieval  OT: Upper Body Bathing: Modified independent, within precautions, using adaptive equipment  OT: Lower Body Bathing: Modified independent, using adaptive equipment, with precautions  OT: Bed Mobility: Modified independent, supine to/from sitting, within precautions  OT: Transfer: Modified independent, with assistive device, within precautions  OT: Toilet Transfer/Toileting: Modified independent, toilet transfer, cleaning and garment management  OT: Meal Preparation: Modified independent, with simple meal preparation, using adaptive equipment, within precautions  OT: Home Management: Modified independent, with light demand household tasks, within precautions    PT Predicted Duration/Target Date for Goal Attainment: 06/24/22  PT Frequency: Other (see comments) ( minutes daily)  PT: Bed Mobility: Completed  PT: Transfers: Modified independent, Assistive device, Within precautions, Bed to/from chair, Sit to/from stand (using FWW for home negotiation)  PT: Gait: Modified independent, Rolling walker, 50 feet (for household gait)  PT: Stairs: Minimal assist, 6 stairs, Rail on both sides (for home access)  PT: Goal 1: car transfer with MOD A or < in order to access personal transportation                                                          Patient Goal:  Pain Management: Pt will utilize all medication administration strategies to ensure participation in therapy while on ARU.     Goal: Medical  Management: Pt will be able to explain diagnosis and what precations need to be in place by time of discharge.                       Goal: Skin Integrity: Pt will utilize repositioning to ensure skin integrity while on ARU.                                               Goal Outcome Evaluation:

## 2022-06-08 NOTE — CARE PLAN
10 meter walk test:   Assistive device used: FWW     Patient score: 65 seconds= 0.09 m/s  <. 4 m/s indicates likely household ambulation  . 4-. 8 m/s indicates likely limited community ambulation  >. 8 m/s indicates likely community ambulation  <1.0 m/s indicates increased falls risk

## 2022-06-08 NOTE — PROGRESS NOTES
"  [facility]   Acute Rehabilitation Unit  Daily progress note    INTERVAL HISTORY  Tere Turk was seen sitting up in chair, has started to do some walking with therapy and nursing, remains limited by pain.  She is needing assist for dressing, bathing, toileting.  She has not yet attempted stairs.  Tere is eager to get home, this entails booking flight and would like to set date to do this.  Needs to complete family training and demonstrate some consistency with mobility prior to discharge home.      See rounds note by Dr. Maravilla for further details.     MEDICATIONS    acetaminophen  1,000 mg Oral Q8H     enoxaparin ANTICOAGULANT  40 mg Subcutaneous Q12H     famotidine  20 mg Oral QPM     gabapentin  1,200 mg Oral At Bedtime     gabapentin  900 mg Oral 2 times per day     GUMMY VITAMINS & MINERALS  1 chew tab Oral Daily     hydrOXYzine  25 mg Oral TID     ibuprofen  600 mg Oral TID     lidocaine  1-2 patch Transdermal Q24h    And     lidocaine   Transdermal Q8H PAULA     lisinopril  10 mg Oral Daily     methocarbamol  1,000 mg Oral 4x Daily     pantoprazole  40 mg Oral Daily        acetaminophen, famotidine, hydrOXYzine **OR** hydrOXYzine, naloxone **OR** naloxone **OR** naloxone **OR** naloxone, oxyCODONE IR, polyethylene glycol     PHYSICAL EXAM  BP (!) 142/67   Pulse 76   Temp 97.5  F (36.4  C) (Oral)   Resp 16   Ht 1.651 m (5' 5\")   Wt 148.3 kg (327 lb)   SpO2 94%   BMI 54.42 kg/m    Gen: awake alert nad  HEENT: mmm   Pulm: non labored  on room air.   Neuro/MSK: alert speech clear moves all extremities    LABS  CBC RESULTS:   Recent Labs   Lab Test 06/07/22  0522 06/06/22  0621 06/04/22  0534 06/02/22  0740 06/01/22  0717   WBC  --  14.4*  --  15.9* 15.2*   RBC  --  3.93  --  4.02 4.02   HGB  --  9.0*  --  9.1* 9.2*   HCT  --  30.4*  --  29.8* 31.1*   MCV  --  77*  --  74* 77*   MCH  --  22.9*  --  22.6* 22.9*   MCHC  --  29.6*  --  30.5* 29.6*   RDW  --  " 15.4*  --  15.0 15.1*    358 335 371 355     Last Basic Metabolic Panel:  Recent Labs   Lab Test 06/07/22  0522 06/06/22  0621 06/04/22  0534 06/02/22  0740 06/01/22  0717   NA  --  141  --  141 138   POTASSIUM  --  3.9  --  3.6 3.3*   CHLORIDE  --  110*  --  108 107   CO2  --  27  --  27 27   ANIONGAP  --  4  --  6 4   GLC  --  102*  --  108* 101*   BUN  --  12  --  12 11   CR 0.71 0.56 0.54 0.58 0.62   GFRESTIMATED >90 >90 >90 >90 >90   SURENDRA  --  8.6  --  9.0 8.6       Rehabilitation - continue comprehensive acute inpatient rehabilitation program with multidisciplinary approach including therapies, rehab nursing, and physiatry following. See interval history for updates.      ASSESSMENT AND PLAN    Tere Turk is a 36 year old woman with past medical history of HTN, WPW s/p ablation 2014, DEREK, GERD, PCOS, Obesity, and chronic back pain admitted for planned L4/5/s1 interbody fusion with discectomy 5/27/22.  Admitted with impaired strength, impaired activity tolerance and impaired balane 6/1/22.     L5-S1 grade 2 spondylolistheseis,   L4/5 DDD  S/p L4/5/S1 oblique lateral lumbar interbody fusion with discectomy 5/27 per Dr. Ling.   -continue PT/OT  -WBAT  -No Bending, Twisting, climbing, Crawling, No lifting more than 8 lb for 2 weeks, or 15 lb for 2 months or 25 lb for 4 months or 35 lb for 6 months  -continue Brace for 3 months out of bed, 6 months in the car   -pain: increased to 900 mg bid and 1200 mg at bedtime.   -continue ibuprofen (started 6/6/22)   -continue prn oxycodone 10-15 mg q 4 hours, continue scheduled robaxin and scheduled and prn tylenol , atarax  - continue ice/heat per patient preference.   -daily dressing changes  -follow up wound check 2 weeks (pcp or neurosurgery), follow up neurosurgery in one month         HTN  pta on lisinopril/hctz  -increase lisinopril to 20 mg daily 6/9  -consider addition of hydrochlorothiazide pending bp.      GERD  -continue pta ppi  -given reported  worsening symptoms continue pepcid at bedtime and daily prn  -continue to monitor     LAZARA (resolved)   Cr 1.17 5/30 in setting of uti.  Cr stable 0.71 6/7.    -encourage fluids     Leucocytosis  Felt 2/2 UTI slow down trend 14.4 6/6.   -trend     Acute blood loss anemia  Hgb stable 9.0 6/6 pre op 11.3  -trend.      Hypokalemia (resolved)   K 3.9 6/6. received 20 mEQ k 6/1, 6/2  -trend     DEREK  -encourage home bipap use at bedtime      Obesity  PCOS   pta on metformin 750 mg bid  -follow up pcp to restart metformin as outpatient.      Right lateral buttock wounds   Was seen by WOCN 5/31. Continue wound care - Leave open to air unless areas start draining       1. Adjustment to disability:  Monitor mood  2. FEN: reg  3. Bowel: monitor  4. Bladder: monitoro  5. DVT Prophylaxis: lovenox  6. GI Prophylaxis: ppi + pepcid  7. Code: full  8. Disposition: goal for home   9. ELOS: therapy evaluations today.   10. Follow up Appointments on Discharge: pcp, neurosurgery.           Bailey Mack PA-C  Physical Medicine & Rehabilitation       Seen & discussed with     I spent a total of 35 minutes face-to-face or managing the care of Tere Turk. Over 50% of my time on the unit was spent counseling the patient and coordinating care. See note for details.

## 2022-06-08 NOTE — PROGRESS NOTES
Discharge Planner Post-Acute Rehab OT:     Discharge Plan: home in [place] with family A, HCOT    Precautions: falls, impaired BLE sensation, spinal - no bending, twisting or lifting >8# until 6/10, TLSO OOB     Current Status:  ADLs:    Mobility: CGA FWW, w/c longer distance    Grooming: IND seated.    Dressing: UB - Set up seated including TLSO. LB - Mod A AE.    Bathing: Max A shower bench.     Toileting: CGA SPT FWW. Min A clothing management. Assist hygiene.  IADLs: IND PTA, including driving and childcare.   Vision/Cognition: No concerns, not a barrier to discharge.    Assessment: Rounds meetings today. Pt making good progress, advanced to ambulatory ADLs. Pt very motivated to return home as soon as possible. Moved up discharge to 6/13. Scheduled caregiver training with pt mother for 6/9. Discussed plans for obtaining equipment for home. Remaining IP goals to build sitting tolerance, consider mobility adaptations for travel, and continue to build IND and durability for ADLs.    Other Barriers to Discharge (DME, Family Training, etc):   Home set up: Lives in [place], [address]. Needs to tolerate upright position for multiple hours and functional transfers required for flight home. Mother Kemi in [place] for family training and assist. Lives with spouse Fernando and son Isak (4 y.o) with special needs. Parents moved to [place] to assist as needed once home.  Family training - 6/9/22 10:30-12p  DME - Pt has adjustable bed, HHSH, combination shower chair/OTC, reacher, raised toilet seat option. Plans to purchase leg  and bidet. Needs transport chair and fww.

## 2022-06-08 NOTE — PLAN OF CARE
"Discharge Planner Post-Acute Rehab PT:     Discharge Plan: home with assist, home PT    Precautions: falls, spinal, LSO when OOB    Current Status:  Bed Mobility: IND  Transfer: CGA stand/pivot FWW  Gait: CGA FWW ~40', limited by pain  Stairs: CGA 6\"x4 B rail  Balance: good sitting balance, support in standing for pain    Assessment: Moving discharge up to Friday per pt perference, understands she will need assist and not be mod-I at this time. Family training tomorrow. Provided transport chair resources vs considering working with HH to setup a rental while in [place].    Other Barriers to Discharge (DME, Family Training, etc):   FWW  Family training - Thursday  "

## 2022-06-08 NOTE — PROGRESS NOTES
Team rounds this morning. Planning to discharge on Mon 06/13/2022. Pt making arrangements to fly back to [place] right away. Therapy will set up caregiver training with mom prior to discharge. HC recommended. Unsure if this writer will be able to set up since pt crossing state lines. Pt in agreement with plan, denied preference for HC, and aware that SW will f/u with updates as able.     SWer called [employer] insurance RN SONY Nazario PH: [phone]. Bella is going to email a list of HC agencies in-network and close to pt home. SWer will contact HC agencies and see about setting something up at discharge. Will continue to follow.     ALETHEA Atkins   [facility]   Direct Phone: [phone]  I   Pager: [phone]  I  Fax: [phone]

## 2022-06-08 NOTE — PLAN OF CARE
Goal Outcome Evaluation:        Patient alert and oriented x4. A1/CGA with walker. Regular diet, thins, pills whole. Continent of bowel and bladder, LBM: 6/7. Pt c/o pain in lower back and BLE, given prn oxy at 0800 and 1200. Spinal incisions approximated, BRIEN with steri strips. Rounds completed today on pt, discharge set for 6/14. Asymptomatic covid swab done, awaiting results. Will continue with POC.

## 2022-06-09 ENCOUNTER — APPOINTMENT (OUTPATIENT)
Dept: OCCUPATIONAL THERAPY | Facility: CLINIC | Age: 37
DRG: 560 | End: 2022-06-09
Attending: PHYSICAL MEDICINE & REHABILITATION
Payer: COMMERCIAL

## 2022-06-09 ENCOUNTER — APPOINTMENT (OUTPATIENT)
Dept: PHYSICAL THERAPY | Facility: CLINIC | Age: 37
DRG: 560 | End: 2022-06-09
Attending: PHYSICAL MEDICINE & REHABILITATION
Payer: COMMERCIAL

## 2022-06-09 LAB
ANION GAP SERPL CALCULATED.3IONS-SCNC: 7 MMOL/L (ref 3–14)
BUN SERPL-MCNC: 13 MG/DL (ref 7–30)
CALCIUM SERPL-MCNC: 9 MG/DL (ref 8.5–10.1)
CHLORIDE BLD-SCNC: 107 MMOL/L (ref 94–109)
CO2 SERPL-SCNC: 27 MMOL/L (ref 20–32)
CREAT SERPL-MCNC: 0.59 MG/DL (ref 0.52–1.04)
ERYTHROCYTE [DISTWIDTH] IN BLOOD BY AUTOMATED COUNT: 15.5 % (ref 10–15)
GFR SERPL CREATININE-BSD FRML MDRD: >90 ML/MIN/1.73M2
GLUCOSE BLD-MCNC: 99 MG/DL (ref 70–99)
HCT VFR BLD AUTO: 30.8 % (ref 35–47)
HGB BLD-MCNC: 9.2 G/DL (ref 11.7–15.7)
MCH RBC QN AUTO: 22.6 PG (ref 26.5–33)
MCHC RBC AUTO-ENTMCNC: 29.9 G/DL (ref 31.5–36.5)
MCV RBC AUTO: 76 FL (ref 78–100)
PLATELET # BLD AUTO: 350 10E3/UL (ref 150–450)
POTASSIUM BLD-SCNC: 4.3 MMOL/L (ref 3.4–5.3)
RBC # BLD AUTO: 4.07 10E6/UL (ref 3.8–5.2)
SODIUM SERPL-SCNC: 141 MMOL/L (ref 133–144)
WBC # BLD AUTO: 13.5 10E3/UL (ref 4–11)

## 2022-06-09 PROCEDURE — 250N000013 HC RX MED GY IP 250 OP 250 PS 637: Performed by: PHYSICIAN ASSISTANT

## 2022-06-09 PROCEDURE — 250N000013 HC RX MED GY IP 250 OP 250 PS 637: Performed by: PHYSICAL MEDICINE & REHABILITATION

## 2022-06-09 PROCEDURE — 36415 COLL VENOUS BLD VENIPUNCTURE: CPT | Performed by: PHYSICIAN ASSISTANT

## 2022-06-09 PROCEDURE — 250N000011 HC RX IP 250 OP 636: Performed by: PHYSICIAN ASSISTANT

## 2022-06-09 PROCEDURE — 80048 BASIC METABOLIC PNL TOTAL CA: CPT | Performed by: PHYSICIAN ASSISTANT

## 2022-06-09 PROCEDURE — 99233 SBSQ HOSP IP/OBS HIGH 50: CPT | Performed by: PHYSICIAN ASSISTANT

## 2022-06-09 PROCEDURE — 97535 SELF CARE MNGMENT TRAINING: CPT | Mod: GO

## 2022-06-09 PROCEDURE — 85027 COMPLETE CBC AUTOMATED: CPT | Performed by: PHYSICIAN ASSISTANT

## 2022-06-09 PROCEDURE — 97530 THERAPEUTIC ACTIVITIES: CPT | Mod: GP | Performed by: STUDENT IN AN ORGANIZED HEALTH CARE EDUCATION/TRAINING PROGRAM

## 2022-06-09 PROCEDURE — 97110 THERAPEUTIC EXERCISES: CPT | Mod: GP | Performed by: STUDENT IN AN ORGANIZED HEALTH CARE EDUCATION/TRAINING PROGRAM

## 2022-06-09 PROCEDURE — 128N000003 HC R&B REHAB

## 2022-06-09 RX ORDER — ACETAMINOPHEN 500 MG
500-1000 TABLET ORAL EVERY 6 HOURS PRN
Qty: 100 TABLET | Refills: 0 | Status: SHIPPED | OUTPATIENT
Start: 2022-06-09

## 2022-06-09 RX ORDER — METHOCARBAMOL 500 MG/1
1000 TABLET, FILM COATED ORAL 4 TIMES DAILY
Qty: 120 TABLET | Refills: 0 | Status: SHIPPED | OUTPATIENT
Start: 2022-06-09

## 2022-06-09 RX ORDER — BENZOCAINE/MENTHOL 6 MG-10 MG
LOZENGE MUCOUS MEMBRANE 2 TIMES DAILY PRN
Status: DISCONTINUED | OUTPATIENT
Start: 2022-06-09 | End: 2022-06-10 | Stop reason: HOSPADM

## 2022-06-09 RX ORDER — BENZOCAINE/MENTHOL 6 MG-10 MG
LOZENGE MUCOUS MEMBRANE 2 TIMES DAILY PRN
Qty: 15 G | Refills: 0 | Status: SHIPPED | OUTPATIENT
Start: 2022-06-09

## 2022-06-09 RX ORDER — OXYCODONE HYDROCHLORIDE 10 MG/1
10-15 TABLET ORAL EVERY 4 HOURS PRN
Qty: 30 TABLET | Refills: 0 | Status: SHIPPED | OUTPATIENT
Start: 2022-06-09

## 2022-06-09 RX ORDER — METFORMIN HYDROCHLORIDE 750 MG/1
750 TABLET, EXTENDED RELEASE ORAL 2 TIMES DAILY WITH MEALS
COMMUNITY
Start: 2022-06-09

## 2022-06-09 RX ORDER — GABAPENTIN 300 MG/1
CAPSULE ORAL
Qty: 300 CAPSULE | Refills: 0 | Status: SHIPPED | OUTPATIENT
Start: 2022-06-09

## 2022-06-09 RX ORDER — HYDROXYZINE HYDROCHLORIDE 50 MG/1
25-50 TABLET, FILM COATED ORAL EVERY 6 HOURS PRN
Qty: 125 TABLET | Refills: 0 | Status: SHIPPED | OUTPATIENT
Start: 2022-06-09

## 2022-06-09 RX ORDER — FAMOTIDINE 20 MG/1
20 TABLET, FILM COATED ORAL 2 TIMES DAILY PRN
Qty: 30 TABLET | Refills: 0 | Status: SHIPPED | OUTPATIENT
Start: 2022-06-09

## 2022-06-09 RX ORDER — LISINOPRIL 20 MG/1
20 TABLET ORAL DAILY
Qty: 30 TABLET | Refills: 0 | Status: SHIPPED | OUTPATIENT
Start: 2022-06-10

## 2022-06-09 RX ORDER — IBUPROFEN 600 MG/1
600 TABLET, FILM COATED ORAL 3 TIMES DAILY PRN
Qty: 60 TABLET | Refills: 0 | Status: SHIPPED | OUTPATIENT
Start: 2022-06-09

## 2022-06-09 RX ORDER — PANTOPRAZOLE SODIUM 40 MG/1
40 TABLET, DELAYED RELEASE ORAL DAILY
Qty: 30 TABLET | Refills: 0 | Status: SHIPPED | OUTPATIENT
Start: 2022-06-09

## 2022-06-09 RX ADMIN — METHOCARBAMOL 1000 MG: 500 TABLET ORAL at 16:01

## 2022-06-09 RX ADMIN — METHOCARBAMOL 1000 MG: 500 TABLET ORAL at 06:00

## 2022-06-09 RX ADMIN — GABAPENTIN 1200 MG: 300 CAPSULE ORAL at 21:10

## 2022-06-09 RX ADMIN — METHOCARBAMOL 1000 MG: 500 TABLET ORAL at 12:34

## 2022-06-09 RX ADMIN — IBUPROFEN 600 MG: 200 TABLET, FILM COATED ORAL at 16:01

## 2022-06-09 RX ADMIN — IBUPROFEN 600 MG: 200 TABLET, FILM COATED ORAL at 09:55

## 2022-06-09 RX ADMIN — OXYCODONE HYDROCHLORIDE 15 MG: 5 TABLET ORAL at 16:57

## 2022-06-09 RX ADMIN — HYDROXYZINE HYDROCHLORIDE 25 MG: 25 TABLET, FILM COATED ORAL at 13:38

## 2022-06-09 RX ADMIN — OXYCODONE HYDROCHLORIDE 15 MG: 5 TABLET ORAL at 03:30

## 2022-06-09 RX ADMIN — ACETAMINOPHEN 1000 MG: 500 TABLET ORAL at 21:09

## 2022-06-09 RX ADMIN — PANTOPRAZOLE SODIUM 40 MG: 40 TABLET, DELAYED RELEASE ORAL at 08:27

## 2022-06-09 RX ADMIN — HYDROXYZINE HYDROCHLORIDE 25 MG: 25 TABLET, FILM COATED ORAL at 08:27

## 2022-06-09 RX ADMIN — HYDROXYZINE HYDROCHLORIDE 25 MG: 25 TABLET, FILM COATED ORAL at 22:23

## 2022-06-09 RX ADMIN — Medication 1 CHEW TAB: at 08:27

## 2022-06-09 RX ADMIN — IBUPROFEN 600 MG: 200 TABLET, FILM COATED ORAL at 22:23

## 2022-06-09 RX ADMIN — METHOCARBAMOL 1000 MG: 500 TABLET ORAL at 21:09

## 2022-06-09 RX ADMIN — GABAPENTIN 900 MG: 300 CAPSULE ORAL at 08:27

## 2022-06-09 RX ADMIN — OXYCODONE HYDROCHLORIDE 15 MG: 5 TABLET ORAL at 08:27

## 2022-06-09 RX ADMIN — ENOXAPARIN SODIUM 40 MG: 40 INJECTION SUBCUTANEOUS at 08:26

## 2022-06-09 RX ADMIN — ACETAMINOPHEN 1000 MG: 500 TABLET ORAL at 06:00

## 2022-06-09 RX ADMIN — OXYCODONE HYDROCHLORIDE 15 MG: 5 TABLET ORAL at 12:36

## 2022-06-09 RX ADMIN — ENOXAPARIN SODIUM 40 MG: 40 INJECTION SUBCUTANEOUS at 21:09

## 2022-06-09 RX ADMIN — OXYCODONE HYDROCHLORIDE 15 MG: 5 TABLET ORAL at 21:12

## 2022-06-09 RX ADMIN — FAMOTIDINE 20 MG: 20 TABLET ORAL at 21:10

## 2022-06-09 RX ADMIN — GABAPENTIN 900 MG: 300 CAPSULE ORAL at 13:37

## 2022-06-09 RX ADMIN — ACETAMINOPHEN 1000 MG: 500 TABLET ORAL at 13:37

## 2022-06-09 RX ADMIN — LISINOPRIL 20 MG: 20 TABLET ORAL at 08:27

## 2022-06-09 ASSESSMENT — ACTIVITIES OF DAILY LIVING (ADL)
ADLS_ACUITY_SCORE: 38

## 2022-06-09 NOTE — DISCHARGE SUMMARY
"    [facility]   Acute Rehabilitation Unit  Discharge summary     Date of Admission: 6/1/2022  Date of Discharge: 6/10/2022   Disposition: home  Primary Care Physician: System, Provider Not In  Attending physician: Reza Maravilla MD    DISCHARGE DIAGNOSIS  L5-S1 grade 2 spondylolisthesis  L4-5 DDD  S/p L4/5/S1 fusion with discectomy 5/27   Impaired strength, impaired activity tolerance, impaired balance  HTN  GERD  Leucocytosis  Acute blood loss anemia  DEREK  Obesity  PCOS    BRIEF SUMMARY  Tere Turk is a 36 year old woman with past medical history of HTN, WPW s/p ablation 2014, DEREK, GERD, PCOS, Obesity, and chronic back pain admitted for planned L4/5/s1 interbody fusion with discectomy 5/27/22.  Admitted with impaired strength, impaired activity tolerance and impaired balane 6/1/22.     REHABILITATION COURSE    Discharge Plan: home with assist, home PT     Precautions: falls, spinal, LSO when OOB     Current Status:  Bed Mobility: MOD I  Transfer: CGA stand/pivot FWW  Gait: CGA FWW ~40', limited by pain  Stairs: CGA 6\"x4 B rail (stairs at home 7', unable to simulate here)  Balance: good sitting balance, support in standing for pain     Assessment: Moving discharge up to Friday per pt perference, understands she will need assist and not be mod-I at this time. Provided transport chair resources, strongly encouraged pt to consider purchasing as soon as possible vs waiting to work with HH to setup a manual wc rental while in [place]- d/t insurance issues needing to establish visit with PCP to get referral for home PT.     Other Barriers to Discharge (DME, Family Training, etc):   FWW and gait belt issued 6/9  Family training - completed 6/9. Educated pt and family to have Ax2 for [place] home    OT:        Discharge Planner Post-Acute Rehab OT:      Discharge Plan: home in [place] with family A, HCOT     Precautions: falls, impaired BLE sensation, spinal - no bending, twisting or " lifting >8# until 6/10, TLSO OOB      Current Status:  ADLs:    Mobility: CGA FWW, w/c longer distance    Grooming: IND seated.    Dressing: UB - Set up seated including TLSO. LB - Mod A AE.    Bathing: sba extended tub bench     cga transfer to extended tub bench.     Toileting: CGA SPT FWW. Min A clothing management. Assist hygiene.  IADLs: IND PTA, including driving and childcare.   Vision/Cognition: No concerns, not a barrier to discharge.     Assessment:ot family trainnig with standard w/c no cushion to simulate basic w/c for airposrt transport , discussed and educated in tchnique for using bathroom and  options for getting into seat of plane, pt able to simulate side stepping into airplane seat  with 2, 2 seat couches one in front of the other and pt holding back of front couch to side set pt able to sit 16 inches and stand with using back of front couch, pt and mother able to verbalize understanding of techniques  Other Barriers to Discharge (DME, Family Training, etc):   Home set up: Lives in [place], 6 [place]. Needs to tolerate upright position for multiple hours and functional transfers required for flight home. Mother Kemi in [place] for family training and assist. Lives with spouse Fernando and son Isak (4 y.o) with special needs. Parents moved to [place] to assist as needed once home.  Family training - 6/9/22 10:30-12p completed  DME - Pt has adjustable bed, HHSH, combination shower chair/OTC, reacher, raised toilet seat option. Plans to purchase leg  and bidet. Needs transport chair and fww.             MEDICAL COURSE  L5-S1 grade 2 spondylolistheseis,   L4/5 DDD  S/p L4/5/S1 oblique lateral lumbar interbody fusion with discectomy 5/27 per Dr. Ling.   -continue PT/OT- recommendation for home care upon discharge.   -WBAT, No Bending, Twisting, climbing, Crawling, No lifting more than 8 lb for 2 weeks, or 15 lb for 2 months or 25 lb for 4 months or 35 lb for 6 months  -continue Brace for 3 months out of bed,  6 months in the car   -pain: continue gabapentin 900 mg bid and 1200 mg at bedtime.  ibuprofen (started 6/6/22)    prn oxycodone 10-15 mg q 4 hours( with ongoing encouragement to taper), robaxin and  Tylenol and , atarax   follow up neurosurgery in one month        HTN  pta on lisinopril/hctz  -will discharge home on lisinopril to 20 mg daily dose increased 6/9  -follow up pcp     GERD  -continue pta ppi  -given reported worsening symptoms continue pepcid prn    LAZARA (resolved)   Cr 1.17 5/30 in setting of uti.  Cr stable 0.59 6/9/22.      Leucocytosis  Felt 2/2 UTI slow down trend 14.4 6/6. -->13.5 6/9     Acute blood loss anemia  Hgb stable 9.2 6/9.  pre op 11.3        DEREK  -encourage home bipap use at bedtime      Obesity  PCOS   pta on metformin 750 mg bid  -follow up pcp to restart metformin as outpatient.     DISCHARGE MEDICATIONS  Current Discharge Medication List      START taking these medications    Details   famotidine (PEPCID) 20 MG tablet Take 1 tablet (20 mg) by mouth 2 times daily as needed (reflux.)  Qty: 30 tablet, Refills: 0    Associated Diagnoses: Gastroesophageal reflux disease with esophagitis, unspecified whether hemorrhage      hydrocortisone (CORTAID) 1 % external cream Apply topically 2 times daily as needed for itching or rash  Qty: 15 g, Refills: 0    Associated Diagnoses: Allergic contact dermatitis due to adhesives      ibuprofen (ADVIL/MOTRIN) 600 MG tablet Take 1 tablet (600 mg) by mouth 3 times daily as needed for mild pain  Qty: 60 tablet, Refills: 0    Associated Diagnoses: Fusion of spine, lumbosacral region      lisinopril (ZESTRIL) 20 MG tablet Take 1 tablet (20 mg) by mouth daily  Qty: 30 tablet, Refills: 0    Associated Diagnoses: Benign essential hypertension      Pediatric Multivit-Minerals-C (GUMMY VITAMINS & MINERALS) chewable tablet Take 1 chew tab by mouth daily    Associated Diagnoses: Fusion of spine, lumbosacral region         CONTINUE these medications which have  CHANGED    Details   acetaminophen (TYLENOL) 500 MG tablet Take 1-2 tablets (500-1,000 mg) by mouth every 6 hours as needed for mild pain  Qty: 100 tablet, Refills: 0    Associated Diagnoses: Fusion of spine, lumbosacral region      gabapentin (NEURONTIN) 300 MG capsule Take 900 mg (3 capsules) by mouth every am and midday, take 1200 mg (4 capsules) every night before bed.  Qty: 300 capsule, Refills: 0    Associated Diagnoses: Fusion of spine, lumbosacral region      hydrOXYzine (ATARAX) 50 MG tablet Take 0.5-1 tablets (25-50 mg) by mouth every 6 hours as needed for other, itching or anxiety (adjuvant pain)  Qty: 125 tablet, Refills: 0    Associated Diagnoses: Fusion of spine, lumbosacral region      metFORMIN (GLUCOPHAGE-XR) 750 MG 24 hr tablet Take 1 tablet (750 mg) by mouth 2 times daily (with meals)    Comments: Resume per pcp upon follow up      methocarbamol (ROBAXIN) 500 MG tablet Take 2 tablets (1,000 mg) by mouth 4 times daily  Qty: 120 tablet, Refills: 0    Associated Diagnoses: Fusion of spine, lumbosacral region      oxyCODONE (ROXICODONE) 10 MG tablet Take 1-1.5 tablets (10-15 mg) by mouth every 4 hours as needed for severe pain or breakthrough pain ((pain rating 7-10) continue to taper off this medication.  further refills per outpatient team.)  Qty: 30 tablet, Refills: 0    Associated Diagnoses: Fusion of spine, lumbosacral region      pantoprazole (PROTONIX) 40 MG EC tablet Take 1 tablet (40 mg) by mouth daily  Qty: 30 tablet, Refills: 0    Associated Diagnoses: Gastroesophageal reflux disease with esophagitis, unspecified whether hemorrhage         STOP taking these medications       cefdinir (OMNICEF) 300 MG capsule Comments:   Reason for Stopping:         lisinopril-hydrochlorothiazide (ZESTORETIC) 20-12.5 MG tablet Comments:   Reason for Stopping:         naproxen sodium (ANAPROX) 220 MG tablet Comments:   Reason for Stopping:                 DISCHARGE INSTRUCTIONS AND FOLLOW UP  Discharge  Procedure Orders   Home Care Referral   Referral Priority: Routine: Next available opening Referral Type: Home Health Therapies & Aides   Number of Visits Requested: 1     Reason for your hospital stay   Order Comments: Admitted for rehabilitation following posterior spinal fusion     Activity   Order Comments: Your activity upon discharge:   No Bending, Twisting, climbing, Crawling,   No lifting more than 8 lb for 2 weeks, or 15 lb for 2 months or 25 lb for 4 months or 35 lb for 6 months  Brace for 3 months out of bed,6 months in the car     Order Specific Question Answer Comments   Is discharge order? Yes      Adult [facility] Follow-up and recommended labs and tests   Order Comments: Follow up with primary care provider, within 7 days for hospital follow- up.    Follow up neurosurgery- within 2 weeks.       Appointments on [place] and/or [facility] (with [facility] or [facility] provider or service). Call [phone] if you haven't heard regarding these appointments within 7 days of discharge.     BIPAP for stable sleep apnea with home equipment settings   Standing Status: Future Standing Exp. Date: 06/09/23   Order Comments: Continue home bipap at bedtime.     Diet   Order Comments: Follow this diet upon discharge:  Regular Diet     Order Specific Question Answer Comments   Is discharge order? Yes           PHYSICAL EXAMINATION    Most recent Vital Signs:   Vitals:    06/08/22 0805 06/08/22 1536 06/08/22 1553 06/09/22 0823   BP: (!) 142/67  129/54 (!) 145/74   BP Location:   Right arm Right arm   Patient Position:   Sitting Sitting   Cuff Size:    Adult Large   Pulse: 76  89 96   Resp: 16  20 18   Temp: 97.5  F (36.4  C)  98.3  F (36.8  C) 98.4  F (36.9  C)   TempSrc: Oral  Oral Oral   SpO2: 94%  94% 94%   Weight:  146.2 kg (322 lb 4.8 oz)     Height:         Alert, sitting in recliner, pleasant  CTA  S1S2 RRR  Soft non tender abdomen.  Moves all four.  Incision CDI    HB 9.2    WBC 13.5 trending down.    Cr 0.59  35  minutes spent in discharge, including >50% in counseling and coordination of care, medication review and plan of care recommended on follow up.     Patient was evaluated on day of discharge by attending physician, Reza Maravilla MD, who agrees with plan of care.    Discharge summary was forwarded to System, Provider Not In (PCP) at the time of discharge, so as to bridge from hospital to outpatient care.     It was our pleasure to care for Tere Turk during this hospitalization. Please do not hesitate to contact me should there be questions regarding the hospital course or discharge plan.      Note partially prepared by: Bailey Maravilla MD

## 2022-06-09 NOTE — PROGRESS NOTES
"  [facility]   Acute Rehabilitation Unit  Daily progress note    INTERVAL HISTORY  Tere Turk was seen following shower this am. Says back has been itchy, examined incisions cdi without redness/ swelling warmth or drainage, steri strips wet with limited ongoing stick to skin though significant dermatitis beneath, given this steri strips were removed.  Patient has made flight for home for tomorrow afternoon.  We reviewed medications and recommended follow up.  She requested that discharge orders etc be sent to primary care and neurosurgery, unit secretary to obtain release of information and fax this information.      Neurosurgery PA also called to notify of upcoming discharge, epic inbox sent to Dr. Ling,  and to call if questions/recs/concerns.     Medications reviewed and filled 30 day supply at [facility] with exception of opioids Tere was given some per neurosurgery and will give additional tabs to provide for until follow up outpatient.  She will need to follow up primary care upon return to [place] to get orders/ set up home care for ongoing therapy.  Patient's mom completed family training today.      PT:   Bed Mobility: IND  Transfer: CGA stand/pivot FWW  Gait: CGA FWW ~40', limited by pain  Stairs: CGA 6\"x4 B rail  Balance: good sitting balance, support in standing for pain     Assessment: Moving discharge up to Friday per pt perference, understands she will need assist and not be mod-I at this time. Family training tomorrow. Provided transport chair resources vs considering working with HH to setup a rental while in [place].    OT:   ADLs:    Mobility: CGA FWW, w/c longer distance    Grooming: IND seated.    Dressing: UB - Set up seated including TLSO. LB - Mod A AE.    Bathing: Max A shower bench.     Toileting: CGA SPT FWW. Min A clothing management. Assist hygiene.  IADLs: IND PTA, including driving and childcare.   Vision/Cognition: No concerns, not a barrier to " "discharge.     Assessment: Rounds meetings today. Pt making good progress, advanced to ambulatory ADLs. Pt very motivated to return home as soon as possible. Moved up discharge to 6/13. Scheduled caregiver training with pt mother for 6/9. Discussed plans for obtaining equipment for home. Remaining IP goals to build sitting tolerance, consider mobility adaptations for travel, and continue to build IND and durability for ADLs.    MEDICATIONS    acetaminophen  1,000 mg Oral Q8H     enoxaparin ANTICOAGULANT  40 mg Subcutaneous Q12H     famotidine  20 mg Oral QPM     gabapentin  1,200 mg Oral At Bedtime     gabapentin  900 mg Oral 2 times per day     GUMMY VITAMINS & MINERALS  1 chew tab Oral Daily     hydrOXYzine  25 mg Oral TID     ibuprofen  600 mg Oral TID     lidocaine  1-2 patch Transdermal Q24h    And     lidocaine   Transdermal Q8H PAULA     lisinopril  20 mg Oral Daily     methocarbamol  1,000 mg Oral 4x Daily     pantoprazole  40 mg Oral Daily        acetaminophen, famotidine, hydrOXYzine **OR** hydrOXYzine, naloxone **OR** naloxone **OR** naloxone **OR** naloxone, oxyCODONE IR, polyethylene glycol     PHYSICAL EXAM  /54 (BP Location: Right arm, Patient Position: Sitting)   Pulse 89   Temp 98.3  F (36.8  C) (Oral)   Resp 20   Ht 1.651 m (5' 5\")   Wt 146.2 kg (322 lb 4.8 oz)   SpO2 94%   BMI 53.63 kg/m    Gen: awake alert nad  HEENT: mmm   Pulm: non labored  on room air.   Neuro/MSK: alert speech clear moves all extremities  Skin: incisions cdi with surrounding adhesive dermatitis.         LABS  CBC RESULTS:   Recent Labs   Lab Test 06/09/22  0549 06/07/22  0522 06/06/22  0621 06/04/22  0534 06/02/22  0740   WBC 13.5*  --  14.4*  --  15.9*   RBC 4.07  --  3.93  --  4.02   HGB 9.2*  --  9.0*  --  9.1*   HCT 30.8*  --  30.4*  --  29.8*   MCV 76*  --  77*  --  74*   MCH 22.6*  --  22.9*  --  22.6*   MCHC 29.9*  --  29.6*  --  30.5*   RDW 15.5*  --  15.4*  --  15.0    328 358   < > 371    < > = " values in this interval not displayed.     Last Basic Metabolic Panel:  Recent Labs   Lab Test 06/09/22  0549 06/07/22  0522 06/06/22  0621 06/04/22  0534 06/02/22  0740     --  141  --  141   POTASSIUM 4.3  --  3.9  --  3.6   CHLORIDE 107  --  110*  --  108   CO2 27  --  27  --  27   ANIONGAP 7  --  4  --  6   GLC 99  --  102*  --  108*   BUN 13  --  12  --  12   CR 0.59 0.71 0.56   < > 0.58   GFRESTIMATED >90 >90 >90   < > >90   SURENDRA 9.0  --  8.6  --  9.0    < > = values in this interval not displayed.       Rehabilitation - continue comprehensive acute inpatient rehabilitation program with multidisciplinary approach including therapies, rehab nursing, and physiatry following. See interval history for updates.      ASSESSMENT AND PLAN    Tere Turk is a 36 year old woman with past medical history of HTN, WPW s/p ablation 2014, DEREK, GERD, PCOS, Obesity, and chronic back pain admitted for planned L4/5/s1 interbody fusion with discectomy 5/27/22.  Admitted with impaired strength, impaired activity tolerance and impaired balane 6/1/22.     L5-S1 grade 2 spondylolistheseis,   L4/5 DDD  S/p L4/5/S1 oblique lateral lumbar interbody fusion with discectomy 5/27 per Dr. Ling.   -continue PT/OT  -WBAT  -No Bending, Twisting, climbing, Crawling, No lifting more than 8 lb for 2 weeks, or 15 lb for 2 months or 25 lb for 4 months or 35 lb for 6 months  -continue Brace for 3 months out of bed, 6 months in the car   -pain: increased to 900 mg bid and 1200 mg at bedtime.   -continue ibuprofen (started 6/6/22)   -continue prn oxycodone 10-15 mg q 4 hours, continue scheduled robaxin and scheduled and prn tylenol , atarax  - continue ice/heat per patient preference.   -daily dressing changes  -follow up wound check 2 weeks (pcp or neurosurgery), follow up neurosurgery in one month    - 6/9: neurosurgery clinic called to update on  plan for discharge 6/10. And notified of incomplete discharge summary, additionally epic inbox  sent to provider.      HTN  pta on lisinopril/hctz  -increase lisinopril to 20 mg daily 6/9  -consider addition of hydrochlorothiazide pending bp.   -follow up pcp     GERD  -continue pta ppi  -given reported worsening symptoms continue pepcid at bedtime and daily prn  -continue to monitor     LAZARA (resolved)   Cr 1.17 5/30 in setting of uti.  Cr stable 0.59 6/9/22.      Leucocytosis  Felt 2/2 UTI slow down trend 14.4 6/6. -->13.5 6/9  -trend     Acute blood loss anemia  Hgb stable 9.2 6/9.  pre op 11.3       DEREK  -encourage home bipap use at bedtime      Obesity  PCOS   pta on metformin 750 mg bid  -follow up pcp to restart metformin as outpatient.      Right lateral buttock wounds   Was seen by [facility] 5/31. Continue wound care - Leave open to air unless areas start draining       1. Adjustment to disability:  Monitor mood  2. FEN: reg  3. Bowel: monitor  4. Bladder: monitoro  5. DVT Prophylaxis: lovenox (will discontinue on discharge)   6. GI Prophylaxis: ppi + pepcid  7. Code: full  8. Disposition: goal for home   9. ELOS: therapy evaluations today.   10. Follow up Appointments on Discharge: pcp, neurosurgery.       Bailey Mack PA-C  Physical Medicine & Rehabilitation        discussed with     I spent a total of 40 minutes face-to-face or managing the care of Tere Turk. Over 50% of my time on the unit was spent counseling the patient and coordinating care. See note for details.

## 2022-06-09 NOTE — PROGRESS NOTES
Notified that pt D/Cing tomorrow, Fri 06/10. Moriahr called HC agencies listed on email from insurance . Moriahr was told that pt can not be set up with HC in [place] until she sees her PCP and until her PCP orders the HC (due to crossing state lines). [facility] who stated that they would take down pt information and would be able to accommodate once PCP orders are faxed. SWer updated the team. HUC assisting with setting up apts.     SW met with pt. SWer explained the process and provided pt with a print out of the email from the insurance . This email had the insurance CM name and number, HC name, number, and direct contact, and SWer attached business card if questions arise when pt is home. Pt expressed understanding and agreement with plan. Pt denied additional needs, questions or concerns.     Insurance  updated on new discharge date. [employer] [facility] --Bella Nazario RN  PH: [phone], [email or web address]. No additional SW needs.     [facility]   [facility]   Direct Phone: [phone]  I   Pager: [phone]  I  Fax: [phone]

## 2022-06-09 NOTE — PROGRESS NOTES
Discharge Planner Post-Acute Rehab OT:     Discharge Plan: home in [place] with family A, HCOT    Precautions: falls, impaired BLE sensation, spinal - no bending, twisting or lifting >8# until 6/10, TLSO OOB     Current Status:  ADLs:    Mobility: CGA FWW, w/c longer distance    Grooming: IND seated.    Dressing: UB - Set up seated including TLSO. LB - Mod A AE.    Bathing: sba extended tub bench     cga transfer to extended tub bench.     Toileting: CGA SPT FWW. Min A clothing management. Assist hygiene.  IADLs: IND PTA, including driving and childcare.   Vision/Cognition: No concerns, not a barrier to discharge.    Assessment:ot family trainnig with standard w/c no cushion to simulate basic w/c for airposrt transport , discussed and educated in tchnique for using bathroom and  options for getting into seat of plane, pt able to simulate side stepping into airplane seat  with 2, 2 seat couches one in front of the other and pt holding back of front couch to side set pt able to sit 16 inches and stand with using back of front couch, pt and mother able to verbalize understanding of techniques  Other Barriers to Discharge (DME, Family Training, etc):   Home set up: Lives in [place], 6 [place]. Needs to tolerate upright position for multiple hours and functional transfers required for flight home. Mother Kemi in [place] for family training and assist. Lives with spouse Fernando and son Isak (4 y.o) with special needs. Parents moved to [place] to assist as needed once home.  Family training - 6/9/22 10:30-12p completed  DME - Pt has adjustable bed, HHSH, combination shower chair/OTC, reacher, raised toilet seat option. Plans to purchase leg  and bidet. Needs transport chair and fww.

## 2022-06-09 NOTE — PLAN OF CARE
FOCUS/GOAL  Bowel management, Bladder management, Pain management, Mobility, Skin integrity, and Safety management    ASSESSMENT, INTERVENTIONS AND CONTINUING PLAN FOR GOAL:  Patient is alert and oriented x 4 uses call light appropriately and able to make needs known. A-1 with walker. Regular/thin take pills whole. Complained of blower back and bilateral leg pain PRN and scheduled pain medication was effective. Wears back brace when OOB. Continent of B/B last BM 06/08 this shift. VS WDL no care concern at this time. BIPAP at night. Call light is in reach alarm is on for safety. We will continue per POC.   Goal Outcome Evaluation:

## 2022-06-09 NOTE — PLAN OF CARE
FOCUS/GOAL  Medical management    ASSESSMENT, INTERVENTIONS AND CONTINUING PLAN FOR GOAL:  Patient alert and oriented, able to make needs known  Calls appropriately  Slept most of the night, awaken in between requesting pain medication  Constant pain to back and bilateral extremities managed by scheduled and PRN medications  Safety rounding checked completed, 3 side rails UP, bed alarm ON, call light in reach  No concern overnight, may continue with POC  Goal Outcome Evaluation:

## 2022-06-09 NOTE — DISCHARGE INSTRUCTIONS
PCP follow up hospitalization within one week.   Your records have been faxed to  Dr. Isabel.     Fax:  [phone]       Neurosurgery  Your records have been faxed to the clinic.  Follow up neurosurgery within 2 weeks- imaging as advised.     Fax:   [phone]     Discharge summary and orders to be faxed to PCP, and neurosurgery after release of information completed and signed.

## 2022-06-09 NOTE — PLAN OF CARE
Goal Outcome Evaluation:    Plan of Care Reviewed With: patient     Overall Patient Progress: improving    Outcome Evaluation: Pt participating in therapies. Advocating for pain medications. Back pain present, PRN oxy given and scheduled pain medications. A1 with walker. uses call light appropriately.    Pt A&Ox4. A1 with walker. Brace on OOB. Cont of B&B, LBM 6/9. Back incisions BRIEN. Pt c/o back pain, PRN oxy 15mg given x2 shift and scheduled medications provided relief. Pt  denies SOB, headache, or nausea. BLE tingling present since surgery. Call light in reach, alarms on, continue POC.

## 2022-06-09 NOTE — CARE PLAN
Occupational Therapy Discharge Summary    Reason for therapy discharge:    Discharged to home with home therapy.    Progress towards therapy goal(s). See goals on Care Plan in [facility] electronic health record for goal details.  Goals partially met.  Barriers to achieving goals:   discharge from facility.    Therapy recommendation(s):    Continued therapy is recommended.  Rationale/Recommendations:  to increase pt Winston with adls and Iadls home OT.

## 2022-06-09 NOTE — PLAN OF CARE
"Physical Therapy Discharge Summary    Reason for therapy discharge:    Patient/family request discontinuation of services.    Progress towards therapy goal(s). See goals on Care Plan in [facility] electronic health record for goal details.  Goals not met.  Barriers to achieving goals:   limited tolerance for therapy. and patient requests discharge to home    Therapy recommendation(s):    Continued therapy is recommended.  Rationale/Recommendations:  home PT for safety within home, strength for stairs, consistent household distnace ambulation and progress to PLOF.      Discharge Plan: home with assist, home PT    Precautions: falls, spinal, LSO when OOB    Current Status:  Bed Mobility: MOD I  Transfer: CGA stand/pivot FWW  Gait: CGA FWW ~40', limited by pain  Stairs: CGA 6\"x4 B rail (stairs at home 7', unable to simulate here)  Balance: good sitting balance, support in standing for pain    Assessment: Moving discharge up to Friday per pt perference, understands she will need assist and not be mod-I at this time. Provided transport chair resources, strongly encouraged pt to consider purchasing as soon as possible vs waiting to work with HH to setup a manual wc rental while in [place]- d/t insurance issues needing to establish visit with PCP to get referral for home PT.    Other Barriers to Discharge (DME, Family Training, etc):   FWW and gait belt issued 6/9  Family training - completed 6/9. Educated pt and family to have Ax2 for [facility] home  "

## 2022-06-10 ENCOUNTER — DOCUMENTATION ONLY (OUTPATIENT)
Dept: REHABILITATION | Facility: CLINIC | Age: 37
End: 2022-06-10
Payer: COMMERCIAL

## 2022-06-10 VITALS
DIASTOLIC BLOOD PRESSURE: 67 MMHG | SYSTOLIC BLOOD PRESSURE: 136 MMHG | BODY MASS INDEX: 48.82 KG/M2 | OXYGEN SATURATION: 93 % | TEMPERATURE: 98.7 F | RESPIRATION RATE: 20 BRPM | HEIGHT: 65 IN | HEART RATE: 83 BPM | WEIGHT: 293 LBS

## 2022-06-10 LAB
CREAT SERPL-MCNC: 0.61 MG/DL (ref 0.52–1.04)
GFR SERPL CREATININE-BSD FRML MDRD: >90 ML/MIN/1.73M2
PLATELET # BLD AUTO: 373 10E3/UL (ref 150–450)

## 2022-06-10 PROCEDURE — 85049 AUTOMATED PLATELET COUNT: CPT | Performed by: PHYSICIAN ASSISTANT

## 2022-06-10 PROCEDURE — 82565 ASSAY OF CREATININE: CPT | Performed by: PHYSICIAN ASSISTANT

## 2022-06-10 PROCEDURE — 250N000013 HC RX MED GY IP 250 OP 250 PS 637: Performed by: PHYSICIAN ASSISTANT

## 2022-06-10 PROCEDURE — 99239 HOSP IP/OBS DSCHRG MGMT >30: CPT | Performed by: PHYSICAL MEDICINE & REHABILITATION

## 2022-06-10 PROCEDURE — 250N000013 HC RX MED GY IP 250 OP 250 PS 637: Performed by: PHYSICAL MEDICINE & REHABILITATION

## 2022-06-10 PROCEDURE — 36415 COLL VENOUS BLD VENIPUNCTURE: CPT | Performed by: PHYSICIAN ASSISTANT

## 2022-06-10 PROCEDURE — 250N000011 HC RX IP 250 OP 636: Performed by: PHYSICIAN ASSISTANT

## 2022-06-10 RX ADMIN — METHOCARBAMOL 1000 MG: 500 TABLET ORAL at 06:10

## 2022-06-10 RX ADMIN — HYDROXYZINE HYDROCHLORIDE 25 MG: 25 TABLET, FILM COATED ORAL at 08:34

## 2022-06-10 RX ADMIN — LISINOPRIL 20 MG: 20 TABLET ORAL at 08:34

## 2022-06-10 RX ADMIN — ACETAMINOPHEN 1000 MG: 500 TABLET ORAL at 06:10

## 2022-06-10 RX ADMIN — Medication 1 CHEW TAB: at 08:34

## 2022-06-10 RX ADMIN — ACETAMINOPHEN 325MG 975 MG: 325 TABLET ORAL at 00:11

## 2022-06-10 RX ADMIN — OXYCODONE HYDROCHLORIDE 15 MG: 5 TABLET ORAL at 07:02

## 2022-06-10 RX ADMIN — GABAPENTIN 900 MG: 300 CAPSULE ORAL at 08:34

## 2022-06-10 RX ADMIN — ENOXAPARIN SODIUM 40 MG: 40 INJECTION SUBCUTANEOUS at 08:34

## 2022-06-10 RX ADMIN — OXYCODONE HYDROCHLORIDE 15 MG: 5 TABLET ORAL at 11:04

## 2022-06-10 RX ADMIN — PANTOPRAZOLE SODIUM 40 MG: 40 TABLET, DELAYED RELEASE ORAL at 08:34

## 2022-06-10 RX ADMIN — IBUPROFEN 600 MG: 200 TABLET, FILM COATED ORAL at 10:29

## 2022-06-10 ASSESSMENT — ACTIVITIES OF DAILY LIVING (ADL)
ADLS_ACUITY_SCORE: 38

## 2022-06-10 NOTE — PLAN OF CARE
FOCUS/GOAL  Pain management, Discharge planning, and Mobility    ASSESSMENT, INTERVENTIONS AND CONTINUING PLAN FOR GOAL:    Goal Outcome Evaluation:        Pt Aox4. Pleasant and cooperative. Pt discharged from facility with assist from mother, at approx 1115. All personal items and meds with pt. Pt denies questions or concerns for nurse at this time.

## 2022-06-10 NOTE — PLAN OF CARE
Goal Outcome Evaluation:    Plan of Care Reviewed With: patient     Overall Patient Progress: improving    Outcome Evaluation: Pt reports 8/10 back pain decreased to 6/10 with PRN oxycodone, as well as other scheduled muscle relaxers. Remains assist of 1 with a walker. No new skin issues noted, back incisions approximated with no drainage. Anticipated to discharge tomorrow.

## 2022-06-10 NOTE — PROGRESS NOTES
Prior Authorization **INITIATED**    Medication: Oxycodone 10mg tabs  Insurance Company: [employer] - Phone [phone] Fax [phone]  Pharmacy Filling the Rx: [place], [place] - [address]  Filling Pharmacy Phone: [phone]  Filling Pharmacy Fax: [phone]  Start Date: 6/9/2022  Reference #: CoverMyMeds Key: [id number] - [place] Case ID: [id number]  Comments:  Discharge pharmacy sent patient with supply while auth is pending. Will retroactively bill once determination received.      Franchesca Yu CPhT  [place] [facility] Liaison  Pronouns: She/Her/Hers    [facility]  [address]  [address], [place] [address]   [email or web address]  [email or web address]   Phone: [phone]  Pager: [phone]  Fax: [phone]

## 2022-06-10 NOTE — PLAN OF CARE
Goal Outcome Evaluation:    Plan of Care Reviewed With: patient     Overall Patient Progress: improving    Outcome Evaluation: Pt reported 8/10 BLE pain treated with PRN Tylenol.    A/Ox4. Pain 8/10 in BLE treated with Tylenol. CGA with walker and gait belt. Continent of B/B. LBM 6/8/22. Regular diet, takes pills whole. Must wear brace with OOB. Rash on right buttocks-BRIEN. Incision on spine BRIEN-CDI. No LDA's. Appears to be sleeping during rounds and in between cares. Call light within reach, able to make needs known. Continue POC. discharge 6/10/22.

## 2022-06-13 NOTE — PROGRESS NOTES
Prior Authorization **APPROVED**    Authorization Effective Date: 6/10/2022  Authorization Expiration Date: 12/7/2022  Medication: Oxycodone 10mg tabs *APPROVED*  Approved Dose/Quantity: n/a  Reference #: CoverMyMeds Key: [id number] - [place] Case ID: [id number]   Insurance Company: [employer] Phone [phone] Fax [phone]  Expected CoPay: $0.00     CoPay Card Available: No    Foundation Assistance Needed: n/a  Which Pharmacy is filling the prescription (Not needed for infusion/clinic administered): [facility] [place], [place] - [address]  Pharmacy Notified: Yes  Patient Notified: Yes  Comments:  Discharge pharmacy sent patient with supply while auth is pending. *Retroactively Billed*            Franchesca Yu CPhT  [place] [facility]charge Pharm[facility] Liaison  Pronouns: She/Her/Hers    [facility]  [address]  [address] [address]   [email or web address]  [email or web address]   Phone: [phone]  Pager: [phone]  Fax: [phone]   Provider Jennyfer TIAN/KIRIT   Date form sent to provider: 6/ 26/ 2019   Comments FMLA FORMS SENT TO PROVIDER FOR REVIEW AND SIGNATURE

## 2024-04-23 NOTE — PLAN OF CARE
Goal Outcome Evaluation:    Overall Patient Progress: no change    Outcome Evaluation: Still with lower back with minimal reduction of pain rating with scheduled pain medications and PRN Oxycodone. Started on Lidocaine patches but one came off during toileting (L thigh) and one on top of R foot removed per pt request as she siad it wasn't working. Assist of 1 with walker and wheelchair, doing well. continent of bladder and bowel. Still awake at shift change.       Pt received sitting up in bedside chair in NAD, +tele, +L A-line, +SpO2 monitor, +dried blood in b/l nostrils, KELLY Escobar cleared Pt for PT treatment MRS 4 Pt received sitting up in bedside chair in NAD, +tele, +L A-line, +SpO2 monitor, +dried blood in b/l nostrils, KELLY Escobar cleared Pt for PT treatment

## (undated) DEVICE — DRAPE IOBAN ISOLATION VERTICAL 6619

## (undated) DEVICE — BLADE KNIFE SURG 11 371111

## (undated) DEVICE — PAD PROAXIS TABLE KIT SPK10182

## (undated) DEVICE — DRSG GAUZE 4X4" TRAY

## (undated) DEVICE — DRAPE C-ARM 60X42" 1013

## (undated) DEVICE — SU MONOCRYL 4-0 PS-2 27" UND Y426H

## (undated) DEVICE — K-WIRES

## (undated) DEVICE — GLOVE PROTEXIS W/NEU-THERA 6.5  2D73TE65

## (undated) DEVICE — NDL SPINAL 18GA 3.5" 405184

## (undated) DEVICE — ESU GROUND PAD ADULT W/CORD E7507

## (undated) DEVICE — CATH TRAY FOLEY SURESTEP 16FR W/URNE MTR STLK LATEX A303316A

## (undated) DEVICE — GLOVE PROTEXIS POWDER FREE SMT 8.0  2D72PT80X

## (undated) DEVICE — DRSG STERI STRIP 1/2X4" R1547

## (undated) DEVICE — TOOL DISSECT MIDAS MR8 14CM MATCH HEAD 3MM MR8-14MH30

## (undated) DEVICE — GLOVE PROTEXIS POWDER FREE 7.5 ORTHOPEDIC 2D73ET75

## (undated) DEVICE — BAG CLEAR TRASH 1.3M 39X33" P4040C

## (undated) DEVICE — LINEN ORTHO ACL PACK 5447

## (undated) DEVICE — SYR 10ML LL W/O NDL 302995

## (undated) DEVICE — DRSG ABDOMINAL 07 1/2X8" 7197D

## (undated) DEVICE — SU VICRYL 2-0 CT-2 CR 8X18" J726D

## (undated) DEVICE — DRAPE MAYO STAND 23X54 8337

## (undated) DEVICE — Device

## (undated) DEVICE — SYR 03ML LL W/O NDL 309657

## (undated) DEVICE — IOM FLAT FEE

## (undated) DEVICE — CUSHION INSERT LG PRONE VIEW JACKSON TABLE

## (undated) DEVICE — FLEXIBLE CURETTE-BLADE

## (undated) DEVICE — GLOVE PROTEXIS BLUE W/NEU-THERA 6.5  2D73EB65

## (undated) DEVICE — PREP DURAPREP 26ML APL 8630

## (undated) DEVICE — SUCTION MANIFOLD NEPTUNE 2 SYS 4 PORT 0702-020-000

## (undated) DEVICE — PACK SMALL SPINE RIDGES

## (undated) DEVICE — SPONGE RAY-TEC 4X8" 7318

## (undated) RX ORDER — DEXAMETHASONE SODIUM PHOSPHATE 4 MG/ML
INJECTION, SOLUTION INTRA-ARTICULAR; INTRALESIONAL; INTRAMUSCULAR; INTRAVENOUS; SOFT TISSUE
Status: DISPENSED
Start: 2022-05-27

## (undated) RX ORDER — ONDANSETRON 2 MG/ML
INJECTION INTRAMUSCULAR; INTRAVENOUS
Status: DISPENSED
Start: 2022-05-27

## (undated) RX ORDER — GLYCOPYRROLATE 0.2 MG/ML
INJECTION INTRAMUSCULAR; INTRAVENOUS
Status: DISPENSED
Start: 2022-05-27

## (undated) RX ORDER — PROPOFOL 10 MG/ML
INJECTION, EMULSION INTRAVENOUS
Status: DISPENSED
Start: 2022-05-27

## (undated) RX ORDER — BUPIVACAINE HYDROCHLORIDE 7.5 MG/ML
INJECTION, SOLUTION EPIDURAL; RETROBULBAR
Status: DISPENSED
Start: 2022-05-27

## (undated) RX ORDER — TRIAMCINOLONE ACETONIDE 40 MG/ML
INJECTION, SUSPENSION INTRA-ARTICULAR; INTRAMUSCULAR
Status: DISPENSED
Start: 2022-05-27

## (undated) RX ORDER — FENTANYL CITRATE-0.9 % NACL/PF 10 MCG/ML
PLASTIC BAG, INJECTION (ML) INTRAVENOUS
Status: DISPENSED
Start: 2022-05-27

## (undated) RX ORDER — LIDOCAINE HYDROCHLORIDE AND EPINEPHRINE 10; 10 MG/ML; UG/ML
INJECTION, SOLUTION INFILTRATION; PERINEURAL
Status: DISPENSED
Start: 2022-05-27

## (undated) RX ORDER — BUPIVACAINE HYDROCHLORIDE 2.5 MG/ML
INJECTION, SOLUTION EPIDURAL; INFILTRATION; INTRACAUDAL
Status: DISPENSED
Start: 2022-05-27

## (undated) RX ORDER — LIDOCAINE HYDROCHLORIDE 10 MG/ML
INJECTION, SOLUTION EPIDURAL; INFILTRATION; INTRACAUDAL; PERINEURAL
Status: DISPENSED
Start: 2022-05-27

## (undated) RX ORDER — FENTANYL CITRATE 50 UG/ML
INJECTION, SOLUTION INTRAMUSCULAR; INTRAVENOUS
Status: DISPENSED
Start: 2022-05-27

## (undated) RX ORDER — OXYCODONE HYDROCHLORIDE 5 MG/1
TABLET ORAL
Status: DISPENSED
Start: 2022-05-27

## (undated) RX ORDER — HYDROMORPHONE HCL IN WATER/PF 6 MG/30 ML
PATIENT CONTROLLED ANALGESIA SYRINGE INTRAVENOUS
Status: DISPENSED
Start: 2022-05-27

## (undated) RX ORDER — HYDROXYZINE HYDROCHLORIDE 25 MG/1
TABLET, FILM COATED ORAL
Status: DISPENSED
Start: 2022-05-27

## (undated) RX ORDER — GABAPENTIN 100 MG/1
CAPSULE ORAL
Status: DISPENSED
Start: 2022-05-27